# Patient Record
Sex: FEMALE | Race: WHITE | Employment: FULL TIME | URBAN - METROPOLITAN AREA
[De-identification: names, ages, dates, MRNs, and addresses within clinical notes are randomized per-mention and may not be internally consistent; named-entity substitution may affect disease eponyms.]

---

## 2017-05-12 ENCOUNTER — EMPLOYEE WELLNESS (OUTPATIENT)
Dept: OTHER | Age: 62
End: 2017-05-12

## 2017-05-12 LAB
CHOLESTEROL, TOTAL: 238 MG/DL (ref 0–199)
GLUCOSE BLD-MCNC: 92 MG/DL (ref 74–107)
HDLC SERPL-MCNC: 59 MG/DL
LDL CHOLESTEROL CALCULATED: 157 MG/DL (ref 0–99)
TRIGL SERPL-MCNC: 110 MG/DL (ref 0–149)

## 2018-02-04 ENCOUNTER — NURSE TRIAGE (OUTPATIENT)
Dept: OTHER | Facility: CLINIC | Age: 63
End: 2018-02-04

## 2018-03-20 VITALS — WEIGHT: 261 LBS | BODY MASS INDEX: 42.13 KG/M2

## 2018-06-15 ENCOUNTER — HOSPITAL ENCOUNTER (OUTPATIENT)
Dept: ULTRASOUND IMAGING | Age: 63
Discharge: HOME | End: 2018-06-15
Payer: COMMERCIAL

## 2018-06-15 ENCOUNTER — HOSPITAL ENCOUNTER (OUTPATIENT)
Dept: MAMMOGRAPHY | Age: 63
Discharge: HOME | End: 2018-06-17
Payer: COMMERCIAL

## 2018-06-15 DIAGNOSIS — N63.0 LUMP OR MASS IN BREAST: ICD-10-CM

## 2018-06-15 DIAGNOSIS — N63.0 BREAST MASS: ICD-10-CM

## 2018-06-15 PROCEDURE — 76642 ULTRASOUND BREAST LIMITED: CPT

## 2018-06-15 PROCEDURE — 77065 DX MAMMO INCL CAD UNI: CPT

## 2018-06-25 ENCOUNTER — HOSPITAL ENCOUNTER (OUTPATIENT)
Dept: ULTRASOUND IMAGING | Age: 63
Discharge: HOME | End: 2018-06-25
Payer: COMMERCIAL

## 2018-06-25 ENCOUNTER — HOSPITAL ENCOUNTER (OUTPATIENT)
Dept: MAMMOGRAPHY | Age: 63
End: 2018-06-25
Payer: COMMERCIAL

## 2018-06-25 DIAGNOSIS — N60.09 CYST OF BREAST, UNSPECIFIED LATERALITY: ICD-10-CM

## 2018-06-25 PROCEDURE — 88173 CYTOPATH EVAL FNA REPORT: CPT

## 2018-06-25 PROCEDURE — 88305 TISSUE EXAM BY PATHOLOGIST: CPT

## 2018-06-25 PROCEDURE — 76942 ECHO GUIDE FOR BIOPSY: CPT

## 2018-06-25 PROCEDURE — 19000 PUNCTURE ASPIR CYST BREAST: CPT

## 2018-07-12 ENCOUNTER — INITIAL CONSULT (OUTPATIENT)
Dept: SURGERY | Age: 63
End: 2018-07-12
Payer: COMMERCIAL

## 2018-07-12 VITALS
DIASTOLIC BLOOD PRESSURE: 84 MMHG | SYSTOLIC BLOOD PRESSURE: 128 MMHG | HEIGHT: 66 IN | HEART RATE: 84 BPM | BODY MASS INDEX: 45.8 KG/M2 | OXYGEN SATURATION: 96 % | WEIGHT: 285 LBS

## 2018-07-12 DIAGNOSIS — N63.0 BREAST MASS: Primary | ICD-10-CM

## 2018-07-12 PROCEDURE — 99204 OFFICE O/P NEW MOD 45 MIN: CPT | Performed by: SURGERY

## 2018-08-23 ENCOUNTER — HOSPITAL ENCOUNTER (OUTPATIENT)
Age: 63
Discharge: HOME | End: 2018-08-23
Payer: COMMERCIAL

## 2018-08-23 LAB
ALBUMIN SERPL-MCNC: 4.1 G/DL (ref 3.5–5.2)
ALP BLD-CCNC: 42 U/L (ref 35–104)
ALT SERPL-CCNC: 25 U/L (ref 0–32)
ANION GAP SERPL CALCULATED.3IONS-SCNC: 11 MMOL/L (ref 7–16)
AST SERPL-CCNC: 23 U/L (ref 0–31)
BILIRUB SERPL-MCNC: 0.6 MG/DL (ref 0–1.2)
BUN BLDV-MCNC: 19 MG/DL (ref 8–23)
CALCIUM SERPL-MCNC: 10.1 MG/DL (ref 8.6–10.2)
CHLORIDE BLD-SCNC: 102 MMOL/L (ref 98–107)
CHOLESTEROL, FASTING: 209 MG/DL (ref 0–199)
CO2: 28 MMOL/L (ref 22–29)
CREAT SERPL-MCNC: 1 MG/DL (ref 0.5–1)
GFR AFRICAN AMERICAN: >60
GFR NON-AFRICAN AMERICAN: 56 ML/MIN/1.73
GLUCOSE FASTING: 126 MG/DL (ref 74–109)
HCT VFR BLD CALC: 42.6 % (ref 34–48)
HDLC SERPL-MCNC: 45 MG/DL
HEMOGLOBIN: 14.5 G/DL (ref 11.5–15.5)
LDL CHOLESTEROL CALCULATED: 134 MG/DL (ref 0–99)
MAGNESIUM: 1.9 MG/DL (ref 1.6–2.6)
MCH RBC QN AUTO: 28.7 PG (ref 26–35)
MCHC RBC AUTO-ENTMCNC: 34 % (ref 32–34.5)
MCV RBC AUTO: 84.4 FL (ref 80–99.9)
PDW BLD-RTO: 12 FL (ref 11.5–15)
PLATELET # BLD: 198 E9/L (ref 130–450)
PMV BLD AUTO: 9.4 FL (ref 7–12)
POTASSIUM SERPL-SCNC: 4.4 MMOL/L (ref 3.5–5)
RBC # BLD: 5.05 E12/L (ref 3.5–5.5)
RHEUMATOID FACTOR: <10 IU/ML (ref 0–13)
SEDIMENTATION RATE, ERYTHROCYTE: 5 MM/HR (ref 0–20)
SODIUM BLD-SCNC: 141 MMOL/L (ref 132–146)
TOTAL PROTEIN: 6.6 G/DL (ref 6.4–8.3)
TRIGLYCERIDE, FASTING: 151 MG/DL (ref 0–149)
TSH SERPL DL<=0.05 MIU/L-ACNC: 1.35 UIU/ML (ref 0.27–4.2)
URIC ACID, SERUM: 7.5 MG/DL (ref 2.4–5.7)
VLDLC SERPL CALC-MCNC: 30 MG/DL
WBC # BLD: 4.5 E9/L (ref 4.5–11.5)

## 2018-08-23 PROCEDURE — 80061 LIPID PANEL: CPT

## 2018-08-23 PROCEDURE — 84443 ASSAY THYROID STIM HORMONE: CPT

## 2018-08-23 PROCEDURE — 80053 COMPREHEN METABOLIC PANEL: CPT

## 2018-08-23 PROCEDURE — 86431 RHEUMATOID FACTOR QUANT: CPT

## 2018-08-23 PROCEDURE — 85651 RBC SED RATE NONAUTOMATED: CPT

## 2018-08-23 PROCEDURE — 36415 COLL VENOUS BLD VENIPUNCTURE: CPT

## 2018-08-23 PROCEDURE — 85027 COMPLETE CBC AUTOMATED: CPT

## 2018-08-23 PROCEDURE — 84550 ASSAY OF BLOOD/URIC ACID: CPT

## 2018-08-23 PROCEDURE — 83735 ASSAY OF MAGNESIUM: CPT

## 2018-11-06 ENCOUNTER — TELEPHONE (OUTPATIENT)
Dept: SURGERY | Age: 63
End: 2018-11-06

## 2018-11-06 DIAGNOSIS — N63.0 BREAST MASS: Primary | ICD-10-CM

## 2018-11-06 NOTE — TELEPHONE ENCOUNTER
----- Message from Aydin Christianson sent at 2018  1:01 PM EDT -----  Regardin.[email or web address] US breast  Pt needs scheduled for repeat US left breast

## 2018-11-14 ENCOUNTER — HOSPITAL ENCOUNTER (OUTPATIENT)
Dept: ULTRASOUND IMAGING | Age: 63
Discharge: HOME | End: 2018-11-14
Payer: COMMERCIAL

## 2018-11-14 DIAGNOSIS — N63.0 BREAST MASS: ICD-10-CM

## 2018-11-14 PROCEDURE — 76642 ULTRASOUND BREAST LIMITED: CPT

## 2019-01-11 ENCOUNTER — HOSPITAL ENCOUNTER (OUTPATIENT)
Dept: MAMMOGRAPHY | Age: 64
Discharge: HOME | End: 2019-01-13
Payer: COMMERCIAL

## 2019-01-11 ENCOUNTER — HOSPITAL ENCOUNTER (OUTPATIENT)
Age: 64
Discharge: HOME | End: 2019-01-11
Payer: COMMERCIAL

## 2019-01-11 DIAGNOSIS — Z12.39 BREAST CANCER SCREENING: ICD-10-CM

## 2019-01-11 LAB
ALBUMIN SERPL-MCNC: 4.1 G/DL (ref 3.5–5.2)
ALP BLD-CCNC: 63 U/L (ref 35–104)
ALT SERPL-CCNC: 25 U/L (ref 0–32)
ANION GAP SERPL CALCULATED.3IONS-SCNC: 10 MMOL/L (ref 7–16)
AST SERPL-CCNC: 21 U/L (ref 0–31)
BILIRUB SERPL-MCNC: 0.3 MG/DL (ref 0–1.2)
BUN BLDV-MCNC: 18 MG/DL (ref 8–23)
CALCIUM SERPL-MCNC: 9.6 MG/DL (ref 8.6–10.2)
CHLORIDE BLD-SCNC: 101 MMOL/L (ref 98–107)
CHOLESTEROL, FASTING: 142 MG/DL (ref 0–199)
CO2: 28 MMOL/L (ref 22–29)
CREAT SERPL-MCNC: 0.8 MG/DL (ref 0.5–1)
GFR AFRICAN AMERICAN: >60
GFR NON-AFRICAN AMERICAN: >60 ML/MIN/1.73
GLUCOSE FASTING: 124 MG/DL (ref 74–99)
HDLC SERPL-MCNC: 53 MG/DL
LDL CHOLESTEROL CALCULATED: 72 MG/DL (ref 0–99)
POTASSIUM SERPL-SCNC: 4.6 MMOL/L (ref 3.5–5)
SODIUM BLD-SCNC: 139 MMOL/L (ref 132–146)
TOTAL PROTEIN: 6.9 G/DL (ref 6.4–8.3)
TRIGLYCERIDE, FASTING: 83 MG/DL (ref 0–149)
URIC ACID, SERUM: 2.8 MG/DL (ref 2.4–5.7)
VLDLC SERPL CALC-MCNC: 17 MG/DL

## 2019-01-11 PROCEDURE — 36415 COLL VENOUS BLD VENIPUNCTURE: CPT

## 2019-01-11 PROCEDURE — 77063 BREAST TOMOSYNTHESIS BI: CPT

## 2019-01-11 PROCEDURE — 80053 COMPREHEN METABOLIC PANEL: CPT

## 2019-01-11 PROCEDURE — 80061 LIPID PANEL: CPT

## 2019-01-11 PROCEDURE — 84550 ASSAY OF BLOOD/URIC ACID: CPT

## 2019-03-12 ENCOUNTER — HOSPITAL ENCOUNTER (OUTPATIENT)
Dept: MAMMOGRAPHY | Age: 64
Discharge: HOME | End: 2019-03-14
Payer: COMMERCIAL

## 2019-03-12 DIAGNOSIS — Z13.820 SCREENING FOR OSTEOPOROSIS: ICD-10-CM

## 2019-03-12 PROCEDURE — 77080 DXA BONE DENSITY AXIAL: CPT

## 2019-06-28 ENCOUNTER — HOSPITAL ENCOUNTER (OUTPATIENT)
Age: 64
Discharge: HOME | End: 2019-06-28
Payer: COMMERCIAL

## 2019-06-28 LAB
ALBUMIN SERPL-MCNC: 4.1 G/DL (ref 3.5–5.2)
ALP BLD-CCNC: 64 U/L (ref 35–104)
ALT SERPL-CCNC: 22 U/L (ref 0–32)
ANION GAP SERPL CALCULATED.3IONS-SCNC: 11 MMOL/L (ref 7–16)
AST SERPL-CCNC: 19 U/L (ref 0–31)
BASOPHILS ABSOLUTE: 0.05 E9/L (ref 0–0.2)
BASOPHILS RELATIVE PERCENT: 0.9 % (ref 0–2)
BILIRUB SERPL-MCNC: 0.6 MG/DL (ref 0–1.2)
BUN BLDV-MCNC: 21 MG/DL (ref 8–23)
CALCIUM SERPL-MCNC: 10 MG/DL (ref 8.6–10.2)
CHLORIDE BLD-SCNC: 101 MMOL/L (ref 98–107)
CO2: 28 MMOL/L (ref 22–29)
CREAT SERPL-MCNC: 0.9 MG/DL (ref 0.5–1)
EOSINOPHILS ABSOLUTE: 0.17 E9/L (ref 0.05–0.5)
EOSINOPHILS RELATIVE PERCENT: 3.2 % (ref 0–6)
GFR AFRICAN AMERICAN: >60
GFR NON-AFRICAN AMERICAN: >60 ML/MIN/1.73
GLUCOSE FASTING: 135 MG/DL (ref 74–99)
HCT VFR BLD CALC: 42 % (ref 34–48)
HEMOGLOBIN: 13.8 G/DL (ref 11.5–15.5)
IMMATURE GRANULOCYTES #: 0.02 E9/L
IMMATURE GRANULOCYTES %: 0.4 % (ref 0–5)
LYMPHOCYTES ABSOLUTE: 1.14 E9/L (ref 1.5–4)
LYMPHOCYTES RELATIVE PERCENT: 21.5 % (ref 20–42)
MCH RBC QN AUTO: 28.2 PG (ref 26–35)
MCHC RBC AUTO-ENTMCNC: 32.9 % (ref 32–34.5)
MCV RBC AUTO: 85.9 FL (ref 80–99.9)
MONOCYTES ABSOLUTE: 0.51 E9/L (ref 0.1–0.95)
MONOCYTES RELATIVE PERCENT: 9.6 % (ref 2–12)
NEUTROPHILS ABSOLUTE: 3.42 E9/L (ref 1.8–7.3)
NEUTROPHILS RELATIVE PERCENT: 64.4 % (ref 43–80)
PDW BLD-RTO: 12.1 FL (ref 11.5–15)
PLATELET # BLD: 206 E9/L (ref 130–450)
PMV BLD AUTO: 9.5 FL (ref 7–12)
POTASSIUM SERPL-SCNC: 4.4 MMOL/L (ref 3.5–5)
RBC # BLD: 4.89 E12/L (ref 3.5–5.5)
SODIUM BLD-SCNC: 140 MMOL/L (ref 132–146)
TOTAL PROTEIN: 6.8 G/DL (ref 6.4–8.3)
TSH SERPL DL<=0.05 MIU/L-ACNC: 0.91 UIU/ML (ref 0.27–4.2)
URIC ACID, SERUM: 3.1 MG/DL (ref 2.4–5.7)
WBC # BLD: 5.3 E9/L (ref 4.5–11.5)

## 2019-06-28 PROCEDURE — 85025 COMPLETE CBC W/AUTO DIFF WBC: CPT

## 2019-06-28 PROCEDURE — 80053 COMPREHEN METABOLIC PANEL: CPT

## 2019-06-28 PROCEDURE — 84550 ASSAY OF BLOOD/URIC ACID: CPT

## 2019-06-28 PROCEDURE — 36415 COLL VENOUS BLD VENIPUNCTURE: CPT

## 2019-06-28 PROCEDURE — 84443 ASSAY THYROID STIM HORMONE: CPT

## 2019-11-18 ENCOUNTER — HOSPITAL ENCOUNTER (OUTPATIENT)
Age: 64
Discharge: HOME | End: 2019-11-18
Payer: COMMERCIAL

## 2019-11-18 LAB
CHOLESTEROL, FASTING: 151 MG/DL (ref 0–199)
HDLC SERPL-MCNC: 51 MG/DL
LDL CHOLESTEROL CALCULATED: 70 MG/DL (ref 0–99)
TRIGLYCERIDE, FASTING: 149 MG/DL (ref 0–149)
VLDLC SERPL CALC-MCNC: 30 MG/DL

## 2019-11-18 PROCEDURE — 83721 ASSAY OF BLOOD LIPOPROTEIN: CPT

## 2019-11-18 PROCEDURE — 36415 COLL VENOUS BLD VENIPUNCTURE: CPT

## 2019-11-18 PROCEDURE — 80061 LIPID PANEL: CPT

## 2019-11-20 LAB — MISCELLANEOUS LAB TEST RESULT: NORMAL

## 2020-03-03 ENCOUNTER — HOSPITAL ENCOUNTER (INPATIENT)
Age: 65
LOS: 2 days | Discharge: HOME | DRG: 313 | End: 2020-03-05
Attending: INTERNAL MEDICINE | Admitting: INTERNAL MEDICINE
Payer: COMMERCIAL

## 2020-03-03 ENCOUNTER — APPOINTMENT (OUTPATIENT)
Dept: GENERAL RADIOLOGY | Age: 65
DRG: 313 | End: 2020-03-03
Attending: INTERNAL MEDICINE
Payer: COMMERCIAL

## 2020-03-03 PROBLEM — R06.09 DOE (DYSPNEA ON EXERTION): Status: ACTIVE | Noted: 2020-03-03

## 2020-03-03 LAB
ANION GAP SERPL CALCULATED.3IONS-SCNC: 14 MMOL/L (ref 7–16)
BUN BLDV-MCNC: 18 MG/DL (ref 8–23)
CALCIUM SERPL-MCNC: 9.8 MG/DL (ref 8.6–10.2)
CHLORIDE BLD-SCNC: 101 MMOL/L (ref 98–107)
CO2: 24 MMOL/L (ref 22–29)
CREAT SERPL-MCNC: 0.9 MG/DL (ref 0.5–1)
D DIMER: 538 NG/ML DDU
GFR AFRICAN AMERICAN: >60
GFR NON-AFRICAN AMERICAN: >60 ML/MIN/1.73
GLUCOSE BLD-MCNC: 141 MG/DL (ref 74–99)
HCT VFR BLD CALC: 43 % (ref 34–48)
HEMOGLOBIN: 14.4 G/DL (ref 11.5–15.5)
MCH RBC QN AUTO: 28.7 PG (ref 26–35)
MCHC RBC AUTO-ENTMCNC: 33.5 % (ref 32–34.5)
MCV RBC AUTO: 85.7 FL (ref 80–99.9)
PDW BLD-RTO: 12.2 FL (ref 11.5–15)
PLATELET # BLD: 210 E9/L (ref 130–450)
PMV BLD AUTO: 9.7 FL (ref 7–12)
POTASSIUM SERPL-SCNC: 3.8 MMOL/L (ref 3.5–5)
RBC # BLD: 5.02 E12/L (ref 3.5–5.5)
SODIUM BLD-SCNC: 139 MMOL/L (ref 132–146)
TROPONIN: <0.01 NG/ML (ref 0–0.03)
WBC # BLD: 6.1 E9/L (ref 4.5–11.5)

## 2020-03-03 PROCEDURE — 84484 ASSAY OF TROPONIN QUANT: CPT

## 2020-03-03 PROCEDURE — 1200000000 HC SEMI PRIVATE

## 2020-03-03 PROCEDURE — 80048 BASIC METABOLIC PNL TOTAL CA: CPT

## 2020-03-03 PROCEDURE — 36415 COLL VENOUS BLD VENIPUNCTURE: CPT

## 2020-03-03 PROCEDURE — G0379 DIRECT REFER HOSPITAL OBSERV: HCPCS

## 2020-03-03 PROCEDURE — 6370000000 HC RX 637 (ALT 250 FOR IP): Performed by: INTERNAL MEDICINE

## 2020-03-03 PROCEDURE — 6360000002 HC RX W HCPCS: Performed by: INTERNAL MEDICINE

## 2020-03-03 PROCEDURE — 96372 THER/PROPH/DIAG INJ SC/IM: CPT

## 2020-03-03 PROCEDURE — G0378 HOSPITAL OBSERVATION PER HR: HCPCS

## 2020-03-03 PROCEDURE — 71046 X-RAY EXAM CHEST 2 VIEWS: CPT

## 2020-03-03 PROCEDURE — 85378 FIBRIN DEGRADE SEMIQUANT: CPT

## 2020-03-03 PROCEDURE — 85027 COMPLETE CBC AUTOMATED: CPT

## 2020-03-03 RX ORDER — ONDANSETRON 2 MG/ML
4 INJECTION INTRAMUSCULAR; INTRAVENOUS EVERY 6 HOURS PRN
Status: DISCONTINUED | OUTPATIENT
Start: 2020-03-03 | End: 2020-03-05 | Stop reason: HOSPADM

## 2020-03-03 RX ORDER — M-VIT,TX,IRON,MINS/CALC/FOLIC 27MG-0.4MG
1 TABLET ORAL DAILY
Status: DISCONTINUED | OUTPATIENT
Start: 2020-03-03 | End: 2020-03-05 | Stop reason: HOSPADM

## 2020-03-03 RX ORDER — NAPROXEN 250 MG/1
250 TABLET ORAL 2 TIMES DAILY PRN
Status: DISCONTINUED | OUTPATIENT
Start: 2020-03-03 | End: 2020-03-05 | Stop reason: HOSPADM

## 2020-03-03 RX ORDER — LISINOPRIL 20 MG/1
20 TABLET ORAL DAILY
Status: ON HOLD | COMMUNITY
End: 2020-03-05 | Stop reason: SDUPTHER

## 2020-03-03 RX ORDER — NAPROXEN SODIUM 220 MG
220 TABLET ORAL 2 TIMES DAILY PRN
COMMUNITY

## 2020-03-03 RX ORDER — OMEPRAZOLE 20 MG/1
20 CAPSULE, DELAYED RELEASE ORAL DAILY
COMMUNITY
End: 2022-04-08

## 2020-03-03 RX ORDER — PANTOPRAZOLE SODIUM 40 MG/1
40 TABLET, DELAYED RELEASE ORAL
Status: DISCONTINUED | OUTPATIENT
Start: 2020-03-04 | End: 2020-03-05 | Stop reason: HOSPADM

## 2020-03-03 RX ORDER — IPRATROPIUM BROMIDE AND ALBUTEROL SULFATE 2.5; .5 MG/3ML; MG/3ML
1 SOLUTION RESPIRATORY (INHALATION) EVERY 4 HOURS PRN
Status: DISCONTINUED | OUTPATIENT
Start: 2020-03-03 | End: 2020-03-05 | Stop reason: HOSPADM

## 2020-03-03 RX ORDER — ATORVASTATIN CALCIUM 20 MG/1
20 TABLET, FILM COATED ORAL NIGHTLY
Status: DISCONTINUED | OUTPATIENT
Start: 2020-03-03 | End: 2020-03-05 | Stop reason: HOSPADM

## 2020-03-03 RX ORDER — VITAMIN B COMPLEX
4000 TABLET ORAL DAILY
Status: DISCONTINUED | OUTPATIENT
Start: 2020-03-03 | End: 2020-03-05 | Stop reason: HOSPADM

## 2020-03-03 RX ORDER — ACETAMINOPHEN 500 MG
500 TABLET ORAL EVERY 6 HOURS PRN
Status: DISCONTINUED | OUTPATIENT
Start: 2020-03-03 | End: 2020-03-05 | Stop reason: HOSPADM

## 2020-03-03 RX ORDER — LISINOPRIL 20 MG/1
20 TABLET ORAL DAILY
Status: DISCONTINUED | OUTPATIENT
Start: 2020-03-03 | End: 2020-03-04

## 2020-03-03 RX ORDER — NITROGLYCERIN 0.4 MG/1
0.4 TABLET SUBLINGUAL EVERY 5 MIN PRN
Status: DISCONTINUED | OUTPATIENT
Start: 2020-03-03 | End: 2020-03-05 | Stop reason: HOSPADM

## 2020-03-03 RX ORDER — ATORVASTATIN CALCIUM 20 MG/1
20 TABLET, FILM COATED ORAL NIGHTLY
COMMUNITY

## 2020-03-03 RX ORDER — FEBUXOSTAT 40 MG/1
40 TABLET, FILM COATED ORAL DAILY
Status: ON HOLD | COMMUNITY
End: 2020-03-05 | Stop reason: HOSPADM

## 2020-03-03 RX ORDER — NAPROXEN SODIUM 220 MG
220 TABLET ORAL 2 TIMES DAILY PRN
Status: DISCONTINUED | OUTPATIENT
Start: 2020-03-03 | End: 2020-03-03 | Stop reason: CLARIF

## 2020-03-03 RX ORDER — FEBUXOSTAT 40 MG/1
40 TABLET, FILM COATED ORAL DAILY
Status: DISCONTINUED | OUTPATIENT
Start: 2020-03-03 | End: 2020-03-03

## 2020-03-03 RX ADMIN — ENOXAPARIN SODIUM 40 MG: 40 INJECTION SUBCUTANEOUS at 17:51

## 2020-03-03 RX ADMIN — VITAMIN D, TAB 1000IU (100/BT) 4000 UNITS: 25 TAB at 17:50

## 2020-03-03 RX ADMIN — ATORVASTATIN CALCIUM 20 MG: 20 TABLET, FILM COATED ORAL at 20:31

## 2020-03-03 RX ADMIN — LISINOPRIL 20 MG: 20 TABLET ORAL at 17:50

## 2020-03-03 RX ADMIN — MULTIPLE VITAMINS W/ MINERALS TAB 1 TABLET: TAB at 17:50

## 2020-03-03 RX ADMIN — ASPIRIN 325 MG: 325 TABLET, DELAYED RELEASE ORAL at 20:31

## 2020-03-03 ASSESSMENT — PAIN SCALES - GENERAL
PAINLEVEL_OUTOF10: 0
PAINLEVEL_OUTOF10: 0

## 2020-03-03 NOTE — H&P
Department of Internal Medicine        CHIEF COMPLAINT: Shortness of breath, chest heaviness    Reason for Admission: Same    HISTORY OF PRESENT ILLNESS:      The patient is a 60 y.o. female who presents with shortness of breath with activity and chest heaviness with activity over the last 2 weeks. This was a continuous problem for the past 2 weeks with the patient stating that is similar to when she had a PE years ago. The PE is associated with surgery. Patient also seems to think it may be from her medication Uloric. Patient did end up stopping about a week ago with her symptoms mildly improved but they are still present. She denies any fever and chills, palpitations, dizziness or syncope. Patient has not had a stress test for many years. She denies any head congestion or cough. There is no significant family history of coronary disease or MI.     Past Medical History:    Past Medical History:   Diagnosis Date    Anticoagulant long-term use 03/2016    Xarelto    Hemorrhoids     Hiatal hernia     Hx of blood clots 03/2016    pulmonary embolism    Hyperlipidemia     Hypertension     Obesity     morbid    Osteoarthritis     Postoperative surgical complication involving digestive system     Wears dentures     permanent upper bridge front right tooth, next 2 on right     Past Surgical History:    Past Surgical History:   Procedure Laterality Date    APPENDECTOMY      CARPAL TUNNEL RELEASE      left    COLONOSCOPY  10/26/2016    Dr. Faiza Aranda, Findings: Normal Colon to the cecum - repeat in 10 years    CYST REMOVAL      EYE SURGERY Left 2016    retina tear    GASTRIC BAND  2/27/2007    Lap; 10 cm band; with hiatal hernia repair    JOINT REPLACEMENT Left 1/29/2016    total hip    JOINT REPLACEMENT Bilateral     knees    OTHER SURGICAL HISTORY  12/05/2016    laparscopic gastric band removal and hiatal hernia repair    OVARY REMOVAL Right     right due to cyst    TOTAL KNEE ARTHROPLASTY Bilateral General:  This is a 60 y.o. yo female who is alert and oriented in NAD  HEENT:  Head is normocephalic and atraumatic, PERRLA, EOMI, mucus membranes moist with no pharyngeal erythema or exudate. Neck:  Supple with no carotid bruits, JVD or thyromegaly. No cervical adenopathy  CV:  Regular rate and rhythm, no murmurs  Lungs:  Clear to auscultation bilaterally with no wheezes, rales or rhonchi  Abdomen:  + Morbidly obese.   Soft, nontender, nondistended, bowel sounds present  Extremities:  No edema, peripheral pulses intact bilaterally  Neuro:  Cranial nerves II-XII grossly intact; motor and sensory function intact with no focal deficits  Skin:  No rashes, lesions or wounds    DATA:  CBC with Differential:    Lab Results   Component Value Date    WBC 5.3 06/28/2019    RBC 4.89 06/28/2019    HGB 13.8 06/28/2019    HCT 42.0 06/28/2019     06/28/2019    MCV 85.9 06/28/2019    MCH 28.2 06/28/2019    MCHC 32.9 06/28/2019    RDW 12.1 06/28/2019    LYMPHOPCT 21.5 06/28/2019    MONOPCT 9.6 06/28/2019    BASOPCT 0.9 06/28/2019    MONOSABS 0.51 06/28/2019    LYMPHSABS 1.14 06/28/2019    EOSABS 0.17 06/28/2019    BASOSABS 0.05 06/28/2019     CMP:    Lab Results   Component Value Date     06/28/2019    K 4.4 06/28/2019     06/28/2019    CO2 28 06/28/2019    BUN 21 06/28/2019    CREATININE 0.9 06/28/2019    GFRAA >60 06/28/2019    LABGLOM >60 06/28/2019    GLUCOSE 92 05/12/2017    PROT 6.8 06/28/2019    LABALBU 4.1 06/28/2019    CALCIUM 10.0 06/28/2019    BILITOT 0.6 06/28/2019    ALKPHOS 64 06/28/2019    AST 19 06/28/2019    ALT 22 06/28/2019     Magnesium:    Lab Results   Component Value Date    MG 1.9 08/23/2018     Phosphorus:  No results found for: PHOS  PT/INR:    Lab Results   Component Value Date    PROTIME 12.7 03/09/2016    INR 1.2 03/09/2016     Troponin:    Lab Results   Component Value Date    TROPONINI <0.01 03/09/2016     U/A:  No results found for: NITRITE, COLORU, PROTEINU, PHUR, LABCAST,

## 2020-03-03 NOTE — LETTER
[facility] MED SURG TELE  [address]   Phone: [phone]    No name on file. March 5, 2020     Patient: Peyton Grier   YOB: 1955   Date of Visit: 3/3/2020       To Whom It May Concern:    [facility] was under our care from 3/3/2020 to 3/5/2020    If you have any questions or concerns, please don't hesitate to call.     Sincerely,    KIRTI BrittN,RN-BC

## 2020-03-04 ENCOUNTER — APPOINTMENT (OUTPATIENT)
Dept: CT IMAGING | Age: 65
DRG: 313 | End: 2020-03-04
Attending: INTERNAL MEDICINE
Payer: COMMERCIAL

## 2020-03-04 ENCOUNTER — APPOINTMENT (OUTPATIENT)
Dept: NUCLEAR MEDICINE | Age: 65
DRG: 313 | End: 2020-03-04
Attending: INTERNAL MEDICINE
Payer: COMMERCIAL

## 2020-03-04 LAB
ADENOVIRUS BY PCR: NOT DETECTED
ALBUMIN SERPL-MCNC: 3.7 G/DL (ref 3.5–5.2)
ALP BLD-CCNC: 53 U/L (ref 35–104)
ALT SERPL-CCNC: 23 U/L (ref 0–32)
ANION GAP SERPL CALCULATED.3IONS-SCNC: 13 MMOL/L (ref 7–16)
AST SERPL-CCNC: 22 U/L (ref 0–31)
BILIRUB SERPL-MCNC: 0.7 MG/DL (ref 0–1.2)
BORDETELLA PARAPERTUSSIS BY PCR: NOT DETECTED
BORDETELLA PERTUSSIS BY PCR: NOT DETECTED
BUN BLDV-MCNC: 20 MG/DL (ref 8–23)
CALCIUM SERPL-MCNC: 9.2 MG/DL (ref 8.6–10.2)
CHLAMYDOPHILIA PNEUMONIAE BY PCR: NOT DETECTED
CHLORIDE BLD-SCNC: 104 MMOL/L (ref 98–107)
CHOLESTEROL, TOTAL: 139 MG/DL (ref 0–199)
CO2: 23 MMOL/L (ref 22–29)
CORONAVIRUS 229E BY PCR: NOT DETECTED
CORONAVIRUS HKU1 BY PCR: NOT DETECTED
CORONAVIRUS NL63 BY PCR: NOT DETECTED
CORONAVIRUS OC43 BY PCR: NOT DETECTED
CREAT SERPL-MCNC: 0.8 MG/DL (ref 0.5–1)
GFR AFRICAN AMERICAN: >60
GFR NON-AFRICAN AMERICAN: >60 ML/MIN/1.73
GLUCOSE BLD-MCNC: 125 MG/DL (ref 74–99)
HDLC SERPL-MCNC: 49 MG/DL
HUMAN METAPNEUMOVIRUS BY PCR: NOT DETECTED
HUMAN RHINOVIRUS/ENTEROVIRUS BY PCR: NOT DETECTED
INFLUENZA A BY PCR: NOT DETECTED
INFLUENZA B BY PCR: NOT DETECTED
LDL CHOLESTEROL CALCULATED: 72 MG/DL (ref 0–99)
LV EF: 68 %
LV EF: 75 %
LVEF MODALITY: NORMAL
LVEF MODALITY: NORMAL
MYCOPLASMA PNEUMONIAE BY PCR: NOT DETECTED
PARAINFLUENZA VIRUS 1 BY PCR: NOT DETECTED
PARAINFLUENZA VIRUS 2 BY PCR: NOT DETECTED
PARAINFLUENZA VIRUS 3 BY PCR: NOT DETECTED
PARAINFLUENZA VIRUS 4 BY PCR: NOT DETECTED
POTASSIUM SERPL-SCNC: 4.1 MMOL/L (ref 3.5–5)
RESPIRATORY SYNCYTIAL VIRUS BY PCR: NOT DETECTED
SODIUM BLD-SCNC: 140 MMOL/L (ref 132–146)
TOTAL PROTEIN: 6.2 G/DL (ref 6.4–8.3)
TRIGL SERPL-MCNC: 92 MG/DL (ref 0–149)
TROPONIN: <0.01 NG/ML (ref 0–0.03)
TROPONIN: <0.01 NG/ML (ref 0–0.03)
TSH SERPL DL<=0.05 MIU/L-ACNC: 1.39 UIU/ML (ref 0.27–4.2)
VLDLC SERPL CALC-MCNC: 18 MG/DL

## 2020-03-04 PROCEDURE — 71275 CT ANGIOGRAPHY CHEST: CPT

## 2020-03-04 PROCEDURE — 80053 COMPREHEN METABOLIC PANEL: CPT

## 2020-03-04 PROCEDURE — 0100U HC RESPIRPTHGN MULT REV TRANS & AMP PRB TECH 21 TRGT: CPT

## 2020-03-04 PROCEDURE — 96372 THER/PROPH/DIAG INJ SC/IM: CPT

## 2020-03-04 PROCEDURE — A9500 TC99M SESTAMIBI: HCPCS | Performed by: RADIOLOGY

## 2020-03-04 PROCEDURE — 36415 COLL VENOUS BLD VENIPUNCTURE: CPT

## 2020-03-04 PROCEDURE — 6370000000 HC RX 637 (ALT 250 FOR IP): Performed by: INTERNAL MEDICINE

## 2020-03-04 PROCEDURE — 84484 ASSAY OF TROPONIN QUANT: CPT

## 2020-03-04 PROCEDURE — 3430000000 HC RX DIAGNOSTIC RADIOPHARMACEUTICAL: Performed by: RADIOLOGY

## 2020-03-04 PROCEDURE — 99254 IP/OBS CNSLTJ NEW/EST MOD 60: CPT | Performed by: INTERNAL MEDICINE

## 2020-03-04 PROCEDURE — 93017 CV STRESS TEST TRACING ONLY: CPT

## 2020-03-04 PROCEDURE — 6360000002 HC RX W HCPCS: Performed by: INTERNAL MEDICINE

## 2020-03-04 PROCEDURE — 93306 TTE W/DOPPLER COMPLETE: CPT

## 2020-03-04 PROCEDURE — APPSS60 APP SPLIT SHARED TIME 46-60 MINUTES: Performed by: PHYSICIAN ASSISTANT

## 2020-03-04 PROCEDURE — 84443 ASSAY THYROID STIM HORMONE: CPT

## 2020-03-04 PROCEDURE — 1200000000 HC SEMI PRIVATE

## 2020-03-04 PROCEDURE — 93005 ELECTROCARDIOGRAM TRACING: CPT | Performed by: PHYSICIAN ASSISTANT

## 2020-03-04 PROCEDURE — G0378 HOSPITAL OBSERVATION PER HR: HCPCS

## 2020-03-04 PROCEDURE — 78452 HT MUSCLE IMAGE SPECT MULT: CPT

## 2020-03-04 PROCEDURE — 6360000004 HC RX CONTRAST MEDICATION: Performed by: RADIOLOGY

## 2020-03-04 PROCEDURE — 80061 LIPID PANEL: CPT

## 2020-03-04 RX ORDER — LISINOPRIL 10 MG/1
10 TABLET ORAL DAILY
Status: DISCONTINUED | OUTPATIENT
Start: 2020-03-05 | End: 2020-03-05 | Stop reason: HOSPADM

## 2020-03-04 RX ADMIN — Medication 10 MILLICURIE: at 07:39

## 2020-03-04 RX ADMIN — VITAMIN D, TAB 1000IU (100/BT) 4000 UNITS: 25 TAB at 12:34

## 2020-03-04 RX ADMIN — REGADENOSON 0.4 MG: 0.08 INJECTION, SOLUTION INTRAVENOUS at 10:10

## 2020-03-04 RX ADMIN — ASPIRIN 325 MG: 325 TABLET, DELAYED RELEASE ORAL at 12:35

## 2020-03-04 RX ADMIN — METOPROLOL TARTRATE 25 MG: 25 TABLET, FILM COATED ORAL at 21:13

## 2020-03-04 RX ADMIN — Medication 30 MILLICURIE: at 10:13

## 2020-03-04 RX ADMIN — MULTIPLE VITAMINS W/ MINERALS TAB 1 TABLET: TAB at 12:35

## 2020-03-04 RX ADMIN — ATORVASTATIN CALCIUM 20 MG: 20 TABLET, FILM COATED ORAL at 21:13

## 2020-03-04 RX ADMIN — METOPROLOL TARTRATE 25 MG: 25 TABLET, FILM COATED ORAL at 12:35

## 2020-03-04 RX ADMIN — ENOXAPARIN SODIUM 40 MG: 40 INJECTION SUBCUTANEOUS at 18:00

## 2020-03-04 RX ADMIN — IOPAMIDOL 80 ML: 755 INJECTION, SOLUTION INTRAVENOUS at 20:53

## 2020-03-04 ASSESSMENT — PAIN SCALES - GENERAL
PAINLEVEL_OUTOF10: 0

## 2020-03-04 NOTE — PROGRESS NOTES
Dr. Kadi Tovar, Findings: Normal Colon to the cecum - repeat in 10 years    CYST REMOVAL      EYE SURGERY Left 2016    retina tear    GASTRIC BAND  2/27/2007    Lap; 10 cm band; with hiatal hernia repair    JOINT REPLACEMENT Left 1/29/2016    total hip    JOINT REPLACEMENT Bilateral     knees    OTHER SURGICAL HISTORY  12/05/2016    laparscopic gastric band removal and hiatal hernia repair    OVARY REMOVAL Right     right due to cyst    TOTAL KNEE ARTHROPLASTY Bilateral 2012    UPPER GASTROINTESTINAL ENDOSCOPY  2007    UPPER GASTROINTESTINAL ENDOSCOPY  10/26/2016    Dr. Kadi Tovar, Findings: GE Reflux, 2.5cm Hiatal Hernia with the slipped stomach up in the chest       Medications Prior to Admission:    @  Prior to Admission medications    Medication Sig Start Date End Date Taking? Authorizing Provider   naproxen sodium (ALEVE) 220 MG tablet Take 220 mg by mouth 2 times daily as needed for Pain   Yes Historical Provider, MD   lisinopril (PRINIVIL;ZESTRIL) 20 MG tablet Take 20 mg by mouth daily   Yes Historical Provider, MD   atorvastatin (LIPITOR) 20 MG tablet Take 20 mg by mouth nightly   Yes Historical Provider, MD   omeprazole (PRILOSEC) 20 MG delayed release capsule Take 20 mg by mouth Daily   Yes Historical Provider, MD   Multiple Vitamins-Calcium (ONE-A-DAY WOMENS PO) Take 1 tablet by mouth daily   Yes Historical Provider, MD   Cholecalciferol 4000 UNITS CAPS Take 1 capsule by mouth daily   Yes Historical Provider, MD   febuxostat (ULORIC) 40 MG TABS tablet Take 40 mg by mouth daily    Historical Provider, MD   acetaminophen (TYLENOL) 500 MG tablet Take 500 mg by mouth every 6 hours as needed for Pain    Historical Provider, MD       Allergies:  Patient has no known allergies.     Social History:   Social History     Socioeconomic History    Marital status: Single     Spouse name: Not on file    Number of children: Not on file    Years of education: Not on file    Highest education level: Not on file   Occupational History    Not on file   Social Needs    Financial resource strain: Not on file    Food insecurity:     Worry: Not on file     Inability: Not on file    Transportation needs:     Medical: Not on file     Non-medical: Not on file   Tobacco Use    Smoking status: Never Smoker    Smokeless tobacco: Never Used   Substance and Sexual Activity    Alcohol use: No     Comment: 1 to 2 times per year    Drug use: No    Sexual activity: Never   Lifestyle    Physical activity:     Days per week: Not on file     Minutes per session: Not on file    Stress: Not on file   Relationships    Social connections:     Talks on phone: Not on file     Gets together: Not on file     Attends Confucianism service: Not on file     Active member of club or organization: Not on file     Attends meetings of clubs or organizations: Not on file     Relationship status: Not on file    Intimate partner violence:     Fear of current or ex partner: Not on file     Emotionally abused: Not on file     Physically abused: Not on file     Forced sexual activity: Not on file   Other Topics Concern    Not on file   Social History Narrative    Not on file       Family History:   Family History   Problem Relation Age of Onset    Cancer Father 39        bladder    No Known Problems Mother     No Known Problems Sister     No Known Problems Brother     No Known Problems Maternal Aunt     No Known Problems Maternal Uncle     Cancer Paternal Aunt         lung    No Known Problems Paternal Uncle     No Known Problems Maternal Grandmother     No Known Problems Maternal Grandfather     No Known Problems Paternal Grandmother     No Known Problems Paternal Grandfather     Mult Sclerosis Paternal Cousin        REVIEW OF SYSTEMS:    Gen: Patient denies any lightheadedness or dizziness. No LOC or syncope. No fevers or chills. HEENT: No earache, sore throat or nasal congestion.     Resp: Denies cough, hemoptysis or sputum production. Cardiac: + chest pain/heaviness, SOB,no diaphoresis or palpitations. GI: No nausea, vomiting, diarrhea or constipation. No melena or hematochezia. : No urinary complaints, dysuria, hematuria or frequency. MSK: No extremity weakness, paralysis or paresthesias. PHYSICAL EXAM:    Vitals:  /73   Pulse 69   Temp 98 °F (36.7 °C) (Oral)   Resp 16   Ht 5' 6\" (1.676 m)   Wt (!) 305 lb 9.6 oz (138.6 kg)   SpO2 96%   BMI 49.33 kg/m²     General:  This is a 60 y.o. yo female who is alert and oriented in NAD  HEENT:  Head is normocephalic and atraumatic, PERRLA, EOMI, mucus membranes moist with no pharyngeal erythema or exudate. Neck:  Supple with no carotid bruits, JVD or thyromegaly. No cervical adenopathy  CV:  Regular rate and rhythm, no murmurs  Lungs:  Clear to auscultation bilaterally with no wheezes, rales or rhonchi  Abdomen:  + Morbidly obese.   Soft, nontender, nondistended, bowel sounds present  Extremities:  No edema, peripheral pulses intact bilaterally  Neuro:  Cranial nerves II-XII grossly intact; motor and sensory function intact with no focal deficits  Skin:  No rashes, lesions or wounds    DATA:  CBC with Differential:    Lab Results   Component Value Date    WBC 6.1 03/03/2020    RBC 5.02 03/03/2020    HGB 14.4 03/03/2020    HCT 43.0 03/03/2020     03/03/2020    MCV 85.7 03/03/2020    MCH 28.7 03/03/2020    MCHC 33.5 03/03/2020    RDW 12.2 03/03/2020    LYMPHOPCT 21.5 06/28/2019    MONOPCT 9.6 06/28/2019    BASOPCT 0.9 06/28/2019    MONOSABS 0.51 06/28/2019    LYMPHSABS 1.14 06/28/2019    EOSABS 0.17 06/28/2019    BASOSABS 0.05 06/28/2019     CMP:    Lab Results   Component Value Date     03/04/2020    K 4.1 03/04/2020     03/04/2020    CO2 23 03/04/2020    BUN 20 03/04/2020    CREATININE 0.8 03/04/2020    GFRAA >60 03/04/2020    LABGLOM >60 03/04/2020    GLUCOSE 125 03/04/2020    PROT 6.2 03/04/2020    LABALBU 3.7 03/04/2020    CALCIUM 9.2 Zeus Russell D.O.  3/4/2020  10:09 AM

## 2020-03-04 NOTE — PROGRESS NOTES
Pt walked to the nurses station and back spo2 remained 93% on room air and . At rest spo2 was 93% on room air and HR was 88.

## 2020-03-04 NOTE — CONSULTS
multivitamin-minerals 1 tablet, 1 tablet, Oral, Daily, Kadie Fake, DO, 1 tablet at 03/03/20 1750    ipratropium-albuterol (DUONEB) nebulizer solution 1 ampule, 1 ampule, Inhalation, Q4H PRN, Kadie Fake, DO    naproxen (NAPROSYN) tablet 250 mg, 250 mg, Oral, BID PRN, Kadie Fake, DO    enoxaparin (LOVENOX) injection 40 mg, 40 mg, Subcutaneous, Daily, Kadie Fake, DO, 40 mg at 03/03/20 1751    nitroGLYCERIN (NITROSTAT) SL tablet 0.4 mg, 0.4 mg, Sublingual, Q5 Min PRN, Kadie Fake, DO    aspirin EC tablet 325 mg, 325 mg, Oral, Daily, Kadie Fake, DO, 325 mg at 03/03/20 2031    ondansetron (ZOFRAN) injection 4 mg, 4 mg, Intravenous, Q6H PRN, Kadie Fake, DO      ALLERGIES:  Patient has no known allergies. SOCIAL HISTORY:    Denies former or current alcohol, tobacco or illicit drug use. FAMILY HISTORY:   Denies pertinent cardiac family medical history at this time. REVIEW OF SYSTEMS:     · Constitutional: Denies fevers, chills, night sweats, and generalized fatigue. Denies significant weight loss or weight gain. · HEENT: Denies headaches, nose bleeds, rhinorrhea, sore throat. Denies blurred vision. Denies dysphagia, odynophagia. · Musculoskeletal: Denies falls, pain to BLE with ambulation. Denies muscle weakness. · Neurological: Denies dizziness and lightheadedness, numbness and tingling. Denies focal neurological deficits. · Cardiovascular: +chest discomfort. Denies palpitations, diaphoresis. Denies syncope. Denies PND, orthopnea, peripheral edema. · Respiratory: +dyspnea on exertion. Denies shortness of breath at rest. Denies cough, hemoptysis. · Gastrointestinal: Denies abdominal pain, nausea/vomiting, diarrhea and constipation, black/bloody, and tarry stools. · Genitourinary: Denies dysuria and hematuria. · Hematologic: Denies excessive bruising or bleeding. · Endocrine: Denies excessive thirst. Denies intolerance to hot and cold.       PHYSICAL EXAM: LULU PE in 2016 came for recurrent IZQUIERDO and CP for few weeks, No PND or orthopnea; Patient states that over the past two to three week, she has been experiencing new-onset chest discomfort and shortness of breath. She states she only notices both the chest discomfort and shortness of breath with exertion. The chest discomfort is located mid-sternally with no radiation. She describes it as an alternating heaviness, tightness sensation. She states typically the chest discomfort only lasts minutes due to the patient sitting down to rest. The chest discomfort resolves completely and quickly with rest. Currently CP free, No Orthopnea; No palpitations or dizzzy     Review of systems:  Review of 10 systems negative except as mentioned in the HPI     Medical History: Reviewed     Surgical history: Reviewed     FamilyHistory: Reviewed     Allergies:  Reviewed     Social Hx: Reviewed.     Medications: reviewed.     Labs/imaging studies: Reviewed.     Above ATIF exam, assessment reviewed and reflect my work. I personally saw, examined, and evaluated the patient today.     I personally reviewed the medications, rhythm strips, pertinent labs and test reports. I directly participated in the medical-decision making, ordering pertinent tests and medication adjustment.     Physical exam:          Vitals:     03/04/20 1235   BP: 117/72   Pulse: 84   Resp:     Temp:     SpO2:           In general, this is a well developed, well nourished who appears stated age. awake, alert, cooperative, no apparent distress     HEENT: eyes -conjunctivae pink,   Neck-  no stridor, no carotid bruit. no jugular venous distention   RESPIRATORY: Chest symmetrical and non-tender to palpation. No accessory muscles use.    Lung auscultation - clear to auscultation except few rhonchi  CARDIOVASCULAR:     Heart Inspection shows no noted pulsations  Heart Palpation - no palpable thrills  Heart Ausculation - Regular rate and rhythm, 1/6 systolic murmur, No s3 or rub. No lower extremity edema, no varicosities. Distal pulses palpable, no clubbing or cyanosis   ABDOMEN: Soft, nontender,  Bowel sounds present. MS: n/a. : Deferred  Rectal Exam: Deferred  SKIN: warm and dry  NEURO / PSYCH: oriented to person, place           Impression/Recommendations:     Chest pain - MI ruled out - Had Stress test today.  If negative need to r/o PE due to high d-dimer.     IZQUIERDO  - No acute CHF - Echo pending      Ventricular ectopy -  Stress test report pending - Continue Metoprolol    Essential HTN - Controlled     Hx of Extensive bilateral pulmonary emboli in  2016 - On [address] [place] [address] Road for about 1 year  - Seen by Hem/Onc      Morbid obesity     Hiatal hernia     LULU - Compliant with her CPAP                     No family at bed side     Thank you for the consult.           Jose Manuel Medrano MD  3/4/2020  [facility]) Cardiology

## 2020-03-04 NOTE — CARE COORDINATION
Ss note:3/4/2020.11:37 AM Per interdisciplinary rounds pt is independent, works, is on room air. Pt for Stress test today. No transition of care needs anticipated.  SALLY Jimenez

## 2020-03-05 VITALS
WEIGHT: 293 LBS | HEIGHT: 66 IN | TEMPERATURE: 98.9 F | OXYGEN SATURATION: 99 % | RESPIRATION RATE: 18 BRPM | BODY MASS INDEX: 47.09 KG/M2 | SYSTOLIC BLOOD PRESSURE: 121 MMHG | HEART RATE: 72 BPM | DIASTOLIC BLOOD PRESSURE: 72 MMHG

## 2020-03-05 PROCEDURE — APPSS45 APP SPLIT SHARED TIME 31-45 MINUTES: Performed by: PHYSICIAN ASSISTANT

## 2020-03-05 PROCEDURE — 6370000000 HC RX 637 (ALT 250 FOR IP): Performed by: INTERNAL MEDICINE

## 2020-03-05 RX ORDER — LISINOPRIL 20 MG/1
10 TABLET ORAL DAILY
Qty: 30 TABLET | Refills: 3 | COMMUNITY
Start: 2020-03-05 | End: 2021-08-19 | Stop reason: ALTCHOICE

## 2020-03-05 RX ORDER — ASPIRIN 81 MG/1
81 TABLET ORAL DAILY
COMMUNITY
Start: 2020-03-06

## 2020-03-05 RX ORDER — NITROGLYCERIN 0.4 MG/1
TABLET SUBLINGUAL
Qty: 25 TABLET | Refills: 1 | Status: SHIPPED | OUTPATIENT
Start: 2020-03-05

## 2020-03-05 RX ADMIN — MULTIPLE VITAMINS W/ MINERALS TAB 1 TABLET: TAB at 08:30

## 2020-03-05 RX ADMIN — VITAMIN D, TAB 1000IU (100/BT) 4000 UNITS: 25 TAB at 08:29

## 2020-03-05 RX ADMIN — PANTOPRAZOLE SODIUM 40 MG: 40 TABLET, DELAYED RELEASE ORAL at 05:40

## 2020-03-05 RX ADMIN — LISINOPRIL 10 MG: 10 TABLET ORAL at 08:29

## 2020-03-05 RX ADMIN — ASPIRIN 325 MG: 325 TABLET, DELAYED RELEASE ORAL at 08:29

## 2020-03-05 RX ADMIN — METOPROLOL TARTRATE 25 MG: 25 TABLET, FILM COATED ORAL at 08:30

## 2020-03-05 ASSESSMENT — PAIN SCALES - GENERAL: PAINLEVEL_OUTOF10: 0

## 2020-03-05 NOTE — PROGRESS NOTES
Inpatient Cardiology Progress Note    Date of Service: 3/5/2020    Reason for Follow-up: Chest pain    HISTORY OF PRESENT ILLNESS:     Patient seen and examined at bedside this afternoon. Denies having any chest pain or shortness of breath since hospital admission, even with exertion. Denies any N/V, palpitations, dizziness, near syncope or syncope. No new complaints. All diagnostic testing and results thus far this admission reviewed in detail with the patient and all questions were answered. HOME MEDICATIONS:  Prior to Admission medications    Medication Sig Start Date End Date Taking?  Authorizing Provider   naproxen sodium (ALEVE) 220 MG tablet Take 220 mg by mouth 2 times daily as needed for Pain   Yes Historical Provider, MD   lisinopril (PRINIVIL;ZESTRIL) 20 MG tablet Take 20 mg by mouth daily   Yes Historical Provider, MD   atorvastatin (LIPITOR) 20 MG tablet Take 20 mg by mouth nightly   Yes Historical Provider, MD   omeprazole (PRILOSEC) 20 MG delayed release capsule Take 20 mg by mouth Daily   Yes Historical Provider, MD   Multiple Vitamins-Calcium (ONE-A-DAY WOMENS PO) Take 1 tablet by mouth daily   Yes Historical Provider, MD   Cholecalciferol 4000 UNITS CAPS Take 1 capsule by mouth daily   Yes Historical Provider, MD   febuxostat (ULORIC) 40 MG TABS tablet Take 40 mg by mouth daily    Historical Provider, MD   acetaminophen (TYLENOL) 500 MG tablet Take 500 mg by mouth every 6 hours as needed for Pain    Historical Provider, MD       CURRENT MEDICATIONS:      Current Facility-Administered Medications:     metoprolol tartrate (LOPRESSOR) tablet 25 mg, 25 mg, Oral, BID, Francisco A Lopez, DO, 25 mg at 03/05/20 0830    lisinopril (PRINIVIL;ZESTRIL) tablet 10 mg, 10 mg, Oral, Daily, Michael Davis, DO, 10 mg at 03/05/20 0397    acetaminophen (TYLENOL) tablet 500 mg, 500 mg, Oral, Q6H PRN, Michael Davis, DO    atorvastatin (LIPITOR) tablet 20 mg, 20 mg, Oral, Nightly, Francisco A Lopez, DO, 20 mg at 03/04/20 2113    Vitamin D (CHOLECALCIFEROL) tablet 4,000 Units, 4,000 Units, Oral, Daily, Dalton Nap, DO, 4,000 Units at 03/05/20 4958    pantoprazole (PROTONIX) tablet 40 mg, 40 mg, Oral, QAM AC, Dalton Nap, DO, 40 mg at 03/05/20 0540    therapeutic multivitamin-minerals 1 tablet, 1 tablet, Oral, Daily, Dalton Nap, DO, 1 tablet at 03/05/20 0830    ipratropium-albuterol (DUONEB) nebulizer solution 1 ampule, 1 ampule, Inhalation, Q4H PRN, Dalton Nap, DO    naproxen (NAPROSYN) tablet 250 mg, 250 mg, Oral, BID PRN, Dalton Nap, DO    enoxaparin (LOVENOX) injection 40 mg, 40 mg, Subcutaneous, Daily, Dalton Nap, DO, 40 mg at 03/04/20 1800    nitroGLYCERIN (NITROSTAT) SL tablet 0.4 mg, 0.4 mg, Sublingual, Q5 Min PRN, Dalton Nap, DO    aspirin EC tablet 325 mg, 325 mg, Oral, Daily, Dalton Nap, DO, 325 mg at 03/05/20 7140    ondansetron (ZOFRAN) injection 4 mg, 4 mg, Intravenous, Q6H PRN, Dalton Nap, DO      ALLERGIES:  Patient has no known allergies. REVIEW OF SYSTEMS:     · Constitutional: Denies fevers, chills, night sweats, and generalized fatigue. · HEENT: Denies headaches, nose bleeds, rhinorrhea, sore throat. Denies blurred vision. Denies dysphagia, odynophagia. · Musculoskeletal: Denies falls, pain to BLE with ambulation. Denies muscle weakness. · Neurological: Denies dizziness and lightheadedness, numbness and tingling. Denies focal neurological deficits. · Cardiovascular: Denies chest pain, palpitations, diaphoresis. Denies syncope. Denies PND, orthopnea, peripheral edema. · Respiratory: Denies shortness of breath at rest or with exertion. Denies cough, hemoptysis. · Gastrointestinal: Denies heartburn, nausea/vomiting, diarrhea and constipation, black/bloody, and tarry stools. · Genitourinary: Denies dysuria and hematuria. · Hematologic: Denies excessive bruising or bleeding.   · Endocrine: Denies excessive thirst. Denies intolerance to hot and cold. PHYSICAL EXAM:   /72   Pulse 72   Temp 98.9 °F (37.2 °C) (Oral)   Resp 18   Ht 5' 6\" (1.676 m)   Wt (!) 305 lb 9.6 oz (138.6 kg)   SpO2 99%   BMI 49.33 kg/m²   CONST:  Well developed, morbidly obese WF who appears stated age. Awake, alert, cooperative, no apparent distress. HEENT:   Head- Normocephalic, atraumatic. Eyes- Conjunctivae pink, anicteric. Throat- Oral mucosa pink and moist.  Neck-  No stridor, trachea midline, no jugular venous distention. CHEST: Chest symmetrical and non-tender to palpation. No accessory muscle use or intercostal retractions. RESPIRATORY: Lung sounds - clear throughout fields. No wheezing, rales or rhonchi. Diminished. CARDIOVASCULAR:     No carotid bruit. Heart Inspection- shows no noted pulsations. Heart Palpation- no heaves or thrills; PMI is non-displaced. Heart Ausculation- Regular rate and rhythm, no murmur. No s3, s4 or rub. PV: Trace lower extremity edema. No varicosities. Pedal pulses palpable, no clubbing or cyanosis. ABDOMEN: Soft, non-tender to light palpation. Bowel sounds present. No palpable masses no organomegaly; no abdominal bruit. MS: Good muscle strength and tone. No atrophy or abnormal movements. : Deferred  SKIN: Warm and dry. No statis dermatitis or ulcers. NEURO / PSYCH: Oriented to person, place and time. Speech clear and appropriate. Follows all commands. Pleasant affect. DATA:    Diagnostic:  All diagnostic testing and lab work thus far this admission reviewed in detail. Pharmacologic Stress Test this admission:  No significant wall motion abnormality of the LV. LVEF 75%. There is subdiaphragmatic uptake of radiotracer on the resting and post stress images. No reversible defects are seen on the post stress images to suggest ischemia. 2D TTE this admission:    Summary   Technically sub-optimal images. Normal left ventricular chamber size.    Normal left ventricular systolic function, LVEF is 65-70%. Moderate left ventricular concentric hypertrophy noted. Stage I diastolic dysfunction. Left atrium is mildly enlarged. Interatrial septum not well visualized but appears intact. Borderline right ventricular enlargement with normal function. Right atrium is mildly enlarged. No mitral valve prolapse. There is trace tricuspid regurgitation, 20mmHg. Normal aortic root size. No evidence of pericardial effusion. Pericardium appears normal.   No intra cardiac mass or thrombus. No previous echo for comparison. Chest CTA this admission:  Small hiatal hernia. Mild cardiac enlargement. No evidence of pulmonary embolism. Intake/Output Summary (Last 24 hours) at 3/5/2020 1304  Last data filed at 3/5/2020 0914  Gross per 24 hour   Intake 2100 ml   Output 0 ml   Net 2100 ml       Labs:   CBC:   Recent Labs     03/03/20 1936   WBC 6.1   HGB 14.4   HCT 43.0        BMP:   Recent Labs     03/03/20  1936 03/04/20  0504    140   K 3.8 4.1   CO2 24 23   BUN 18 20   CREATININE 0.9 0.8   LABGLOM >60 >60   CALCIUM 9.8 9.2     TSH:   Recent Labs     03/04/20  0504   TSH 1.390     CARDIAC ENZYMES:  Recent Labs     03/03/20  1936 03/04/20  0504 03/04/20  1230   TROPONINI <0.01 <0.01 <0.01     FASTING LIPID PANEL:  Lab Results   Component Value Date    CHOL 139 03/04/2020    HDL 49 03/04/2020    LDLCALC 72 03/04/2020    TRIG 92 03/04/2020     LIVER PROFILE:  Recent Labs     03/04/20  0504   AST 22   ALT 23   LABALBU 3.7         ASSESSMENT/PLAN:  Chest pain - MI ruled out with troponin negative x 3. No acute ischemic changes on EKG. No chest pain or anginal equivalent symptoms since admission. Negative pharmacologic stress testing this admission for stress induced ischemia. 2D TTE revealed normal EF with no hemodynamically significant valvular heart disease. Chest CTA negative for pulmonary embolism.      IZQUIERDO  - No acute/clinical signs of diastolic CHF.  2D TTE this admission

## 2020-03-05 NOTE — PROGRESS NOTES
Department of Internal Medicine        CHIEF COMPLAINT: Shortness of breath, chest heaviness    Reason for Admission: Same    HISTORY OF PRESENT ILLNESS:      The patient is a 60 y.o. female who presents with shortness of breath with activity and chest heaviness with activity over the last 2 weeks. This was a continuous problem for the past 2 weeks with the patient stating that is similar to when she had a PE years ago. The PE is associated with surgery. Patient also seems to think it may be from her medication Uloric. Patient did end up stopping about a week ago with her symptoms mildly improved but they are still present. She denies any fever and chills, palpitations, dizziness or syncope. Patient has not had a stress test for many years. She denies any head congestion or cough. There is no significant family history of coronary disease or MI.    3/4/2020  Patient was seen on telemetry floor. Patient denies any chest disc comfort or unusual shortness of breath with activity since admission. Routine labs reviewed with patient with a lipid panel being essentially unremarkable. Vital signs are stable. Rhythm strips show occasional unifocal PVCs with occasional ventricular quadrigeminy. Patient is set up for IV Lexiscan stress test today with the patient being a poor candidate for treadmill with her marked dyspnea. 3/5/2020  Patient seen and examined on telemetry floor. Patient denies any chest pain, or unusual shortness of breath but patient has not been out of bed since last night. Barely patient was admitted to general medical floor from primary care physician office instead of telemetry after further information was obtained. CTA of the lungs showed no evidence of PE or abnormality. Blood pressure 121/72 with a heart rate of 72.     Past Medical History:    Past Medical History:   Diagnosis Date    Anticoagulant long-term use 03/2016    Xarelto    H/O cardiovascular stress test 03/04/2020    Hemorrhoids     Hiatal hernia     Hx of blood clots 03/2016    pulmonary embolism    Hyperlipidemia     Hypertension     Obesity     morbid    Osteoarthritis     Postoperative surgical complication involving digestive system     Wears dentures     permanent upper bridge front right tooth, next 2 on right     Past Surgical History:    Past Surgical History:   Procedure Laterality Date    APPENDECTOMY      CARPAL TUNNEL RELEASE      left    COLONOSCOPY  10/26/2016    Dr. Kadi Tovar, Findings: Normal Colon to the cecum - repeat in 10 years    CYST REMOVAL      EYE SURGERY Left 2016    retina tear    GASTRIC BAND  2/27/2007    Lap; 10 cm band; with hiatal hernia repair    JOINT REPLACEMENT Left 1/29/2016    total hip    JOINT REPLACEMENT Bilateral     knees    OTHER SURGICAL HISTORY  12/05/2016    laparscopic gastric band removal and hiatal hernia repair    OVARY REMOVAL Right     right due to cyst    TOTAL KNEE ARTHROPLASTY Bilateral 2012    UPPER GASTROINTESTINAL ENDOSCOPY  2007    UPPER GASTROINTESTINAL ENDOSCOPY  10/26/2016    Dr. Kadi Tovar, Findings: GE Reflux, 2.5cm Hiatal Hernia with the slipped stomach up in the chest       Medications Prior to Admission:    @  Prior to Admission medications    Medication Sig Start Date End Date Taking?  Authorizing Provider   naproxen sodium (ALEVE) 220 MG tablet Take 220 mg by mouth 2 times daily as needed for Pain   Yes Historical Provider, MD   lisinopril (PRINIVIL;ZESTRIL) 20 MG tablet Take 20 mg by mouth daily   Yes Historical Provider, MD   atorvastatin (LIPITOR) 20 MG tablet Take 20 mg by mouth nightly   Yes Historical Provider, MD   omeprazole (PRILOSEC) 20 MG delayed release capsule Take 20 mg by mouth Daily   Yes Historical Provider, MD   Multiple Vitamins-Calcium (ONE-A-DAY WOMENS PO) Take 1 tablet by mouth daily   Yes Historical Provider, MD   Cholecalciferol 4000 UNITS CAPS Take 1 capsule by mouth daily   Yes Historical Provider, MD   febuxostat (ULORIC) 40 MG TABS tablet Take 40 mg by mouth daily    Historical Provider, MD   acetaminophen (TYLENOL) 500 MG tablet Take 500 mg by mouth every 6 hours as needed for Pain    Historical Provider, MD       Allergies:  Patient has no known allergies.     Social History:   Social History     Socioeconomic History    Marital status: Single     Spouse name: Not on file    Number of children: Not on file    Years of education: Not on file    Highest education level: Not on file   Occupational History    Not on file   Social Needs    Financial resource strain: Not on file    Food insecurity:     Worry: Not on file     Inability: Not on file    Transportation needs:     Medical: Not on file     Non-medical: Not on file   Tobacco Use    Smoking status: Never Smoker    Smokeless tobacco: Never Used   Substance and Sexual Activity    Alcohol use: No     Comment: 1 to 2 times per year    Drug use: No    Sexual activity: Never   Lifestyle    Physical activity:     Days per week: Not on file     Minutes per session: Not on file    Stress: Not on file   Relationships    Social connections:     Talks on phone: Not on file     Gets together: Not on file     Attends Jew service: Not on file     Active member of club or organization: Not on file     Attends meetings of clubs or organizations: Not on file     Relationship status: Not on file    Intimate partner violence:     Fear of current or ex partner: Not on file     Emotionally abused: Not on file     Physically abused: Not on file     Forced sexual activity: Not on file   Other Topics Concern    Not on file   Social History Narrative    Not on file       Family History:   Family History   Problem Relation Age of Onset    Cancer Father 39        bladder    No Known Problems Mother     No Known Problems Sister     No Known Problems Brother     No Known Problems Maternal Aunt     No Known Problems Maternal Uncle     Cancer Paternal Aunt         lung 9.6 06/28/2019    BASOPCT 0.9 06/28/2019    MONOSABS 0.51 06/28/2019    LYMPHSABS 1.14 06/28/2019    EOSABS 0.17 06/28/2019    BASOSABS 0.05 06/28/2019     CMP:    Lab Results   Component Value Date     03/04/2020    K 4.1 03/04/2020     03/04/2020    CO2 23 03/04/2020    BUN 20 03/04/2020    CREATININE 0.8 03/04/2020    GFRAA >60 03/04/2020    LABGLOM >60 03/04/2020    GLUCOSE 125 03/04/2020    PROT 6.2 03/04/2020    LABALBU 3.7 03/04/2020    CALCIUM 9.2 03/04/2020    BILITOT 0.7 03/04/2020    ALKPHOS 53 03/04/2020    AST 22 03/04/2020    ALT 23 03/04/2020     Magnesium:    Lab Results   Component Value Date    MG 1.9 08/23/2018     Phosphorus:  No results found for: PHOS  PT/INR:    Lab Results   Component Value Date    PROTIME 12.7 03/09/2016    INR 1.2 03/09/2016     Troponin:    Lab Results   Component Value Date    TROPONINI <0.01 03/04/2020     U/A:  No results found for: NITRITE, COLORU, PROTEINU, PHUR, LABCAST, WBCUA, RBCUA, MUCUS, TRICHOMONAS, YEAST, BACTERIA, CLARITYU, SPECGRAV, LEUKOCYTESUR, UROBILINOGEN, BILIRUBINUR, BLOODU, GLUCOSEU, AMORPHOUS  ABG:  No results found for: PH, PCO2, PO2, HCO3, BE, THGB, TCO2, O2SAT  HgBA1c:  No results found for: LABA1C  FLP:    Lab Results   Component Value Date    TRIG 92 03/04/2020    HDL 49 03/04/2020    LDLCALC 72 03/04/2020    LABVLDL 18 03/04/2020     TSH:    Lab Results   Component Value Date    TSH 1.390 03/04/2020     IRON:  No results found for: IRON  LIPASE:  No results found for: LIPASE    ASSESSMENT AND PLAN:      Patient Active Problem List    Diagnosis Date Noted    Acute pulmonary embolism ([facility] [address] 03/10/2016     Priority: High    Hyperlipidemia      Priority: Low    Obesity      Priority: Low    Primary osteoarthritis of left hip 01/14/2016     Priority: Low    IZQUIERDO (dyspnea on exertion) 03/03/2020    Family hx of colon cancer requiring screening colonoscopy 10/26/2016     1.   Midsternal chest heaviness associated with activity and shortness of breath  2. Increased shortness of breath with any activity-etiology unknown  3. Exogenous obesity  4. Hypertension  5. Hyperlipidemia  6. History of pulmonary embolism in March 2016 associated with surgery  7. Ventricular ectopy    Plan:  Meds reviewed  Telemetry in place  Pending cardiology reevaluation  Echo essentially normal  CTA of the lungs- no PE  Stop Uloric  CXR- wnl  Routine labs in a.m.   O2 saturations on room air at rest and with activity  D-dimer - 538   Lexiscan/treadmill stress test today  Lovenox 40 mg subcu daily      Lennox Eagle, D.ALYSE  3/5/2020  12:09 PM

## 2020-03-05 NOTE — DISCHARGE SUMMARY
Department of Internal Medicine        CHIEF COMPLAINT: Shortness of breath, chest heaviness    Reason for Admission: Same    HISTORY OF PRESENT ILLNESS:      The patient is a 60 y.o. female who presents with shortness of breath with activity and chest heaviness with activity over the last 2 weeks. This was a continuous problem for the past 2 weeks with the patient stating that is similar to when she had a PE years ago. The PE is associated with surgery. Patient also seems to think it may be from her medication Uloric. Patient did end up stopping about a week ago with her symptoms mildly improved but they are still present. She denies any fever and chills, palpitations, dizziness or syncope. Patient has not had a stress test for many years. She denies any head congestion or cough. There is no significant family history of coronary disease or MI.    3/4/2020  Patient was seen on telemetry floor. Patient denies any chest disc comfort or unusual shortness of breath with activity since admission. Routine labs reviewed with patient with a lipid panel being essentially unremarkable. Vital signs are stable. Rhythm strips show occasional unifocal PVCs with occasional ventricular quadrigeminy. Patient is set up for IV Lexiscan stress test today with the patient being a poor candidate for treadmill with her marked dyspnea. 3/5/2020  Patient seen and examined on telemetry floor. Patient denies any chest pain, or unusual shortness of breath but patient has not been out of bed since last night. Barely patient was admitted to general medical floor from primary care physician office instead of telemetry after further information was obtained. CTA of the lungs showed no evidence of PE or abnormality. Blood pressure 121/72 with a heart rate of 72. Case discussed with Dr Maco Johansen today and he realizes pt has ventricular ectopy and says she can go home today.   Pt is stable for discharge per Cardiology    Past Medical History:    Past Medical History:   Diagnosis Date    Anticoagulant long-term use 03/2016    Xarelto    H/O cardiovascular stress test 03/04/2020    Hemorrhoids     Hiatal hernia     Hx of blood clots 03/2016    pulmonary embolism    Hyperlipidemia     Hypertension     Obesity     morbid    Osteoarthritis     Postoperative surgical complication involving digestive system     Wears dentures     permanent upper bridge front right tooth, next 2 on right     Past Surgical History:    Past Surgical History:   Procedure Laterality Date    APPENDECTOMY      CARPAL TUNNEL RELEASE      left    COLONOSCOPY  10/26/2016    Dr. Bharathi Lin, Findings: Normal Colon to the cecum - repeat in 10 years    CYST REMOVAL      EYE SURGERY Left 2016    retina tear    GASTRIC BAND  2/27/2007    Lap; 10 cm band; with hiatal hernia repair    JOINT REPLACEMENT Left 1/29/2016    total hip    JOINT REPLACEMENT Bilateral     knees    OTHER SURGICAL HISTORY  12/05/2016    laparscopic gastric band removal and hiatal hernia repair    OVARY REMOVAL Right     right due to cyst    TOTAL KNEE ARTHROPLASTY Bilateral 2012    UPPER GASTROINTESTINAL ENDOSCOPY  2007    UPPER GASTROINTESTINAL ENDOSCOPY  10/26/2016    Dr. Bharathi Lin, Findings: GE Reflux, 2.5cm Hiatal Hernia with the slipped stomach up in the chest       Medications Prior to Admission:    @  Prior to Admission medications    Medication Sig Start Date End Date Taking?  Authorizing Provider   naproxen sodium (ALEVE) 220 MG tablet Take 220 mg by mouth 2 times daily as needed for Pain   Yes Historical Provider, MD   atorvastatin (LIPITOR) 20 MG tablet Take 20 mg by mouth nightly   Yes Historical Provider, MD   omeprazole (PRILOSEC) 20 MG delayed release capsule Take 20 mg by mouth Daily   Yes Historical Provider, MD   Multiple Vitamins-Calcium (ONE-A-DAY WOMENS PO) Take 1 tablet by mouth daily   Yes Historical Provider, MD   Cholecalciferol 4000 UNITS CAPS Take 1 capsule by mouth daily   Yes Historical Provider, MD   aspirin 81 MG EC tablet Take 1 tablet by mouth daily 3/6/20  Yes René Mata, DO   nitroGLYCERIN (NITROSTAT) 0.4 MG SL tablet up to max of 3 total doses. If no relief after 1 dose, call 911. 3/5/20  Yes René Mata, DO   lisinopril (PRINIVIL;ZESTRIL) 20 MG tablet Take 0.5 tablets by mouth daily 3/5/20  Yes Rneé Mata DO   metoprolol tartrate (LOPRESSOR) 25 MG tablet Take 1 tablet by mouth 2 times daily 3/5/20  Yes René Mata DO   acetaminophen (TYLENOL) 500 MG tablet Take 500 mg by mouth every 6 hours as needed for Pain    Historical Provider, MD       Allergies:  Patient has no known allergies.     Social History:   Social History     Socioeconomic History    Marital status: Single     Spouse name: Not on file    Number of children: Not on file    Years of education: Not on file    Highest education level: Not on file   Occupational History    Not on file   Social Needs    Financial resource strain: Not on file    Food insecurity:     Worry: Not on file     Inability: Not on file    Transportation needs:     Medical: Not on file     Non-medical: Not on file   Tobacco Use    Smoking status: Never Smoker    Smokeless tobacco: Never Used   Substance and Sexual Activity    Alcohol use: No     Comment: 1 to 2 times per year    Drug use: No    Sexual activity: Never   Lifestyle    Physical activity:     Days per week: Not on file     Minutes per session: Not on file    Stress: Not on file   Relationships    Social connections:     Talks on phone: Not on file     Gets together: Not on file     Attends Muslim service: Not on file     Active member of club or organization: Not on file     Attends meetings of clubs or organizations: Not on file     Relationship status: Not on file    Intimate partner violence:     Fear of current or ex partner: Not on file     Emotionally abused: Not on file     Physically abused: Not on file     Forced intact; motor and sensory function intact with no focal deficits  Skin:  No rashes, lesions or wounds    DATA:  CBC with Differential:    Lab Results   Component Value Date    WBC 6.1 03/03/2020    RBC 5.02 03/03/2020    HGB 14.4 03/03/2020    HCT 43.0 03/03/2020     03/03/2020    MCV 85.7 03/03/2020    MCH 28.7 03/03/2020    MCHC 33.5 03/03/2020    RDW 12.2 03/03/2020    LYMPHOPCT 21.5 06/28/2019    MONOPCT 9.6 06/28/2019    BASOPCT 0.9 06/28/2019    MONOSABS 0.51 06/28/2019    LYMPHSABS 1.14 06/28/2019    EOSABS 0.17 06/28/2019    BASOSABS 0.05 06/28/2019     CMP:    Lab Results   Component Value Date     03/04/2020    K 4.1 03/04/2020     03/04/2020    CO2 23 03/04/2020    BUN 20 03/04/2020    CREATININE 0.8 03/04/2020    GFRAA >60 03/04/2020    LABGLOM >60 03/04/2020    GLUCOSE 125 03/04/2020    PROT 6.2 03/04/2020    LABALBU 3.7 03/04/2020    CALCIUM 9.2 03/04/2020    BILITOT 0.7 03/04/2020    ALKPHOS 53 03/04/2020    AST 22 03/04/2020    ALT 23 03/04/2020     Magnesium:    Lab Results   Component Value Date    MG 1.9 08/23/2018     Phosphorus:  No results found for: PHOS  PT/INR:    Lab Results   Component Value Date    PROTIME 12.7 03/09/2016    INR 1.2 03/09/2016     Troponin:    Lab Results   Component Value Date    TROPONINI <0.01 03/04/2020     U/A:  No results found for: NITRITE, COLORU, PROTEINU, PHUR, LABCAST, WBCUA, RBCUA, MUCUS, TRICHOMONAS, YEAST, BACTERIA, CLARITYU, SPECGRAV, LEUKOCYTESUR, UROBILINOGEN, BILIRUBINUR, BLOODU, GLUCOSEU, AMORPHOUS  ABG:  No results found for: PH, PCO2, PO2, HCO3, BE, THGB, TCO2, O2SAT  HgBA1c:  No results found for: LABA1C  FLP:    Lab Results   Component Value Date    TRIG 92 03/04/2020    HDL 49 03/04/2020    LDLCALC 72 03/04/2020    LABVLDL 18 03/04/2020     TSH:    Lab Results   Component Value Date    TSH 1.390 03/04/2020     IRON:  No results found for: IRON  LIPASE:  No results found for: LIPASE    ASSESSMENT AND PLAN:      Patient Active Problem List    Diagnosis Date Noted    Acute pulmonary embolism ([facility] [address] 03/10/2016     Priority: High    Hyperlipidemia      Priority: Low    Obesity      Priority: Low    Primary osteoarthritis of left hip 01/14/2016     Priority: Low    IZQUIERDO (dyspnea on exertion) 03/03/2020    Family hx of colon cancer requiring screening colonoscopy 10/26/2016     1. Midsternal chest heaviness associated with activity and shortness of breath  2. Increased shortness of breath with any activity-etiology unknown  3. Exogenous obesity  4. Hypertension  5. Hyperlipidemia  6. History of pulmonary embolism in March 2016 associated with surgery  7. Ventricular ectopy-unifocal PVC's- occ bigemeny,quadrigemeny  8. LULU on CPAP    Plan:  Discharge home today   Start [facility] ASA 81 mg qd  Start Metoprolol 25 mg BID  Decrease Lisinopril 10 QD  Nitro 1/150 sl prn  Resume home meds    F/U with PCP 1 week        Yue Armenta D.O.  3/5/2020  3:37 PM

## 2020-03-06 LAB
EKG ATRIAL RATE: 74 BPM
EKG P AXIS: 16 DEGREES
EKG P-R INTERVAL: 148 MS
EKG Q-T INTERVAL: 400 MS
EKG QRS DURATION: 78 MS
EKG QTC CALCULATION (BAZETT): 444 MS
EKG R AXIS: 2 DEGREES
EKG T AXIS: 12 DEGREES
EKG VENTRICULAR RATE: 74 BPM

## 2020-03-06 PROCEDURE — 93010 ELECTROCARDIOGRAM REPORT: CPT | Performed by: INTERNAL MEDICINE

## 2020-04-06 ENCOUNTER — TELEPHONE (OUTPATIENT)
Dept: CARDIOLOGY CLINIC | Age: 65
End: 2020-04-06

## 2020-04-24 ENCOUNTER — VIRTUAL VISIT (OUTPATIENT)
Dept: CARDIOLOGY CLINIC | Age: 65
End: 2020-04-24
Payer: COMMERCIAL

## 2020-04-24 VITALS
HEART RATE: 68 BPM | DIASTOLIC BLOOD PRESSURE: 91 MMHG | SYSTOLIC BLOOD PRESSURE: 128 MMHG | HEIGHT: 66 IN | BODY MASS INDEX: 47.09 KG/M2 | WEIGHT: 293 LBS

## 2020-04-24 PROCEDURE — 99213 OFFICE O/P EST LOW 20 MIN: CPT | Performed by: INTERNAL MEDICINE

## 2020-04-24 NOTE — PROGRESS NOTES
TELEHEALTH EVALUATION -- Audio/Visual (During QYFWX-56 public health emergency)    Reason for Virtual video visit:  PVCs, HTN, Post hospital visit      History of Present illness:   60 yr pt with HTN, dyslipidemia, PE had virtual vi ist today  Seen at [facility] in March, Had stress tet due to PVCs  C/o mild SOBOE, chronic  Denies any exertional chest pain  No palpitations, dizzy or syncope  No PND or orthopnea  Compliant with medications  No recent surgeries or hospitalization    Review of systems:  Review of 8 systems negative except as mentioned in the HPI    Medical/ Surgical history: Reviewed    Allergies:  Reviewed    Social Hx: Reviewed. Medications: Reviewed. Lexiscan stress 3/4/2002    Findings: Comparison: Previous study dated 1/26/2007 was reviewed.       The estimated left ventricular ejection fraction is 75% .  The left   ventricle is not dilated.       IMPRESSION: Left ventricular ejection fraction of  75         Physical exam:     Not examined since this is a Virtual visit due to COVID-19 pandemic    Vitals:    04/24/20 0845   BP: (!) 128/91   Pulse: 68           Impression/Recommendations:        SOBOE  - Multifactorial      Ventricular ectopy -   Continue Metoprolol, If symptomatic 24-48 hour Holter, avoid excessive cafeine     Essential HTN - Continue Lisinopril, Metoprolol, Monitor BP      Hx of Extensive bilateral pulmonary emboli in  2016 - On [address] [place] [address] Road for about 1 year  - Seen by Hem/Onc      Morbid obesity - Diet, exercise and weight loss discussed.     Dyslipidemia - On Statin    LULU - Compliant with her CPAP       Patient evaluated by a Virtual Visit (video visit) encounter to address concerns as mentioned above. A caregiver was present when appropriate. Due to this being a TeleHealth encounter (During SKXIM-74 public health emergency), evaluation of the following organ systems was limited: Vitals/Constitutional/EENT/Resp/CV/GI//MS/Neuro/Skin/Heme-Lymph-Imm.   Pursuant to the emergency

## 2020-06-28 ENCOUNTER — HOSPITAL ENCOUNTER (EMERGENCY)
Age: 65
Discharge: HOME | End: 2020-06-28
Payer: COMMERCIAL

## 2020-06-28 ENCOUNTER — APPOINTMENT (OUTPATIENT)
Dept: GENERAL RADIOLOGY | Age: 65
End: 2020-06-28
Payer: COMMERCIAL

## 2020-06-28 ENCOUNTER — NURSE TRIAGE (OUTPATIENT)
Dept: OTHER | Facility: CLINIC | Age: 65
End: 2020-06-28

## 2020-06-28 VITALS
WEIGHT: 293 LBS | HEART RATE: 85 BPM | DIASTOLIC BLOOD PRESSURE: 85 MMHG | RESPIRATION RATE: 18 BRPM | BODY MASS INDEX: 49.23 KG/M2 | OXYGEN SATURATION: 95 % | TEMPERATURE: 98 F | SYSTOLIC BLOOD PRESSURE: 132 MMHG

## 2020-06-28 PROCEDURE — 99212 OFFICE O/P EST SF 10 MIN: CPT

## 2020-06-28 PROCEDURE — 73630 X-RAY EXAM OF FOOT: CPT

## 2020-06-28 RX ORDER — METHYLPREDNISOLONE 4 MG/1
TABLET ORAL
Qty: 1 KIT | Refills: 0 | Status: SHIPPED | OUTPATIENT
Start: 2020-06-28 | End: 2021-03-03

## 2020-06-28 NOTE — ED PROVIDER NOTES
This is a 77-year-old female that presents to urgent care complaining of right heel pain for the past couple days. She does state a history of heel spurs in the past that was treated conservatively. She denies any injury. States pain is worse with movement and walking. No numbness or tingling. Denies injury. Review of Systems   Constitutional:        Pertinent positives and negatives are stated within HPI, all other systems reviewed and are negative. Physical Exam  Vitals signs and nursing note reviewed. Constitutional:       Appearance: She is well-developed. HENT:      Head: Normocephalic and atraumatic. Right Ear: Hearing and external ear normal.      Left Ear: Hearing and external ear normal.      Nose: Nose normal.      Mouth/Throat:      Pharynx: Uvula midline. Eyes:      General: Lids are normal.      Conjunctiva/sclera: Conjunctivae normal.      Pupils: Pupils are equal, round, and reactive to light. Neck:      Musculoskeletal: Normal range of motion and neck supple. Cardiovascular:      Rate and Rhythm: Normal rate and regular rhythm. Heart sounds: Normal heart sounds. No murmur. Pulmonary:      Effort: Pulmonary effort is normal.      Breath sounds: Normal breath sounds. Abdominal:      General: Bowel sounds are normal.      Palpations: Abdomen is soft. Abdomen is not rigid. Tenderness: There is no abdominal tenderness. There is no guarding or rebound. Musculoskeletal:      Right hip: Normal.      Right knee: Normal.      Right ankle: Normal. Achilles tendon normal.      Comments: Right foot heel area some mild tenderness at this time. Patient states more tenderness occurs when she tries to stand on the foot and walk. I do not appreciate any open area or redness no cyanosis has palpable pedal pulses. Slight swelling noted. No ankle pain. Skin:     General: Skin is warm and dry. Findings: No abrasion or rash.    Neurological:      Mental Status: reviewed. Allergies: Patient has no known allergies. -------------------------------------------------- RESULTS -------------------------------------------------  No results found for this visit on 06/28/20. XR FOOT RIGHT (MIN 3 VIEWS)   Final Result   No acute osseous abnormality of the right foot.             ------------------------- NURSING NOTES AND VITALS REVIEWED ---------------------------   The nursing notes within the ED encounter and vital signs as below have been reviewed. /85   Pulse 85   Temp 98 °F (36.7 °C) (Oral)   Resp 18   Wt (!) 305 lb (138.3 kg)   SpO2 95%   BMI 49.23 kg/m²   Oxygen Saturation Interpretation: Normal      ------------------------------------------ PROGRESS NOTES ------------------------------------------   I have spoken with the patient and discussed todays results, in addition to providing specific details for the plan of care and counseling regarding the diagnosis and prognosis. Their questions are answered at this time and they are agreeable with the plan.      --------------------------------- ADDITIONAL PROVIDER NOTES ---------------------------------     This patient is stable for discharge. I have shared the specific conditions for return, as well as the importance of follow-up. * NOTE: This report was transcribed using voice recognition software. Every effort was made to ensure accuracy; however, inadvertent computerized transcription errors may be present.    --------------------------------- IMPRESSION AND DISPOSITION ---------------------------------    IMPRESSION  1. Pain of right heel    2.  Calcaneal spur of right foot        DISPOSITION  Disposition: Discharge to home  Patient condition is good         Sarai Larsen PA-C  06/28/20 152

## 2020-06-28 NOTE — TELEPHONE ENCOUNTER
Reason for Disposition   [1] SEVERE pain (e.g., excruciating, unable to do any normal activities) AND [2] not improved after 2 hours of pain medicine    Answer Assessment - Initial Assessment Questions  1. ONSET: \"When did the pain start? \"       Yesterday. No injury    2. LOCATION: \"Where is the pain located? \"       Right heel    3. PAIN: \"How bad is the pain? \"    (Scale 1-10; or mild, moderate, severe)    -  MILD (1-3): doesn't interfere with normal activities     -  MODERATE (4-7): interferes with normal activities (e.g., work or school) or awakens from sleep, limping     -  SEVERE (8-10): excruciating pain, unable to do any normal activities, unable to walk      8/10 with weightbearing    4. WORK OR EXERCISE: \"Has there been any recent work or exercise that involved this part of the body? \"       Denies    5. CAUSE: \"What do you think is causing the foot pain? \"      Heels club    6. OTHER SYMPTOMS: \"Do you have any other symptoms? \" (e.g., leg pain, rash, fever, numbness)      Foot is swollen. No redness. 7. PREGNANCY: \"Is there any chance you are pregnant? \" \"When was your last menstrual period? \"      NA    Protocols used: FOOT PAIN-ADULT-    Calls via [employer] benefit line. See above questions and answers. Caller talking full sentences without any distress on phone. Discussed disposition and patient agreeable to go to [facility] or ED today. Discussed potential consequences for not following disposition recommendation. Aware to call back with with any concerns or persistent, worsening, or new symptoms develop. Please do not respond to the triage nurse through this encounter. Any subsequent communication should be directly with the patient.

## 2020-08-28 ENCOUNTER — HOSPITAL ENCOUNTER (OUTPATIENT)
Age: 65
Discharge: HOME | End: 2020-08-28
Payer: COMMERCIAL

## 2020-08-28 LAB
ALBUMIN SERPL-MCNC: 4 G/DL (ref 3.5–5.2)
ALP BLD-CCNC: 71 U/L (ref 35–104)
ALT SERPL-CCNC: 23 U/L (ref 0–32)
ANION GAP SERPL CALCULATED.3IONS-SCNC: 13 MMOL/L (ref 7–16)
AST SERPL-CCNC: 22 U/L (ref 0–31)
BASOPHILS ABSOLUTE: 0.03 E9/L (ref 0–0.2)
BASOPHILS RELATIVE PERCENT: 0.7 % (ref 0–2)
BILIRUB SERPL-MCNC: 0.4 MG/DL (ref 0–1.2)
BUN BLDV-MCNC: 21 MG/DL (ref 8–23)
CALCIUM SERPL-MCNC: 9.3 MG/DL (ref 8.6–10.2)
CHLORIDE BLD-SCNC: 103 MMOL/L (ref 98–107)
CHOLESTEROL, TOTAL: 136 MG/DL (ref 0–199)
CO2: 23 MMOL/L (ref 22–29)
CREAT SERPL-MCNC: 0.8 MG/DL (ref 0.5–1)
EOSINOPHILS ABSOLUTE: 0.14 E9/L (ref 0.05–0.5)
EOSINOPHILS RELATIVE PERCENT: 3.5 % (ref 0–6)
GFR AFRICAN AMERICAN: >60
GFR NON-AFRICAN AMERICAN: >60 ML/MIN/1.73
GLUCOSE BLD-MCNC: 128 MG/DL (ref 74–99)
HCT VFR BLD CALC: 41.5 % (ref 34–48)
HDLC SERPL-MCNC: 47 MG/DL
HEMOGLOBIN: 13.6 G/DL (ref 11.5–15.5)
IMMATURE GRANULOCYTES #: 0.01 E9/L
IMMATURE GRANULOCYTES %: 0.2 % (ref 0–5)
LDL CHOLESTEROL CALCULATED: 62 MG/DL (ref 0–99)
LYMPHOCYTES ABSOLUTE: 1.25 E9/L (ref 1.5–4)
LYMPHOCYTES RELATIVE PERCENT: 30.9 % (ref 20–42)
MCH RBC QN AUTO: 28 PG (ref 26–35)
MCHC RBC AUTO-ENTMCNC: 32.8 % (ref 32–34.5)
MCV RBC AUTO: 85.6 FL (ref 80–99.9)
MONOCYTES ABSOLUTE: 0.36 E9/L (ref 0.1–0.95)
MONOCYTES RELATIVE PERCENT: 8.9 % (ref 2–12)
NEUTROPHILS ABSOLUTE: 2.26 E9/L (ref 1.8–7.3)
NEUTROPHILS RELATIVE PERCENT: 55.8 % (ref 43–80)
PDW BLD-RTO: 11.9 FL (ref 11.5–15)
PLATELET # BLD: 21 E9/L (ref 130–450)
PLATELET CONFIRMATION: NORMAL
PMV BLD AUTO: 11.3 FL (ref 7–12)
POTASSIUM SERPL-SCNC: 4.7 MMOL/L (ref 3.5–5)
RBC # BLD: 4.85 E12/L (ref 3.5–5.5)
SODIUM BLD-SCNC: 139 MMOL/L (ref 132–146)
TOTAL PROTEIN: 6.8 G/DL (ref 6.4–8.3)
TRIGL SERPL-MCNC: 133 MG/DL (ref 0–149)
TSH SERPL DL<=0.05 MIU/L-ACNC: 1.13 UIU/ML (ref 0.27–4.2)
URIC ACID, SERUM: 5.4 MG/DL (ref 2.4–5.7)
VLDLC SERPL CALC-MCNC: 27 MG/DL
WBC # BLD: 4.1 E9/L (ref 4.5–11.5)

## 2020-08-28 PROCEDURE — 36415 COLL VENOUS BLD VENIPUNCTURE: CPT

## 2020-08-28 PROCEDURE — 80061 LIPID PANEL: CPT

## 2020-08-28 PROCEDURE — 84443 ASSAY THYROID STIM HORMONE: CPT

## 2020-08-28 PROCEDURE — 85025 COMPLETE CBC W/AUTO DIFF WBC: CPT

## 2020-08-28 PROCEDURE — 84550 ASSAY OF BLOOD/URIC ACID: CPT

## 2020-08-28 PROCEDURE — 80053 COMPREHEN METABOLIC PANEL: CPT

## 2020-08-31 ENCOUNTER — HOSPITAL ENCOUNTER (OUTPATIENT)
Age: 65
Discharge: HOME | End: 2020-08-31
Payer: COMMERCIAL

## 2020-08-31 LAB
BASOPHILS ABSOLUTE: 0.03 E9/L (ref 0–0.2)
BASOPHILS RELATIVE PERCENT: 0.5 % (ref 0–2)
EOSINOPHILS ABSOLUTE: 0.24 E9/L (ref 0.05–0.5)
EOSINOPHILS RELATIVE PERCENT: 4.3 % (ref 0–6)
HCT VFR BLD CALC: 41.3 % (ref 34–48)
HEMOGLOBIN: 13.5 G/DL (ref 11.5–15.5)
IMMATURE GRANULOCYTES #: 0.01 E9/L
IMMATURE GRANULOCYTES %: 0.2 % (ref 0–5)
LYMPHOCYTES ABSOLUTE: 1.5 E9/L (ref 1.5–4)
LYMPHOCYTES RELATIVE PERCENT: 27.2 % (ref 20–42)
MCH RBC QN AUTO: 27.9 PG (ref 26–35)
MCHC RBC AUTO-ENTMCNC: 32.7 % (ref 32–34.5)
MCV RBC AUTO: 85.3 FL (ref 80–99.9)
MONOCYTES ABSOLUTE: 0.52 E9/L (ref 0.1–0.95)
MONOCYTES RELATIVE PERCENT: 9.4 % (ref 2–12)
NEUTROPHILS ABSOLUTE: 3.22 E9/L (ref 1.8–7.3)
NEUTROPHILS RELATIVE PERCENT: 58.4 % (ref 43–80)
PDW BLD-RTO: 11.9 FL (ref 11.5–15)
PLATELET # BLD: 231 E9/L (ref 130–450)
PMV BLD AUTO: 9.3 FL (ref 7–12)
RBC # BLD: 4.84 E12/L (ref 3.5–5.5)
WBC # BLD: 5.5 E9/L (ref 4.5–11.5)

## 2020-08-31 PROCEDURE — 36415 COLL VENOUS BLD VENIPUNCTURE: CPT

## 2020-08-31 PROCEDURE — 85025 COMPLETE CBC W/AUTO DIFF WBC: CPT

## 2021-02-04 ENCOUNTER — HOSPITAL ENCOUNTER (OUTPATIENT)
Age: 66
Discharge: HOME | End: 2021-02-04
Payer: COMMERCIAL

## 2021-02-04 LAB
ALBUMIN SERPL-MCNC: 3.8 G/DL (ref 3.5–5.2)
ALP BLD-CCNC: 70 U/L (ref 35–104)
ALT SERPL-CCNC: 21 U/L (ref 0–32)
ANION GAP SERPL CALCULATED.3IONS-SCNC: 9 MMOL/L (ref 7–16)
AST SERPL-CCNC: 21 U/L (ref 0–31)
BASOPHILS ABSOLUTE: 0.06 E9/L (ref 0–0.2)
BASOPHILS RELATIVE PERCENT: 1.4 % (ref 0–2)
BILIRUB SERPL-MCNC: 0.5 MG/DL (ref 0–1.2)
BUN BLDV-MCNC: 17 MG/DL (ref 8–23)
CALCIUM SERPL-MCNC: 9 MG/DL (ref 8.6–10.2)
CHLORIDE BLD-SCNC: 102 MMOL/L (ref 98–107)
CHOLESTEROL, FASTING: 135 MG/DL (ref 0–199)
CO2: 26 MMOL/L (ref 22–29)
CREAT SERPL-MCNC: 0.9 MG/DL (ref 0.5–1)
EOSINOPHILS ABSOLUTE: 0.17 E9/L (ref 0.05–0.5)
EOSINOPHILS RELATIVE PERCENT: 3.9 % (ref 0–6)
GFR AFRICAN AMERICAN: >60
GFR NON-AFRICAN AMERICAN: >60 ML/MIN/1.73
GLUCOSE FASTING: 155 MG/DL (ref 74–99)
HCT VFR BLD CALC: 37.1 % (ref 34–48)
HDLC SERPL-MCNC: 41 MG/DL
HEMOGLOBIN: 11.6 G/DL (ref 11.5–15.5)
IMMATURE GRANULOCYTES #: 0.03 E9/L
IMMATURE GRANULOCYTES %: 0.7 % (ref 0–5)
LDL CHOLESTEROL CALCULATED: 63 MG/DL (ref 0–99)
LYMPHOCYTES ABSOLUTE: 1.55 E9/L (ref 1.5–4)
LYMPHOCYTES RELATIVE PERCENT: 36 % (ref 20–42)
MCH RBC QN AUTO: 24.4 PG (ref 26–35)
MCHC RBC AUTO-ENTMCNC: 31.3 % (ref 32–34.5)
MCV RBC AUTO: 78.1 FL (ref 80–99.9)
MONOCYTES ABSOLUTE: 0.37 E9/L (ref 0.1–0.95)
MONOCYTES RELATIVE PERCENT: 8.6 % (ref 2–12)
NEUTROPHILS ABSOLUTE: 2.13 E9/L (ref 1.8–7.3)
NEUTROPHILS RELATIVE PERCENT: 49.4 % (ref 43–80)
PDW BLD-RTO: 13.4 FL (ref 11.5–15)
PLATELET # BLD: 274 E9/L (ref 130–450)
PMV BLD AUTO: 9.6 FL (ref 7–12)
POTASSIUM SERPL-SCNC: 4.4 MMOL/L (ref 3.5–5)
RBC # BLD: 4.75 E12/L (ref 3.5–5.5)
SODIUM BLD-SCNC: 137 MMOL/L (ref 132–146)
TOTAL PROTEIN: 6.5 G/DL (ref 6.4–8.3)
TRIGLYCERIDE, FASTING: 153 MG/DL (ref 0–149)
TSH SERPL DL<=0.05 MIU/L-ACNC: 1.38 UIU/ML (ref 0.27–4.2)
VLDLC SERPL CALC-MCNC: 31 MG/DL
WBC # BLD: 4.3 E9/L (ref 4.5–11.5)

## 2021-02-04 PROCEDURE — 80053 COMPREHEN METABOLIC PANEL: CPT

## 2021-02-04 PROCEDURE — 36415 COLL VENOUS BLD VENIPUNCTURE: CPT

## 2021-02-04 PROCEDURE — 85025 COMPLETE CBC W/AUTO DIFF WBC: CPT

## 2021-02-04 PROCEDURE — 80061 LIPID PANEL: CPT

## 2021-02-04 PROCEDURE — 84443 ASSAY THYROID STIM HORMONE: CPT

## 2021-02-12 ENCOUNTER — HOSPITAL ENCOUNTER (OUTPATIENT)
Dept: MAMMOGRAPHY | Age: 66
Discharge: HOME | End: 2021-02-14
Payer: COMMERCIAL

## 2021-02-12 DIAGNOSIS — Z12.31 BREAST CANCER SCREENING BY MAMMOGRAM: ICD-10-CM

## 2021-02-12 PROCEDURE — 77063 BREAST TOMOSYNTHESIS BI: CPT

## 2021-03-03 ENCOUNTER — OFFICE VISIT (OUTPATIENT)
Dept: ONCOLOGY | Age: 66
End: 2021-03-03
Payer: COMMERCIAL

## 2021-03-03 ENCOUNTER — HOSPITAL ENCOUNTER (OUTPATIENT)
Dept: INFUSION THERAPY | Age: 66
Discharge: HOME | End: 2021-03-03
Payer: COMMERCIAL

## 2021-03-03 VITALS
BODY MASS INDEX: 47.09 KG/M2 | TEMPERATURE: 98 F | HEART RATE: 68 BPM | RESPIRATION RATE: 18 BRPM | DIASTOLIC BLOOD PRESSURE: 86 MMHG | HEIGHT: 66 IN | OXYGEN SATURATION: 97 % | SYSTOLIC BLOOD PRESSURE: 165 MMHG | WEIGHT: 293 LBS

## 2021-03-03 DIAGNOSIS — I26.99 OTHER ACUTE PULMONARY EMBOLISM, UNSPECIFIED WHETHER ACUTE COR PULMONALE PRESENT (HCC): Primary | ICD-10-CM

## 2021-03-03 DIAGNOSIS — R71.8 MICROCYTOSIS: ICD-10-CM

## 2021-03-03 DIAGNOSIS — D72.819 LEUKOPENIA, UNSPECIFIED TYPE: ICD-10-CM

## 2021-03-03 DIAGNOSIS — D72.819 LEUKOPENIA, UNSPECIFIED TYPE: Primary | ICD-10-CM

## 2021-03-03 LAB
BASOPHILS ABSOLUTE: 0.04 E9/L (ref 0–0.2)
BASOPHILS RELATIVE PERCENT: 0.8 % (ref 0–2)
EOSINOPHILS ABSOLUTE: 0.19 E9/L (ref 0.05–0.5)
EOSINOPHILS RELATIVE PERCENT: 3.9 % (ref 0–6)
FERRITIN: 13 NG/ML
FOLATE: 9.6 NG/ML (ref 4.8–24.2)
HCT VFR BLD CALC: 37.2 % (ref 34–48)
HEMOGLOBIN: 11.2 G/DL (ref 11.5–15.5)
IMMATURE GRANULOCYTES #: 0.01 E9/L
IMMATURE GRANULOCYTES %: 0.2 % (ref 0–5)
IRON SATURATION: 9 % (ref 15–50)
IRON: 38 MCG/DL (ref 37–145)
LYMPHOCYTES ABSOLUTE: 1.42 E9/L (ref 1.5–4)
LYMPHOCYTES RELATIVE PERCENT: 28.9 % (ref 20–42)
MCH RBC QN AUTO: 24.2 PG (ref 26–35)
MCHC RBC AUTO-ENTMCNC: 30.1 % (ref 32–34.5)
MCV RBC AUTO: 80.3 FL (ref 80–99.9)
MONOCYTES ABSOLUTE: 0.4 E9/L (ref 0.1–0.95)
MONOCYTES RELATIVE PERCENT: 8.1 % (ref 2–12)
NEUTROPHILS ABSOLUTE: 2.86 E9/L (ref 1.8–7.3)
NEUTROPHILS RELATIVE PERCENT: 58.1 % (ref 43–80)
PDW BLD-RTO: 14 FL (ref 11.5–15)
PLATELET # BLD: 239 E9/L (ref 130–450)
PMV BLD AUTO: 9.7 FL (ref 7–12)
RBC # BLD: 4.63 E12/L (ref 3.5–5.5)
TOTAL IRON BINDING CAPACITY: 407 MCG/DL (ref 250–450)
VITAMIN B-12: 322 PG/ML (ref 211–946)
WBC # BLD: 4.9 E9/L (ref 4.5–11.5)

## 2021-03-03 PROCEDURE — 80074 ACUTE HEPATITIS PANEL: CPT

## 2021-03-03 PROCEDURE — 82746 ASSAY OF FOLIC ACID SERUM: CPT

## 2021-03-03 PROCEDURE — 82728 ASSAY OF FERRITIN: CPT

## 2021-03-03 PROCEDURE — 36415 COLL VENOUS BLD VENIPUNCTURE: CPT

## 2021-03-03 PROCEDURE — 99214 OFFICE O/P EST MOD 30 MIN: CPT

## 2021-03-03 PROCEDURE — 83540 ASSAY OF IRON: CPT

## 2021-03-03 PROCEDURE — 83550 IRON BINDING TEST: CPT

## 2021-03-03 PROCEDURE — 86703 HIV-1/HIV-2 1 RESULT ANTBDY: CPT

## 2021-03-03 PROCEDURE — 82607 VITAMIN B-12: CPT

## 2021-03-03 PROCEDURE — 85025 COMPLETE CBC W/AUTO DIFF WBC: CPT

## 2021-03-03 PROCEDURE — 86038 ANTINUCLEAR ANTIBODIES: CPT

## 2021-03-03 PROCEDURE — 88185 FLOWCYTOMETRY/TC ADD-ON: CPT

## 2021-03-03 PROCEDURE — 99205 OFFICE O/P NEW HI 60 MIN: CPT | Performed by: INTERNAL MEDICINE

## 2021-03-03 PROCEDURE — 88184 FLOWCYTOMETRY/ TC 1 MARKER: CPT

## 2021-03-03 RX ORDER — METFORMIN HYDROCHLORIDE 500 MG/1
500 TABLET, EXTENDED RELEASE ORAL
COMMUNITY

## 2021-03-03 NOTE — PROGRESS NOTES
[address]  Dept: [phone]  Loc: [phone]  Attending Consult Note      Reason for Visit:   Leukopenia and microcytosis. Referring Physician: Juaquin Gonzalez CNP    PCP:  Rony Vasques DO    History of Present Illness: The patient is a very pleasant 63-year-old lady,  with a PMH significant for hyperlipedemia, obesity, OA, I met the patient initially in 2016 after diagnosis with PE,she underwent on 1/29/2016 a left total hip arthroplasty, the patient was on Xarelto for about 2 weeks following her surgery. On 3/8/2016 the patient started having chest pain, shortness of breath, palpitations and lightheadedness, she was seen at the ED on 3/9/2916, had a CTA chest done revealing Extensive filling defects involve the distal central right and left pulmonary arteries, in addition to the bilateral upper and lower lobe and right middle lobe pulmonary arteries. Findings are compatible with pulmonary emboli. She had b/l LE venous ultrasounds done, were negative for DVTs. The patient was on Xarelto for 1 year, it was discontinued on 3/22/2017. No recurrent DVTs. She continues to be on baby aspirin. The patient has a history of intermittent leukopenia, her CBCD from 2/4/2021 was remarkable for a white count of 4.3, ANC 2130, hemoglobin 11.6, hematocrit 37.1, MCV 78.1, platelets 566M. The patient denies any fever, no recurrent infections, no unintentional weight loss. The patient donates blood about once every 8 weeks. Review of Systems;  CONSTITUTIONAL: No fever, chills. Good appetite, feeling tired. ENMT: Eyes: No diplopia; Nose: No epistaxis. Mouth: No sore throat. RESPIRATORY: No hemoptysis, positive for shortness of breath on exertion, chest CTA was negative for PE, no cough. CARDIOVASCULAR: No chest pain, palpitations. GASTROINTESTINAL: No nausea/vomiting, abdominal pain, diarrhea/constipation.   GENITOURINARY: No dysuria, urinary frequency, hematuria. NEURO: No syncope, presyncope, headache.   Remainder:  ROS NEGATIVE    Past Medical History:      Diagnosis Date    Anticoagulant long-term use 03/2016    Xarelto    Asthma     H/O cardiovascular stress test 03/04/2020    Hemorrhoids     Hiatal hernia     Hx of blood clots 03/2016    pulmonary embolism    Hyperlipidemia     Hypertension     Obesity     morbid    Osteoarthritis     Postoperative surgical complication involving digestive system     Wears dentures     permanent upper bridge front right tooth, next 2 on right     Patient Active Problem List   Diagnosis    Primary osteoarthritis of left hip    Acute pulmonary embolism (HCC)    Hyperlipidemia    Obesity    Family hx of colon cancer requiring screening colonoscopy    IZQUIERDO (dyspnea on exertion)        Past Surgical History:      Procedure Laterality Date    APPENDECTOMY      CARPAL TUNNEL RELEASE      left    COLONOSCOPY  10/26/2016    Dr. Zayda Good, Findings: Normal Colon to the cecum - repeat in 10 years    CYST REMOVAL      EYE SURGERY Left 2016    retina tear    GASTRIC BAND  2/27/2007    Lap; 10 cm band; with hiatal hernia repair    JOINT REPLACEMENT Left 1/29/2016    total hip    JOINT REPLACEMENT Bilateral     knees    OTHER SURGICAL HISTORY  12/05/2016    laparscopic gastric band removal and hiatal hernia repair    OVARY REMOVAL Right     right due to cyst    TOTAL KNEE ARTHROPLASTY Bilateral 2012    UPPER GASTROINTESTINAL ENDOSCOPY  2007    UPPER GASTROINTESTINAL ENDOSCOPY  10/26/2016    Dr. Zayda Good, Findings: GE Reflux, 2.5cm Hiatal Hernia with the slipped stomach up in the chest       Family History:  Family History   Problem Relation Age of Onset    Cancer Father 39        bladder    No Known Problems Mother     No Known Problems Sister     No Known Problems Brother     No Known Problems Maternal Aunt     No Known Problems Maternal Uncle     Cancer Paternal Aunt         lung    No Known Problems Paternal Uncle     No Known Problems Maternal Grandmother     No Known Problems Maternal Grandfather     No Known Problems Paternal Grandmother     No Known Problems Paternal Grandfather     Mult Sclerosis Paternal Cousin        Medications:  Reviewed and reconciled. Social History:  Social History     Socioeconomic History    Marital status: Single     Spouse name: Not on file    Number of children: Not on file    Years of education: Not on file    Highest education level: Not on file   Occupational History    Not on file   Social Needs    Financial resource strain: Not on file    Food insecurity     Worry: Not on file     Inability: Not on file    Transportation needs     Medical: Not on file     Non-medical: Not on file   Tobacco Use    Smoking status: Never Smoker    Smokeless tobacco: Never Used   Substance and Sexual Activity    Alcohol use: No     Comment: 1 to 2 times per year. Hot tea andice tea 1 cup a day.     Drug use: No    Sexual activity: Never   Lifestyle    Physical activity     Days per week: Not on file     Minutes per session: Not on file    Stress: Not on file   Relationships    Social connections     Talks on phone: Not on file     Gets together: Not on file     Attends Mosque service: Not on file     Active member of club or organization: Not on file     Attends meetings of clubs or organizations: Not on file     Relationship status: Not on file    Intimate partner violence     Fear of current or ex partner: Not on file     Emotionally abused: Not on file     Physically abused: Not on file     Forced sexual activity: Not on file   Other Topics Concern    Not on file   Social History Narrative    Not on file       Allergies:  No Known Allergies    Physical Exam:  BP (!) 165/86   Pulse 68   Temp 98 °F (36.7 °C)   Resp 18   Ht 5' 6\" (1.676 m)   Wt (!) 325 lb 8 oz (147.6 kg)   SpO2 97%   BMI 52.54 kg/m²   GENERAL: Alert, oriented x 3, not in acute distress. HEENT: PERRLA; EOMI. Oropharynx clear. NECK: Supple. No palpable cervical or supraclavicular lymphadenopathy. LUNGS: Good air entry bilaterally. No wheezing, crackles or rhonchi. CARDIOVASCULAR: Regular rate. No murmurs, rubs or gallops. ABDOMEN: Soft. Non-tender, non-distended. Positive bowel sounds. EXTREMITIES: Without clubbing, cyanosis, or edema. NEUROLOGIC: No focal deficits. ECOG PS 1    Impression/Plan:     The patient is a very pleasant 56-year-old lady,  with a PMH significant for hyperlipedemia, obesity, OA, I met the patient initially in 2016 after diagnosis with PE,she underwent on 1/29/2016 a left total hip arthroplasty, the patient was on Xarelto for about 2 weeks following her surgery. On 3/8/2016 the patient started having chest pain, shortness of breath, palpitations and lightheadedness, she was seen at the ED on 3/9/2916, had a CTA chest done revealing Extensive filling defects involve the distal central right and left pulmonary arteries, in addition to the bilateral upper and lower lobe and right middle lobe pulmonary arteries. Findings are compatible with pulmonary emboli. She had b/l LE venous ultrasounds done, were negative for DVTs. The patient had a provoked event by a transient surgical risk factor, given the fact that she had extensive bilateral PEs, recommended anticoagulation with Xarelto for a year, repeat chest CT was done revealing complete resolution of the PEs, Xarelto was discontinued on 3/22/2017. She was started on baby aspirin. No recurrent DVTs. She continues to be on baby aspirin. The patient has mild leukopenia, without lymphocytopenia or neutropenia, this is likely reactive, ordered a work-up to evaluate for chronic viral infections, autoimmune etiology, and nutritional deficiencies, the patient will have JOHNNA done, hepatitis and HIV testing, vitamin B12, folate, and peripheral blood flow cytometry.     Microcytosis, the patient is not anemic, this could be related to the blood donation, will order iron studies, and check FIT test, she is up-to-date with colonoscopies. I discussed with Cheryl Botello the importance of weight loss. RTC in about 2-3 weeks. Thank you for allowing us to participate in the care of Mrs. Wallace.     Ericka Moulton MD   HEMATOLOGY/MEDICAL [facility]  [address] [address]  Dept: [facility]: [phone]

## 2021-03-03 NOTE — PROGRESS NOTES
[address] [facility], [address]  1955 63 y.o. Referring Physician:     PCP: Mali Patel,     Vitals:    21 1024   BP: (!) 165/86   Pulse: 68   Resp: 18   Temp: 98 °F (36.7 °C)   SpO2: 97%        Wt Readings from Last 3 Encounters:   21 (!) 325 lb 8 oz (147.6 kg)   20 (!) 305 lb (138.3 kg)   20 (!) 305 lb (138.3 kg)        Body mass index is 52.54 kg/m². Chief Complaint: No chief complaint on file. Cancer Staging  No matching staging information was found for the patient. Prior Radiation Therapy? NO    Concurrent Chemo/radiation? NO    Prior Chemotherapy? NO    Prior Hormonal Therapy? NO    Head and Neck Cancer? No, patient does NOT have HN cancer. LMP: menopause    Age at first Menses: 8    : 0    Para: 0          Current Outpatient Medications:     metFORMIN (GLUCOPHAGE-XR) 500 MG extended release tablet, Take 500 mg by mouth Daily with supper, Disp: , Rfl:     metoprolol tartrate (LOPRESSOR) 25 MG tablet, Take 1 tablet by mouth 2 times daily, Disp: 60 tablet, Rfl: 9    aspirin 81 MG EC tablet, Take 1 tablet by mouth daily, Disp: , Rfl:     nitroGLYCERIN (NITROSTAT) 0.4 MG SL tablet, up to max of 3 total doses.  If no relief after 1 dose, call 911., Disp: 25 tablet, Rfl: 1    lisinopril (PRINIVIL;ZESTRIL) 20 MG tablet, Take 0.5 tablets by mouth daily, Disp: 30 tablet, Rfl: 3    naproxen sodium (ALEVE) 220 MG tablet, Take 220 mg by mouth 2 times daily as needed for Pain, Disp: , Rfl:     atorvastatin (LIPITOR) 20 MG tablet, Take 20 mg by mouth nightly, Disp: , Rfl:     omeprazole (PRILOSEC) 20 MG delayed release capsule, Take 20 mg by mouth Daily, Disp: , Rfl:     acetaminophen (TYLENOL) 500 MG tablet, Take 500 mg by mouth every 6 hours as needed for Pain, Disp: , Rfl:     Multiple Vitamins-Calcium (ONE-A-DAY WOMENS PO), Take 1 tablet by mouth daily, Disp: , Rfl:     Cholecalciferol 4000 UNITS CAPS, Take 1 capsule by mouth daily, Disp: , Rfl:    methylPREDNISolone (MEDROL, CHAITANYA,) 4 MG tablet, Take as directed., Disp: 1 kit, Rfl: 0       Past Medical History:   Diagnosis Date    Anticoagulant long-term use 03/2016    Xarelto    Asthma     H/O cardiovascular stress test 03/04/2020    Hemorrhoids     Hiatal hernia     Hx of blood clots 03/2016    pulmonary embolism    Hyperlipidemia     Hypertension     Obesity     morbid    Osteoarthritis     Postoperative surgical complication involving digestive system     Wears dentures     permanent upper bridge front right tooth, next 2 on right       Past Surgical History:   Procedure Laterality Date    APPENDECTOMY      CARPAL TUNNEL RELEASE      left    COLONOSCOPY  10/26/2016    Dr. Irasema Potter, Findings: Normal Colon to the cecum - repeat in 10 years    CYST REMOVAL      EYE SURGERY Left 2016    retina tear    GASTRIC BAND  2/27/2007    Lap; 10 cm band; with hiatal hernia repair    JOINT REPLACEMENT Left 1/29/2016    total hip    JOINT REPLACEMENT Bilateral     knees    OTHER SURGICAL HISTORY  12/05/2016    laparscopic gastric band removal and hiatal hernia repair    OVARY REMOVAL Right     right due to cyst    TOTAL KNEE ARTHROPLASTY Bilateral 2012    UPPER GASTROINTESTINAL ENDOSCOPY  2007    UPPER GASTROINTESTINAL ENDOSCOPY  10/26/2016    Dr. Irasema Potter, Findings: GE Reflux, 2.5cm Hiatal Hernia with the slipped stomach up in the chest       Family History   Problem Relation Age of Onset    Cancer Father 39        bladder    No Known Problems Mother     No Known Problems Sister     No Known Problems Brother     No Known Problems Maternal Aunt     No Known Problems Maternal Uncle     Cancer Paternal Aunt         lung    No Known Problems Paternal Uncle     No Known Problems Maternal Grandmother     No Known Problems Maternal Grandfather     No Known Problems Paternal Grandmother     No Known Problems Paternal Grandfather     Mult Sclerosis Paternal Cousin        Social History Socioeconomic History    Marital status: Single     Spouse name: Not on file    Number of children: Not on file    Years of education: Not on file    Highest education level: Not on file   Occupational History    Not on file   Social Needs    Financial resource strain: Not on file    Food insecurity     Worry: Not on file     Inability: Not on file    Transportation needs     Medical: Not on file     Non-medical: Not on file   Tobacco Use    Smoking status: Never Smoker    Smokeless tobacco: Never Used   Substance and Sexual Activity    Alcohol use: No     Comment: 1 to 2 times per year. Hot tea andice tea 1 cup a day.  Drug use: No    Sexual activity: Never   Lifestyle    Physical activity     Days per week: Not on file     Minutes per session: Not on file    Stress: Not on file   Relationships    Social connections     Talks on phone: Not on file     Gets together: Not on file     Attends Pentecostalism service: Not on file     Active member of club or organization: Not on file     Attends meetings of clubs or organizations: Not on file     Relationship status: Not on file    Intimate partner violence     Fear of current or ex partner: Not on file     Emotionally abused: Not on file     Physically abused: Not on file     Forced sexual activity: Not on file   Other Topics Concern    Not on file   Social History Narrative    Not on file           Occupation: Secretarial work  Retired:  NO          REVIEW OF SYSTEMS:     Pacemaker/Defibulator/ICD:  NO    Mediport: No           FALLS RISK SCREENING ASSESSMENT    Instructions:  Assess the patient and [place] the appropriate indicators that are present for fall risk identification. Total the numbers circled and assign a fall risk score from Table 2.  Reassess patient at a minimum every 12 weeks or with status change. Assessment   Date  3/3/2021     1. Mental Ability: confusion/cognitively impaired No - 0       2.   Elimination Issues: incontinence, frequency No - 0       3. Ambulatory: use of assistive devices (walker, cane, off-loading devices), attached to equipment (IV pole, oxygen) No - 0     4. Sensory Limitations: dizziness, vertigo, impaired vision No - 0       5. Age Less than 65 years - 0       6. Medication: diuretics, strong analgesics, hypnotics, sedatives, antihypertensive agents   Yes - 3   7. Falls:  recent history of falls within the last 3 months (not to include slipping or tripping)   No - 0   TOTAL 3    If score of 4 or greater was education given? No       TABLE 2   Risk Score Risk Level Plan of Care   0-3 Little or  No Risk 1. Provide assistance as indicated for ambulation activities  2. Reorient confused/cognitively impaired patient  3. Call-light/bell within patient's reach  4. Chair/bed in low position, stretcher/bed with siderails up except when performing patient care activities  5. Educate patient/family/caregiver on falls prevention  6.  Reassess in 12 weeks or with any noted change in patient condition which places them at a risk for a fall   4-6 Moderate Risk 1. Provide assistance as indicated for ambulation activities  2. Reorient confused/cognitively impaired patient  3. Call-light/bell within patient's reach  4. Chair/bed in low position, stretcher/bed with siderails up except when performing patient care activities  5. Educate patient/family/caregiver on falls prevention  6. Falls risk precaution (Yellow sticker Level II) placed on patient chart   7 or   Higher High Risk 1. Place patient in easily observable treatment room  2. Patient attended at all times by family member or staff  3. Provide assistance as indicated for ambulation activities  4. Reorient confused/cognitively impaired patient  5. Call-light/bell within patient's reach  6. Chair/bed in low position, stretcher/bed with siderails up except when performing patient care activities  7. Educate patient/family/caregiver on falls prevention  8. Falls risk precaution (Yellow sticker Level III) placed on patient chart           MALNUTRITION RISK SCREENING ASSESSMENT    Instructions:  Assess the patient and enter the appropriate indicators that are present for nutrition risk identification. Total the numbers entered and assign a risk score. Follow the appropriate action for total score listed below. Assessment   Date  3/3/2021     1. Have you lost weight without trying? 0- No     2. Have you been eating poorly because of a decreased appetite? 0- No   3. Do you have a diagnosis of head and neck cancer?       0- No                                                                                    TOTAL 0        Score of 0-1: No action  Score 2 or greater:  · For Non-Diabetic Patient: Recommend adding Ensure Enlive 2 x daily and provide patient with Ensure wellness bag with coupons  · For Diabetic Patient: Recommend adding Glucerna Shake 2 x daily and provide patient with Glucerna Wellness bag with coupons  · Route to the dietitian via Camilla Garzon RN

## 2021-03-04 LAB
ANTI-NUCLEAR ANTIBODY (ANA): NEGATIVE
HAV IGM SER IA-ACNC: NORMAL
HEPATITIS B CORE IGM ANTIBODY: NORMAL
HEPATITIS B SURFACE ANTIGEN INTERPRETATION: NORMAL
HEPATITIS C ANTIBODY INTERPRETATION: NORMAL
HIV-1 AND HIV-2 ANTIBODIES: NORMAL
PATHOLOGIST REVIEW: NORMAL

## 2021-03-05 DIAGNOSIS — D72.819 LEUKOPENIA, UNSPECIFIED TYPE: ICD-10-CM

## 2021-03-05 DIAGNOSIS — D72.819 LEUKOPENIA, UNSPECIFIED TYPE: Primary | ICD-10-CM

## 2021-03-05 LAB
CONTROL: NORMAL
HEMOCCULT STL QL: NORMAL

## 2021-03-08 LAB
Lab: NORMAL
REPORT: NORMAL
THIS TEST SENT TO: NORMAL

## 2021-03-17 ENCOUNTER — HOSPITAL ENCOUNTER (OUTPATIENT)
Dept: INFUSION THERAPY | Age: 66
Discharge: HOME | End: 2021-03-17
Payer: COMMERCIAL

## 2021-03-17 DIAGNOSIS — R71.8 MICROCYTOSIS: ICD-10-CM

## 2021-03-17 DIAGNOSIS — D72.819 LEUKOPENIA, UNSPECIFIED TYPE: ICD-10-CM

## 2021-03-17 DIAGNOSIS — D72.819 LEUKOPENIA, UNSPECIFIED TYPE: Primary | ICD-10-CM

## 2021-03-17 DIAGNOSIS — I26.99 OTHER ACUTE PULMONARY EMBOLISM, UNSPECIFIED WHETHER ACUTE COR PULMONALE PRESENT (HCC): Primary | ICD-10-CM

## 2021-03-17 DIAGNOSIS — I26.99 OTHER ACUTE PULMONARY EMBOLISM, UNSPECIFIED WHETHER ACUTE COR PULMONALE PRESENT (HCC): ICD-10-CM

## 2021-03-17 LAB
ALBUMIN SERPL-MCNC: 3.9 G/DL (ref 3.5–5.2)
ALP BLD-CCNC: 72 U/L (ref 35–104)
ALT SERPL-CCNC: 17 U/L (ref 0–32)
ANION GAP SERPL CALCULATED.3IONS-SCNC: 11 MMOL/L (ref 7–16)
AST SERPL-CCNC: 20 U/L (ref 0–31)
BASOPHILS ABSOLUTE: 0.05 E9/L (ref 0–0.2)
BASOPHILS RELATIVE PERCENT: 1 % (ref 0–2)
BILIRUB SERPL-MCNC: 0.4 MG/DL (ref 0–1.2)
BUN BLDV-MCNC: 21 MG/DL (ref 8–23)
CALCIUM SERPL-MCNC: 9 MG/DL (ref 8.6–10.2)
CHLORIDE BLD-SCNC: 105 MMOL/L (ref 98–107)
CO2: 25 MMOL/L (ref 22–29)
CREAT SERPL-MCNC: 0.9 MG/DL (ref 0.5–1)
EOSINOPHILS ABSOLUTE: 0.15 E9/L (ref 0.05–0.5)
EOSINOPHILS RELATIVE PERCENT: 3 % (ref 0–6)
FERRITIN: 18 NG/ML
GFR AFRICAN AMERICAN: >60
GFR NON-AFRICAN AMERICAN: >60 ML/MIN/1.73
GLUCOSE BLD-MCNC: 152 MG/DL (ref 74–99)
HCT VFR BLD CALC: 37.3 % (ref 34–48)
HEMOGLOBIN: 11.2 G/DL (ref 11.5–15.5)
IMMATURE GRANULOCYTES #: 0.01 E9/L
IMMATURE GRANULOCYTES %: 0.2 % (ref 0–5)
IRON SATURATION: 17 % (ref 15–50)
IRON: 65 MCG/DL (ref 37–145)
LYMPHOCYTES ABSOLUTE: 1.25 E9/L (ref 1.5–4)
LYMPHOCYTES RELATIVE PERCENT: 25.4 % (ref 20–42)
MCH RBC QN AUTO: 23.8 PG (ref 26–35)
MCHC RBC AUTO-ENTMCNC: 30 % (ref 32–34.5)
MCV RBC AUTO: 79.2 FL (ref 80–99.9)
MONOCYTES ABSOLUTE: 0.39 E9/L (ref 0.1–0.95)
MONOCYTES RELATIVE PERCENT: 7.9 % (ref 2–12)
NEUTROPHILS ABSOLUTE: 3.08 E9/L (ref 1.8–7.3)
NEUTROPHILS RELATIVE PERCENT: 62.5 % (ref 43–80)
PDW BLD-RTO: 14.1 FL (ref 11.5–15)
PLATELET # BLD: 250 E9/L (ref 130–450)
PMV BLD AUTO: 9.9 FL (ref 7–12)
POTASSIUM SERPL-SCNC: 4.5 MMOL/L (ref 3.5–5)
RBC # BLD: 4.71 E12/L (ref 3.5–5.5)
SODIUM BLD-SCNC: 141 MMOL/L (ref 132–146)
TOTAL IRON BINDING CAPACITY: 379 MCG/DL (ref 250–450)
TOTAL PROTEIN: 6.4 G/DL (ref 6.4–8.3)
WBC # BLD: 4.9 E9/L (ref 4.5–11.5)

## 2021-03-17 PROCEDURE — 85025 COMPLETE CBC W/AUTO DIFF WBC: CPT

## 2021-03-17 PROCEDURE — 83540 ASSAY OF IRON: CPT

## 2021-03-17 PROCEDURE — 80053 COMPREHEN METABOLIC PANEL: CPT

## 2021-03-17 PROCEDURE — 82728 ASSAY OF FERRITIN: CPT

## 2021-03-17 PROCEDURE — 83550 IRON BINDING TEST: CPT

## 2021-03-17 PROCEDURE — 36415 COLL VENOUS BLD VENIPUNCTURE: CPT

## 2021-03-18 ENCOUNTER — OFFICE VISIT (OUTPATIENT)
Dept: ONCOLOGY | Age: 66
End: 2021-03-18
Payer: COMMERCIAL

## 2021-03-18 VITALS
DIASTOLIC BLOOD PRESSURE: 77 MMHG | WEIGHT: 293 LBS | SYSTOLIC BLOOD PRESSURE: 144 MMHG | HEART RATE: 85 BPM | TEMPERATURE: 97.7 F | BODY MASS INDEX: 47.09 KG/M2 | OXYGEN SATURATION: 98 % | HEIGHT: 66 IN

## 2021-03-18 DIAGNOSIS — R71.8 MICROCYTOSIS: Primary | ICD-10-CM

## 2021-03-18 PROCEDURE — 99212 OFFICE O/P EST SF 10 MIN: CPT

## 2021-03-18 PROCEDURE — 99214 OFFICE O/P EST MOD 30 MIN: CPT | Performed by: INTERNAL MEDICINE

## 2021-03-18 RX ORDER — FERROUS SULFATE 325(65) MG
325 TABLET ORAL
Qty: 60 TABLET | Refills: 1 | Status: SHIPPED | OUTPATIENT
Start: 2021-03-18

## 2021-03-18 NOTE — PROGRESS NOTES
[address]  Dept: [phone]  Loc: [phone]  Attending progress note      Reason for Visit:   Leukopenia and microcytosis. Referring Physician: Braxton Crump CNP    PCP:  Omayra Ren DO    History of Present Illness: The patient is a very pleasant 73-year-old lady,  with a PMH significant for hyperlipedemia, obesity, OA, I met the patient initially in 2016 after diagnosis with PE,she underwent on 1/29/2016 a left total hip arthroplasty, the patient was on Xarelto for about 2 weeks following her surgery. On 3/8/2016 the patient started having chest pain, shortness of breath, palpitations and lightheadedness, she was seen at the ED on 3/9/2916, had a CTA chest done revealing Extensive filling defects involve the distal central right and left pulmonary arteries, in addition to the bilateral upper and lower lobe and right middle lobe pulmonary arteries. Findings are compatible with pulmonary emboli. She had b/l LE venous ultrasounds done, were negative for DVTs. The patient was on Xarelto for 1 year, it was discontinued on 3/22/2017. No recurrent DVTs. She continues to be on baby aspirin. The patient has a history of intermittent leukopenia, her CBCD from 2/4/2021 was remarkable for a white count of 4.3, ANC 2130, hemoglobin 11.6, hematocrit 37.1, MCV 78.1, platelets 483D. The patient denies any fever, no recurrent infections, no unintentional weight loss. The patient donates blood about once every 8 weeks. Review of Systems;  CONSTITUTIONAL: No fever, chills. Good appetite, improved energy level. ENMT: Eyes: No diplopia; Nose: No epistaxis. Mouth: No sore throat. RESPIRATORY: No hemoptysis, positive for shortness of breath on exertion, chest CTA was negative for PE, no cough. CARDIOVASCULAR: No chest pain, palpitations. GASTROINTESTINAL: No nausea/vomiting, abdominal pain, diarrhea/constipation.   GENITOURINARY: No dysuria, urinary frequency, hematuria. NEURO: No syncope, presyncope, headache.   Remainder:  ROS NEGATIVE    Past Medical History:      Diagnosis Date    Anticoagulant long-term use 03/2016    Xarelto    Asthma     H/O cardiovascular stress test 03/04/2020    Hemorrhoids     Hiatal hernia     Hx of blood clots 03/2016    pulmonary embolism    Hyperlipidemia     Hypertension     Obesity     morbid    Osteoarthritis     Postoperative surgical complication involving digestive system     Wears dentures     permanent upper bridge front right tooth, next 2 on right     Patient Active Problem List   Diagnosis    Primary osteoarthritis of left hip    Acute pulmonary embolism ([address]    Hyperlipidemia    Obesity    Family hx of colon cancer requiring screening colonoscopy    IZQUIERDO (dyspnea on exertion)        Past Surgical History:      Procedure Laterality Date    APPENDECTOMY      CARPAL TUNNEL RELEASE      left    COLONOSCOPY  10/26/2016    Dr. Chuck Cruz, Findings: Normal Colon to the cecum - repeat in 10 years    CYST REMOVAL      EYE SURGERY Left 2016    retina tear    GASTRIC BAND  2/27/2007    Lap; 10 cm band; with hiatal hernia repair    JOINT REPLACEMENT Left 1/29/2016    total hip    JOINT REPLACEMENT Bilateral     knees    OTHER SURGICAL HISTORY  12/05/2016    laparscopic gastric band removal and hiatal hernia repair    OVARY REMOVAL Right     right due to cyst    TOTAL KNEE ARTHROPLASTY Bilateral 2012    UPPER GASTROINTESTINAL ENDOSCOPY  2007    UPPER GASTROINTESTINAL ENDOSCOPY  10/26/2016    Dr. Chuck Cruz, Findings: GE Reflux, 2.5cm Hiatal Hernia with the slipped stomach up in the chest       Family History:  Family History   Problem Relation Age of Onset    Cancer Father 39        bladder    No Known Problems Mother     No Known Problems Sister     No Known Problems Brother     No Known Problems Maternal Aunt     No Known Problems Maternal Uncle     Cancer Paternal Aunt lung    No Known Problems Paternal Uncle     No Known Problems Maternal Grandmother     No Known Problems Maternal Grandfather     No Known Problems Paternal Grandmother     No Known Problems Paternal Grandfather     Mult Sclerosis Paternal Cousin        Medications:  Reviewed and reconciled. Social History:  Social History     Socioeconomic History    Marital status: Single     Spouse name: Not on file    Number of children: Not on file    Years of education: Not on file    Highest education level: Not on file   Occupational History    Not on file   Social Needs    Financial resource strain: Not on file    Food insecurity     Worry: Not on file     Inability: Not on file    Transportation needs     Medical: Not on file     Non-medical: Not on file   Tobacco Use    Smoking status: Never Smoker    Smokeless tobacco: Never Used   Substance and Sexual Activity    Alcohol use: No     Comment: 1 to 2 times per year. Hot tea andice tea 1 cup a day.     Drug use: No    Sexual activity: Never   Lifestyle    Physical activity     Days per week: Not on file     Minutes per session: Not on file    Stress: Not on file   Relationships    Social connections     Talks on phone: Not on file     Gets together: Not on file     Attends Bahai service: Not on file     Active member of club or organization: Not on file     Attends meetings of clubs or organizations: Not on file     Relationship status: Not on file    Intimate partner violence     Fear of current or ex partner: Not on file     Emotionally abused: Not on file     Physically abused: Not on file     Forced sexual activity: Not on file   Other Topics Concern    Not on file   Social History Narrative    Not on file       Allergies:  No Known Allergies    Physical Exam:  BP (!) 144/77 (Site: Left Upper Arm, Position: Sitting, Cuff Size: Large Adult)   Pulse 85   Temp 97.7 °F (36.5 °C) (Temporal)   Ht 5' 6\" (1.676 m)   Wt (!) 325 lb 9.6 oz (147.7 kg)   SpO2 98%   BMI 52.55 kg/m²   GENERAL: Alert, oriented x 3, not in acute distress. HEENT: PERRLA; EOMI. Oropharynx clear. NECK: Supple. No palpable cervical or supraclavicular lymphadenopathy. LUNGS: Good air entry bilaterally. No wheezing, crackles or rhonchi. CARDIOVASCULAR: Regular rate. No murmurs, rubs or gallops. ABDOMEN: Soft. Non-tender, non-distended. Positive bowel sounds. EXTREMITIES: Without clubbing, cyanosis, or edema. NEUROLOGIC: No focal deficits. ECOG PS 1    Impression/Plan:     The patient is a very pleasant 28-year-old lady,  with a PMH significant for hyperlipedemia, obesity, OA, I met the patient initially in 2016 after diagnosis with PE,she underwent on 1/29/2016 a left total hip arthroplasty, the patient was on Xarelto for about 2 weeks following her surgery. On 3/8/2016 the patient started having chest pain, shortness of breath, palpitations and lightheadedness, she was seen at the ED on 3/9/2916, had a CTA chest done revealing Extensive filling defects involve the distal central right and left pulmonary arteries, in addition to the bilateral upper and lower lobe and right middle lobe pulmonary arteries. Findings are compatible with pulmonary emboli. She had b/l LE venous ultrasounds done, were negative for DVTs. The patient had a provoked event by a transient surgical risk factor, given the fact that she had extensive bilateral PEs, recommended anticoagulation with Xarelto for a year, repeat chest CT was done revealing complete resolution of the PEs, Xarelto was discontinued on 3/22/2017. She was started on baby aspirin. No recurrent DVTs. She continues to be on baby aspirin.     The patient had mild leukopenia, without lymphocytopenia or neutropenia, most likely was reactive,,ordered a work-up to evaluate for chronic viral infections, autoimmune etiology, and nutritional deficiencies, JOHNNA is negative, no hepatitis or HIV infections, no vitamin B12 or folate deficiency, peripheral blood flow cytometry is unremarkable. Her white count had normalized, most likely it was reactive. Mild microcytic anemia, fit test is negative, she is up-to-date with colonoscopies. Most likely this is secondary to blood donation, the patient was started on oral iron once daily, the side effects were reviewed with the patient, we will monitor her CBCD and iron studies. I discussed with Bret the importance of weight loss. RTC in about 2 months. Thank you for allowing us to participate in the care of Mrs. Wallace.     Madeline Arauz MD   HEMATOLOGY/MEDICAL [facility]  [address] [address]  Dept: [facility]: [phone]

## 2021-05-14 ENCOUNTER — HOSPITAL ENCOUNTER (OUTPATIENT)
Age: 66
Discharge: HOME | End: 2021-05-14
Payer: COMMERCIAL

## 2021-05-14 DIAGNOSIS — R71.8 MICROCYTOSIS: ICD-10-CM

## 2021-05-14 LAB
BASOPHILS ABSOLUTE: 0.03 E9/L (ref 0–0.2)
BASOPHILS RELATIVE PERCENT: 0.7 % (ref 0–2)
EOSINOPHILS ABSOLUTE: 0.23 E9/L (ref 0.05–0.5)
EOSINOPHILS RELATIVE PERCENT: 5.4 % (ref 0–6)
FERRITIN: 19 NG/ML
HCT VFR BLD CALC: 41.1 % (ref 34–48)
HEMOGLOBIN: 13 G/DL (ref 11.5–15.5)
IMMATURE GRANULOCYTES #: 0.01 E9/L
IMMATURE GRANULOCYTES %: 0.2 % (ref 0–5)
IRON SATURATION: 14 % (ref 15–50)
IRON: 51 MCG/DL (ref 37–145)
LYMPHOCYTES ABSOLUTE: 1.2 E9/L (ref 1.5–4)
LYMPHOCYTES RELATIVE PERCENT: 28 % (ref 20–42)
MCH RBC QN AUTO: 25.4 PG (ref 26–35)
MCHC RBC AUTO-ENTMCNC: 31.6 % (ref 32–34.5)
MCV RBC AUTO: 80.4 FL (ref 80–99.9)
MONOCYTES ABSOLUTE: 0.41 E9/L (ref 0.1–0.95)
MONOCYTES RELATIVE PERCENT: 9.6 % (ref 2–12)
NEUTROPHILS ABSOLUTE: 2.4 E9/L (ref 1.8–7.3)
NEUTROPHILS RELATIVE PERCENT: 56.1 % (ref 43–80)
PDW BLD-RTO: 16 FL (ref 11.5–15)
PLATELET # BLD: 223 E9/L (ref 130–450)
PMV BLD AUTO: 9.4 FL (ref 7–12)
RBC # BLD: 5.11 E12/L (ref 3.5–5.5)
TOTAL IRON BINDING CAPACITY: 362 MCG/DL (ref 250–450)
WBC # BLD: 4.3 E9/L (ref 4.5–11.5)

## 2021-05-14 PROCEDURE — 82728 ASSAY OF FERRITIN: CPT

## 2021-05-14 PROCEDURE — 36415 COLL VENOUS BLD VENIPUNCTURE: CPT

## 2021-05-14 PROCEDURE — 83540 ASSAY OF IRON: CPT

## 2021-05-14 PROCEDURE — 85025 COMPLETE CBC W/AUTO DIFF WBC: CPT

## 2021-05-14 PROCEDURE — 83550 IRON BINDING TEST: CPT

## 2021-05-20 ENCOUNTER — OFFICE VISIT (OUTPATIENT)
Dept: ONCOLOGY | Age: 66
End: 2021-05-20
Payer: COMMERCIAL

## 2021-05-20 VITALS
OXYGEN SATURATION: 95 % | SYSTOLIC BLOOD PRESSURE: 188 MMHG | BODY MASS INDEX: 47.09 KG/M2 | HEART RATE: 73 BPM | TEMPERATURE: 98.2 F | RESPIRATION RATE: 18 BRPM | DIASTOLIC BLOOD PRESSURE: 98 MMHG | WEIGHT: 293 LBS | HEIGHT: 66 IN

## 2021-05-20 DIAGNOSIS — I26.99 OTHER ACUTE PULMONARY EMBOLISM, UNSPECIFIED WHETHER ACUTE COR PULMONALE PRESENT (HCC): Primary | ICD-10-CM

## 2021-05-20 DIAGNOSIS — D72.819 LEUKOPENIA, UNSPECIFIED TYPE: ICD-10-CM

## 2021-05-20 DIAGNOSIS — R71.8 MICROCYTOSIS: ICD-10-CM

## 2021-05-20 PROCEDURE — 99212 OFFICE O/P EST SF 10 MIN: CPT | Performed by: INTERNAL MEDICINE

## 2021-05-20 PROCEDURE — 99213 OFFICE O/P EST LOW 20 MIN: CPT | Performed by: INTERNAL MEDICINE

## 2021-05-20 NOTE — PROGRESS NOTES
[address]  Dept: [phone]  Loc: [phone]  Attending progress note      Reason for Visit:   Leukopenia and microcytosis. Referring Physician: Kelle Terrell CNP    PCP:  Reed Daley DO    History of Present Illness: The patient is a very pleasant 61-year-old lady,  with a PMH significant for hyperlipedemia, obesity, OA, I met the patient initially in 2016 after diagnosis with PE,she underwent on 1/29/2016 a left total hip arthroplasty, the patient was on Xarelto for about 2 weeks following her surgery. On 3/8/2016 the patient started having chest pain, shortness of breath, palpitations and lightheadedness, she was seen at the ED on 3/9/2916, had a CTA chest done revealing Extensive filling defects involve the distal central right and left pulmonary arteries, in addition to the bilateral upper and lower lobe and right middle lobe pulmonary arteries. Findings are compatible with pulmonary emboli. She had b/l LE venous ultrasounds done, were negative for DVTs. The patient was on Xarelto for 1 year, it was discontinued on 3/22/2017. No recurrent DVTs. She continues to be on baby aspirin. The patient has a history of intermittent leukopenia, her CBCD from 2/4/2021 was remarkable for a white count of 4.3, ANC 2130, hemoglobin 11.6, hematocrit 37.1, MCV 78.1, platelets 452F. The patient was donating blood about once every 8 weeks. The patient is tolerating the oral iron well overall, sometimes she has excess gas. She is feeling tired. Review of Systems;  CONSTITUTIONAL: No fever, chills. Good appetite, positive for fatigue. ENMT: Eyes: No diplopia; Nose: No epistaxis. Mouth: No sore throat. RESPIRATORY: No hemoptysis, positive for shortness of breath on exertion, chest CTA was negative for PE, no cough. CARDIOVASCULAR: No chest pain, palpitations.   GASTROINTESTINAL: No nausea/vomiting, abdominal pain, diarrhea/constipation. GENITOURINARY: No dysuria, urinary frequency, hematuria. NEURO: No syncope, presyncope, headache.   Remainder:  ROS NEGATIVE    Past Medical History:      Diagnosis Date    Anticoagulant long-term use 03/2016    Xarelto    Asthma     H/O cardiovascular stress test 03/04/2020    Hemorrhoids     Hiatal hernia     Hx of blood clots 03/2016    pulmonary embolism    Hyperlipidemia     Hypertension     Obesity     morbid    Osteoarthritis     Postoperative surgical complication involving digestive system     Wears dentures     permanent upper bridge front right tooth, next 2 on right     Patient Active Problem List   Diagnosis    Primary osteoarthritis of left hip    Acute pulmonary embolism (HCC)    Hyperlipidemia    Obesity    Family hx of colon cancer requiring screening colonoscopy    IQZUIERDO (dyspnea on exertion)        Past Surgical History:      Procedure Laterality Date    APPENDECTOMY      CARPAL TUNNEL RELEASE      left    COLONOSCOPY  10/26/2016    Dr. Pantera Freedman, Findings: Normal Colon to the cecum - repeat in 10 years    CYST REMOVAL      EYE SURGERY Left 2016    retina tear    GASTRIC BAND  2/27/2007    Lap; 10 cm band; with hiatal hernia repair    JOINT REPLACEMENT Left 1/29/2016    total hip    JOINT REPLACEMENT Bilateral     knees    OTHER SURGICAL HISTORY  12/05/2016    laparscopic gastric band removal and hiatal hernia repair    OVARY REMOVAL Right     right due to cyst    TOTAL KNEE ARTHROPLASTY Bilateral 2012    UPPER GASTROINTESTINAL ENDOSCOPY  2007    UPPER GASTROINTESTINAL ENDOSCOPY  10/26/2016    Dr. Pantera Freedman, Findings: GE Reflux, 2.5cm Hiatal Hernia with the slipped stomach up in the chest       Family History:  Family History   Problem Relation Age of Onset    Cancer Father 39        bladder    No Known Problems Mother     No Known Problems Sister     No Known Problems Brother     No Known Problems Maternal Aunt     No Known Problems Maternal Uncle     Cancer Paternal Aunt         lung    No Known Problems Paternal Uncle     No Known Problems Maternal Grandmother     No Known Problems Maternal Grandfather     No Known Problems Paternal Grandmother     No Known Problems Paternal Grandfather     Mult Sclerosis Paternal Cousin        Medications:  Reviewed and reconciled. Social History:  Social History     Socioeconomic History    Marital status: Single     Spouse name: Not on file    Number of children: Not on file    Years of education: Not on file    Highest education level: Not on file   Occupational History    Not on file   Tobacco Use    Smoking status: Never Smoker    Smokeless tobacco: Never Used   Vaping Use    Vaping Use: Never used   Substance and Sexual Activity    Alcohol use: No     Comment: 1 to 2 times per year. Hot tea andice tea 1 cup a day.  Drug use: No    Sexual activity: Never   Other Topics Concern    Not on file   Social History Narrative    Not on file     Social Determinants of Health     Financial Resource Strain:     Difficulty of Paying Living Expenses:    Food Insecurity:     Worried About Running Out of Food in the Last Year:     [address] in the Last Year:    Transportation Needs:     Lack of Transportation (Medical):  Lack of Transportation (Non-Medical):    Physical Activity:     Days of Exercise per Week:     Minutes of Exercise per Session:    Stress:     Feeling of Stress :    Social Connections:     Frequency of Communication with Friends and Family:     Frequency of Social Gatherings with Friends and Family:     Attends Protestant Services:     Active Member of Clubs or Organizations:     Attends Club or Organization Meetings:     Marital Status:    Intimate Partner Violence:     Fear of Current or Ex-Partner:     Emotionally Abused:     Physically Abused:     Sexually Abused:         Allergies:  No Known Allergies    Physical Exam:  BP (!) 188/98   Pulse 73 deficiencies, JOHNNA is negative, no hepatitis or HIV infections, no vitamin B12 or folate deficiency, peripheral blood flow cytometry is unremarkable. Her white count is 4.3, ALC 1200, will continue monitor her CBCD. Mild microcytic anemia, fit test is negative, she is up-to-date with colonoscopies. Most likely this is secondary to blood donation, the patient was started on oral iron once daily, her hemoglobin/hematocrit had normalized, hemoglobin is 13, hematocrit 41.1, she still has mild iron deficiency, continue oral iron. I discussed with Allyson Segura the importance of weight loss. RTC in about 3 months    Thank you for allowing us to participate in the care of Mrs. Wallace.     Mirella Morales MD   HEMATOLOGY/MEDICAL [facility]  [address] [address]  Dept: [place]: [phone]

## 2021-06-03 ENCOUNTER — NURSE TRIAGE (OUTPATIENT)
Dept: OTHER | Facility: CLINIC | Age: 66
End: 2021-06-03

## 2021-06-03 NOTE — TELEPHONE ENCOUNTER
See previous not patient already triaged and confirming she needs to go to ER. Instructed to go to ER already triaged.      Reason for Disposition   Caller has already spoken with another triager and has no further questions    Protocols used: NO CONTACT OR DUPLICATE CONTACT CALL-ADULT-[place]

## 2021-06-03 NOTE — TELEPHONE ENCOUNTER
Reason for Disposition   SEVERE vomiting (e.g., 6 or more times/day) (Exception: patient sounds well, is drinking liquids, does not sound dehydrated, and vomiting has lasted less than 24 hours)    Answer Assessment - Initial Assessment Questions  1. VOMITING SEVERITY: \"How many times have you vomited in the past 24 hours? \"      - MILD:  1 - 2 times/day     - MODERATE: 3 - 5 times/day, decreased oral intake without significant weight loss or symptoms of dehydration     - SEVERE: 6 or more times/day, vomits everything or nearly everything, with significant weight loss, symptoms of dehydration       Every 15-20 min today severe    2. ONSET: \"When did the vomiting begin? \"       5 am this am    3. FLUIDS: \"What fluids or food have you vomited up today? \" \"Have you been able to keep any fluids down? \"      Vomits everything    4. ABDOMINAL PAIN: Fernando Mart your having any abdominal pain? \" If yes : \"How bad is it and what does it feel like? \" (e.g., crampy, dull, intermittent, constant)       Denies    5. DIARRHEA: \"Is there any diarrhea? \" If so, ask: \"How many times today? \"       Denies    6. CONTACTS: \"Is there anyone else in the family with the same symptoms? \"       Denies    7. CAUSE: \"What do you think is causing your vomiting? \"      Unsure    8. HYDRATION STATUS: \"Any signs of dehydration? \" (e.g., dry mouth [not only dry lips], too weak to stand) \"When did you last urinate? \"      Fatigue    9. OTHER SYMPTOMS: \"Do you have any other symptoms? \" (e.g., fever, headache, vertigo, vomiting blood or coffee grounds, recent head injury)      Denies    10. PREGNANCY: \"Is there any chance you are pregnant? \" \"When was your last menstrual period? \"        n/a    Protocols used: VOMITING-ADULT-[place]     Brief description of triage: as above vomiting started at 5am today ststes sudden onset vomiting every 15-20 min feeling tired no fever she is aware of, she is out of town in [facility] at this time    Triage indicates for patient to to

## 2021-08-17 ENCOUNTER — HOSPITAL ENCOUNTER (OUTPATIENT)
Dept: INFUSION THERAPY | Age: 66
Discharge: HOME | End: 2021-08-17
Payer: COMMERCIAL

## 2021-08-17 DIAGNOSIS — D72.819 LEUKOPENIA, UNSPECIFIED TYPE: ICD-10-CM

## 2021-08-17 DIAGNOSIS — R71.8 MICROCYTOSIS: ICD-10-CM

## 2021-08-17 LAB
BASOPHILS ABSOLUTE: 0.04 E9/L (ref 0–0.2)
BASOPHILS RELATIVE PERCENT: 0.7 % (ref 0–2)
EOSINOPHILS ABSOLUTE: 0.23 E9/L (ref 0.05–0.5)
EOSINOPHILS RELATIVE PERCENT: 4.2 % (ref 0–6)
FERRITIN: 21 NG/ML
HCT VFR BLD CALC: 41.5 % (ref 34–48)
HEMOGLOBIN: 13.1 G/DL (ref 11.5–15.5)
IMMATURE GRANULOCYTES #: 0.01 E9/L
IMMATURE GRANULOCYTES %: 0.2 % (ref 0–5)
IRON SATURATION: 13 % (ref 15–50)
IRON: 48 MCG/DL (ref 37–145)
LYMPHOCYTES ABSOLUTE: 1.38 E9/L (ref 1.5–4)
LYMPHOCYTES RELATIVE PERCENT: 25.1 % (ref 20–42)
MCH RBC QN AUTO: 26.7 PG (ref 26–35)
MCHC RBC AUTO-ENTMCNC: 31.6 % (ref 32–34.5)
MCV RBC AUTO: 84.5 FL (ref 80–99.9)
MONOCYTES ABSOLUTE: 0.54 E9/L (ref 0.1–0.95)
MONOCYTES RELATIVE PERCENT: 9.8 % (ref 2–12)
NEUTROPHILS ABSOLUTE: 3.3 E9/L (ref 1.8–7.3)
NEUTROPHILS RELATIVE PERCENT: 60 % (ref 43–80)
PDW BLD-RTO: 14 FL (ref 11.5–15)
PLATELET # BLD: 208 E9/L (ref 130–450)
PMV BLD AUTO: 9.6 FL (ref 7–12)
RBC # BLD: 4.91 E12/L (ref 3.5–5.5)
TOTAL IRON BINDING CAPACITY: 366 MCG/DL (ref 250–450)
WBC # BLD: 5.5 E9/L (ref 4.5–11.5)

## 2021-08-17 PROCEDURE — 83550 IRON BINDING TEST: CPT

## 2021-08-17 PROCEDURE — 83540 ASSAY OF IRON: CPT

## 2021-08-17 PROCEDURE — 85025 COMPLETE CBC W/AUTO DIFF WBC: CPT

## 2021-08-17 PROCEDURE — 36415 COLL VENOUS BLD VENIPUNCTURE: CPT

## 2021-08-17 PROCEDURE — 82728 ASSAY OF FERRITIN: CPT

## 2021-08-19 ENCOUNTER — OFFICE VISIT (OUTPATIENT)
Dept: ONCOLOGY | Age: 66
End: 2021-08-19
Payer: COMMERCIAL

## 2021-08-19 VITALS
TEMPERATURE: 97.7 F | WEIGHT: 293 LBS | HEART RATE: 79 BPM | OXYGEN SATURATION: 96 % | BODY MASS INDEX: 45.99 KG/M2 | RESPIRATION RATE: 18 BRPM | DIASTOLIC BLOOD PRESSURE: 91 MMHG | SYSTOLIC BLOOD PRESSURE: 164 MMHG | HEIGHT: 67 IN

## 2021-08-19 DIAGNOSIS — D72.819 LEUKOPENIA, UNSPECIFIED TYPE: ICD-10-CM

## 2021-08-19 DIAGNOSIS — I26.99 OTHER ACUTE PULMONARY EMBOLISM, UNSPECIFIED WHETHER ACUTE COR PULMONALE PRESENT (HCC): Primary | ICD-10-CM

## 2021-08-19 DIAGNOSIS — R71.8 MICROCYTOSIS: ICD-10-CM

## 2021-08-19 PROCEDURE — 99214 OFFICE O/P EST MOD 30 MIN: CPT | Performed by: INTERNAL MEDICINE

## 2021-08-19 PROCEDURE — 99212 OFFICE O/P EST SF 10 MIN: CPT

## 2021-08-19 NOTE — PROGRESS NOTES
[address]  Dept: [phone]  Loc: [phone]  Attending progress note      Reason for Visit:   Leukopenia, history of PE. Referring Physician: Aftab Saez CNP    PCP:  Moriah Morgan DO    History of Present Illness: The patient is a very pleasant 42-year-old lady,  with a PMH significant for hyperlipedemia, obesity, OA, I met the patient initially in 2016 after diagnosis with PE,she underwent on 1/29/2016 a left total hip arthroplasty, the patient was on Xarelto for about 2 weeks following her surgery. On 3/8/2016 the patient started having chest pain, shortness of breath, palpitations and lightheadedness, she was seen at the ED on 3/9/2916, had a CTA chest done revealing Extensive filling defects involve the distal central right and left pulmonary arteries, in addition to the bilateral upper and lower lobe and right middle lobe pulmonary arteries. Findings are compatible with pulmonary emboli. She had b/l LE venous ultrasounds done, were negative for DVTs. The patient was on Xarelto for 1 year, it was discontinued on 3/22/2017. No recurrent DVTs. She continues to be on baby aspirin. The patient has a history of intermittent leukopenia, her CBCD from 2/4/2021 was remarkable for a white count of 4.3, ANC 2130, hemoglobin 11.6, hematocrit 37.1, MCV 78.1, platelets 018I. The patient was donating blood about once every 8 weeks. The patient has been taking the oral iron every 2 to 3 days, sometimes she has excess gas and loose stools from the oral iron. She is doing well overall    Review of Systems;  CONSTITUTIONAL: No fever, chills. Good appetite, positive for fatigue. ENMT: Eyes: No diplopia; Nose: No epistaxis. Mouth: No sore throat. RESPIRATORY: No hemoptysis, positive for shortness of breath on exertion, chest CTA was negative for PE, no cough. CARDIOVASCULAR: No chest pain, palpitations.   GASTROINTESTINAL: No nausea/vomiting, abdominal pain, diarrhea/constipation. GENITOURINARY: No dysuria, urinary frequency, hematuria. NEURO: No syncope, presyncope, headache.   Remainder:  ROS NEGATIVE    Past Medical History:      Diagnosis Date    Anticoagulant long-term use 03/2016    Xarelto    Asthma     H/O cardiovascular stress test 03/04/2020    Hemorrhoids     Hiatal hernia     Hx of blood clots 03/2016    pulmonary embolism    Hyperlipidemia     Hypertension     Obesity     morbid    Osteoarthritis     Postoperative surgical complication involving digestive system     Wears dentures     permanent upper bridge front right tooth, next 2 on right     Patient Active Problem List   Diagnosis    Primary osteoarthritis of left hip    Acute pulmonary embolism (HCC)    Hyperlipidemia    Obesity    Family hx of colon cancer requiring screening colonoscopy    IZQUIERDO (dyspnea on exertion)        Past Surgical History:      Procedure Laterality Date    APPENDECTOMY      CARPAL TUNNEL RELEASE      left    COLONOSCOPY  10/26/2016    Dr. Ashwini Sheehan, Findings: Normal Colon to the cecum - repeat in 10 years    CYST REMOVAL      EYE SURGERY Left 2016    retina tear    GASTRIC BAND  2/27/2007    Lap; 10 cm band; with hiatal hernia repair    JOINT REPLACEMENT Left 1/29/2016    total hip    JOINT REPLACEMENT Bilateral     knees    OTHER SURGICAL HISTORY  12/05/2016    laparscopic gastric band removal and hiatal hernia repair    OVARY REMOVAL Right     right due to cyst    TOTAL KNEE ARTHROPLASTY Bilateral 2012    UPPER GASTROINTESTINAL ENDOSCOPY  2007    UPPER GASTROINTESTINAL ENDOSCOPY  10/26/2016    Dr. Ashwini Sheehan, Findings: GE Reflux, 2.5cm Hiatal Hernia with the slipped stomach up in the chest       Family History:  Family History   Problem Relation Age of Onset    Cancer Father 39        bladder    No Known Problems Mother     No Known Problems Sister     No Known Problems Brother     No Known Problems Maternal Aunt     No Known Problems Maternal Uncle     Cancer Paternal Aunt         lung    No Known Problems Paternal Uncle     No Known Problems Maternal Grandmother     No Known Problems Maternal Grandfather     No Known Problems Paternal Grandmother     No Known Problems Paternal Grandfather     Mult Sclerosis Paternal Cousin        Medications:  Reviewed and reconciled. Social History:  Social History     Socioeconomic History    Marital status: Single     Spouse name: Not on file    Number of children: Not on file    Years of education: Not on file    Highest education level: Not on file   Occupational History    Not on file   Tobacco Use    Smoking status: Never Smoker    Smokeless tobacco: Never Used   Vaping Use    Vaping Use: Never used   Substance and Sexual Activity    Alcohol use: No     Comment: 1 to 2 times per year. Hot tea andice tea 1 cup a day.  Drug use: No    Sexual activity: Never   Other Topics Concern    Not on file   Social History Narrative    Not on file     Social Determinants of Health     Financial Resource Strain:     Difficulty of Paying Living Expenses:    Food Insecurity:     Worried About Running Out of Food in the Last Year:     [address] in the Last Year:    Transportation Needs:     Lack of Transportation (Medical):  Lack of Transportation (Non-Medical):    Physical Activity:     Days of Exercise per Week:     Minutes of Exercise per Session:    Stress:     Feeling of Stress :    Social Connections:     Frequency of Communication with Friends and Family:     Frequency of Social Gatherings with Friends and Family:     Attends Judaism Services:     Active Member of Clubs or Organizations:     Attends Club or Organization Meetings:     Marital Status:    Intimate Partner Violence:     Fear of Current or Ex-Partner:     Emotionally Abused:     Physically Abused:     Sexually Abused:         Allergies:  No Known Allergies    Physical Exam:  BP (!) 164/91   Pulse 79   Temp 97.7 °F (36.5 °C)   Resp 18   Ht 5' 6.5\" (1.689 m)   Wt (!) 321 lb (145.6 kg)   SpO2 96%   BMI 51.03 kg/m²   GENERAL: Alert, oriented x 3, not in acute distress. HEENT: PERRLA; EOMI. Oropharynx clear. NECK: Supple. No palpable cervical or supraclavicular lymphadenopathy. LUNGS: Good air entry bilaterally. No wheezing, crackles or rhonchi. CARDIOVASCULAR: Regular rate. No murmurs, rubs or gallops. ABDOMEN: Soft. Non-tender, non-distended. Positive bowel sounds. EXTREMITIES: Without clubbing, cyanosis, or edema. NEUROLOGIC: No focal deficits. ECOG PS 1    Impression/Plan:     The patient is a very pleasant 73-year-old lady,  with a PMH significant for hyperlipedemia, obesity, OA, I met the patient initially in 2016 after diagnosis with PE,she underwent on 1/29/2016 a left total hip arthroplasty, the patient was on Xarelto for about 2 weeks following her surgery. On 3/8/2016 the patient started having chest pain, shortness of breath, palpitations and lightheadedness, she was seen at the ED on 3/9/2916, had a CTA chest done revealing Extensive filling defects involve the distal central right and left pulmonary arteries, in addition to the bilateral upper and lower lobe and right middle lobe pulmonary arteries. Findings are compatible with pulmonary emboli. She had b/l LE venous ultrasounds done, were negative for DVTs. The patient had a provoked event by a transient surgical risk factor, given the fact that she had extensive bilateral PEs, recommended anticoagulation with Xarelto for a year, repeat chest CT was done revealing complete resolution of the PEs, Xarelto was discontinued on 3/22/2017. She was started on baby aspirin. No recurrent DVTs. She continues to be on baby aspirin.     The patient had mild leukopenia, without lymphocytopenia or neutropenia, most likely was reactive,,ordered a work-up to evaluate for chronic viral infections, autoimmune etiology, and nutritional deficiencies, JOHNNA is negative, no hepatitis or HIV infections, no vitamin B12 or folate deficiency, peripheral blood flow cytometry is unremarkable. Her white count had normalized, ALC had improved. We will continue with her CBC. Mild microcytic anemia, fit test is negative, she is up-to-date with colonoscopies. Most likely this is secondary to blood donation, the patient was started on oral iron, her hemoglobin/hematocrit had normalized, hemoglobin is 13.1, hematocrit 41.5, she still has very mild iron deficiency, continue oral iron every 2 to 3 days. I discussed with Bob English the importance of weight loss. RTC in about 3 months    Thank you for allowing us to participate in the care of Mrs. Wallace.     Ilan Price MD   HEMATOLOGY/MEDICAL [facility] [address]  [address] [address]  Dept: Malik: [phone]

## 2021-10-13 ENCOUNTER — OFFICE VISIT (OUTPATIENT)
Dept: SURGERY | Age: 66
End: 2021-10-13
Payer: COMMERCIAL

## 2021-10-13 VITALS
OXYGEN SATURATION: 96 % | HEIGHT: 67 IN | BODY MASS INDEX: 45.99 KG/M2 | TEMPERATURE: 98.1 F | DIASTOLIC BLOOD PRESSURE: 80 MMHG | SYSTOLIC BLOOD PRESSURE: 118 MMHG | WEIGHT: 293 LBS | HEART RATE: 84 BPM

## 2021-10-13 DIAGNOSIS — R22.0 MASS OF FACE: Primary | ICD-10-CM

## 2021-10-13 PROCEDURE — 99204 OFFICE O/P NEW MOD 45 MIN: CPT | Performed by: PLASTIC SURGERY

## 2021-10-13 NOTE — PROGRESS NOTES
ARTHROPLASTY Bilateral 2012    UPPER GASTROINTESTINAL ENDOSCOPY  2007    UPPER GASTROINTESTINAL ENDOSCOPY  10/26/2016    Dr. Emily Domingo, Findings: GE Reflux, 2.5cm Hiatal Hernia with the slipped stomach up in the chest     Current Medications:      Current Outpatient Medications   Medication Sig Dispense Refill    ferrous sulfate (IRON 325) 325 (65 Fe) MG tablet Take 1 tablet by mouth daily (with breakfast) 60 tablet 1    metFORMIN (GLUCOPHAGE-XR) 500 MG extended release tablet Take 500 mg by mouth Daily with supper      metoprolol tartrate (LOPRESSOR) 25 MG tablet Take 1 tablet by mouth 2 times daily 60 tablet 9    aspirin 81 MG EC tablet Take 1 tablet by mouth daily      nitroGLYCERIN (NITROSTAT) 0.4 MG SL tablet up to max of 3 total doses. If no relief after 1 dose, call 911. 25 tablet 1    naproxen sodium (ALEVE) 220 MG tablet Take 220 mg by mouth 2 times daily as needed for Pain      atorvastatin (LIPITOR) 20 MG tablet Take 20 mg by mouth nightly      omeprazole (PRILOSEC) 20 MG delayed release capsule Take 20 mg by mouth Daily      acetaminophen (TYLENOL) 500 MG tablet Take 500 mg by mouth every 6 hours as needed for Pain      Multiple Vitamins-Calcium (ONE-A-DAY WOMENS PO) Take 1 tablet by mouth daily      Cholecalciferol 4000 UNITS CAPS Take 1 capsule by mouth daily       No current facility-administered medications for this visit. Allergies:  Patient has no known allergies. Social History:   Social History     Socioeconomic History    Marital status: Single     Spouse name: Not on file    Number of children: Not on file    Years of education: Not on file    Highest education level: Not on file   Occupational History    Not on file   Tobacco Use    Smoking status: Never Smoker    Smokeless tobacco: Never Used   Vaping Use    Vaping Use: Never used   Substance and Sexual Activity    Alcohol use: No     Comment: 1 to 2 times per year. Hot tea andice tea 1 cup a day.  Drug use:  No  Sexual activity: Never   Other Topics Concern    Not on file   Social History Narrative    Not on file     Social Determinants of Health     Financial Resource Strain:     Difficulty of Paying Living Expenses:    Food Insecurity:     Worried About Running Out of Food in the Last Year:     [address] in the Last Year:    Transportation Needs:     Lack of Transportation (Medical):  Lack of Transportation (Non-Medical):    Physical Activity:     Days of Exercise per Week:     Minutes of Exercise per Session:    Stress:     Feeling of Stress :    Social Connections:     Frequency of Communication with Friends and Family:     Frequency of Social Gatherings with Friends and Family:     Attends Sikhism Services:     Active Member of Clubs or Organizations:     Attends Club or Organization Meetings:     Marital Status:    Intimate Partner Violence:     Fear of Current or Ex-Partner:     Emotionally Abused:     Physically Abused:     Sexually Abused:      Family History:   Family History   Problem Relation Age of Onset    Cancer Father 39        bladder    No Known Problems Mother     No Known Problems Sister     No Known Problems Brother     No Known Problems Maternal Aunt     No Known Problems Maternal Uncle     Cancer Paternal Aunt         lung    No Known Problems Paternal Uncle     No Known Problems Maternal Grandmother     No Known Problems Maternal Grandfather     No Known Problems Paternal Grandmother     No Known Problems Paternal Grandfather     Mult Sclerosis Paternal Cousin        REVIEW OF SYSTEMS:    CONSTITUTIONAL:  negative for  fevers, chills, sweats and fatigue  EYES: negative for dipolpia or acute vision loss. RESPIRATORY:  negative for  dry cough, cough with sputum, dyspnea, wheezing and chest pain  HENT:negative for pain, headache, difficulty swallowing or nose bleeds.   CARDIOVASCULAR:  negative for  chest pain, dyspnea, palpitations, syncope  GASTROINTESTINAL:  negative for nausea, vomiting, change in bowel habits, diarrhea, constipation and abdominal pain  EXTREMITIES: negative for edema  MUSCULOSKELETAL: negative for muscle weakness  SKIN: positive for lesion,negative for itching or rashes. HEME: negative for easy brusing or bleeding  BEHAVIOR/PSYCH:  negative for poor appetite, increased appetite, decreased sleep and poor concentration    PHYSICAL EXAM:        VITALS:  /80 (Site: Left Upper Arm, Position: Sitting, Cuff Size: Large Adult)   Pulse 84   Temp 98.1 °F (36.7 °C) (Temporal)   Ht 5' 6.5\" (1.689 m)   Wt (!) 320 lb (145.2 kg)   SpO2 96%   Breastfeeding No   BMI 50.88 kg/m²   CONSTITUTIONAL:  awake, alert, cooperative, no apparent distress, and appears stated age  EYES: PERRLA, EOMI, no signs of occular infection  LUNGS:  No increased work of breathing, good air exchange  CARDIOVASCULAR:  regular rate and rhythm   EXTREMITIES: no signs of clubbing or cyanosis. MUSCULOSKELETAL: negative for flaccid muscle tone or spastic movements. NEURO: Cranial nerves II-XII grossly intact. No signs of agitated mood. SKIN: eyebrow-  1.5mm x 1.5mm,  pink in color, cystic and clear border, mildly raised, no signs of bleeding,drainage or infection. Blanching, mobile, fluctuant. Mild tender to palpation.          DATA:    Labs: CBC:   Lab Results   Component Value Date    WBC 5.5 08/17/2021    RBC 4.91 08/17/2021    HGB 13.1 08/17/2021    HCT 41.5 08/17/2021    MCV 84.5 08/17/2021    MCH 26.7 08/17/2021    MCHC 31.6 08/17/2021    RDW 14.0 08/17/2021     08/17/2021    MPV 9.6 08/17/2021     BMP:    Lab Results   Component Value Date     03/17/2021    K 4.5 03/17/2021     03/17/2021    CO2 25 03/17/2021    BUN 21 03/17/2021    LABALBU 3.9 03/17/2021    CREATININE 0.9 03/17/2021    CALCIUM 9.0 03/17/2021    GFRAA >60 03/17/2021    LABGLOM >60 03/17/2021    GLUCOSE 152 03/17/2021       Radiology Review:  No radiology needed at this time  Pathology Review: No Pathology reviewed       IMPRESSION/RECOMMENDATIONS:        Diagnosis  -) Left eyebrow mass    After discussion with the patient, I do not think operative interventions are warranted at this time. The patient is instructed to schedule an appointment if there are any change in symptoms or appearance of the mass; otherwise schedule an appointment in April if mass continue to persist.       This document is generated, in part, by voice recognition software and thus  syntax and grammatical errors are possible. Roselia Baker DO  10:45 AM  10/13/2021        Attending Physician Statement  I have discussed the case, including pertinent history and exam findings with the resident. I have seen and examined the patient and the key elements of all parts of the encounter have been performed by me. I agree with the assessment, exam, plan and orders as documented by the resident. None discrete fullness of left upper brow consistent with previous history of trauma. No concern at this time for cyst or other discrete mass. Continue to watch the area perform massage. Patient can follow-up in 6 months if there are still concerns or if any acute changes arise. I attest that the patient was seen and examined by me, and concur with the documentation above. I agree with the assessment and the plan outlined. This document is generated, in part, by voice recognition software and thus  syntax and grammatical errors are possible.     Jenniffer Oneil

## 2021-11-17 ENCOUNTER — HOSPITAL ENCOUNTER (OUTPATIENT)
Dept: INFUSION THERAPY | Age: 66
Discharge: HOME | End: 2021-11-17
Payer: COMMERCIAL

## 2021-11-17 DIAGNOSIS — Z80.0 FAMILY HX OF COLON CANCER REQUIRING SCREENING COLONOSCOPY: Primary | ICD-10-CM

## 2021-11-17 LAB
BASOPHILS ABSOLUTE: 0.04 E9/L (ref 0–0.2)
BASOPHILS RELATIVE PERCENT: 0.7 % (ref 0–2)
EOSINOPHILS ABSOLUTE: 0.27 E9/L (ref 0.05–0.5)
EOSINOPHILS RELATIVE PERCENT: 4.8 % (ref 0–6)
FERRITIN: 19 NG/ML
HCT VFR BLD CALC: 41.9 % (ref 34–48)
HEMOGLOBIN: 13 G/DL (ref 11.5–15.5)
IMMATURE GRANULOCYTES #: 0.01 E9/L
IMMATURE GRANULOCYTES %: 0.2 % (ref 0–5)
IRON SATURATION: 14 % (ref 15–50)
IRON: 55 MCG/DL (ref 37–145)
LYMPHOCYTES ABSOLUTE: 1.55 E9/L (ref 1.5–4)
LYMPHOCYTES RELATIVE PERCENT: 27.3 % (ref 20–42)
MCH RBC QN AUTO: 25.9 PG (ref 26–35)
MCHC RBC AUTO-ENTMCNC: 31 % (ref 32–34.5)
MCV RBC AUTO: 83.6 FL (ref 80–99.9)
MONOCYTES ABSOLUTE: 0.57 E9/L (ref 0.1–0.95)
MONOCYTES RELATIVE PERCENT: 10.1 % (ref 2–12)
NEUTROPHILS ABSOLUTE: 3.23 E9/L (ref 1.8–7.3)
NEUTROPHILS RELATIVE PERCENT: 56.9 % (ref 43–80)
PDW BLD-RTO: 13.1 FL (ref 11.5–15)
PLATELET # BLD: 217 E9/L (ref 130–450)
PMV BLD AUTO: 9.4 FL (ref 7–12)
RBC # BLD: 5.01 E12/L (ref 3.5–5.5)
TOTAL IRON BINDING CAPACITY: 384 MCG/DL (ref 250–450)
WBC # BLD: 5.7 E9/L (ref 4.5–11.5)

## 2021-11-17 PROCEDURE — 36415 COLL VENOUS BLD VENIPUNCTURE: CPT

## 2021-11-17 PROCEDURE — 82728 ASSAY OF FERRITIN: CPT

## 2021-11-17 PROCEDURE — 83540 ASSAY OF IRON: CPT

## 2021-11-17 PROCEDURE — 83550 IRON BINDING TEST: CPT

## 2021-11-17 PROCEDURE — 85025 COMPLETE CBC W/AUTO DIFF WBC: CPT

## 2021-11-18 ENCOUNTER — OFFICE VISIT (OUTPATIENT)
Dept: ONCOLOGY | Age: 66
End: 2021-11-18
Payer: COMMERCIAL

## 2021-11-18 VITALS
WEIGHT: 293 LBS | HEART RATE: 78 BPM | OXYGEN SATURATION: 99 % | HEIGHT: 67 IN | BODY MASS INDEX: 45.99 KG/M2 | TEMPERATURE: 97.7 F | DIASTOLIC BLOOD PRESSURE: 98 MMHG | SYSTOLIC BLOOD PRESSURE: 158 MMHG

## 2021-11-18 DIAGNOSIS — E66.01 CLASS 2 SEVERE OBESITY DUE TO EXCESS CALORIES WITH SERIOUS COMORBIDITY IN ADULT, UNSPECIFIED BMI (HCC): Primary | ICD-10-CM

## 2021-11-18 PROCEDURE — 99212 OFFICE O/P EST SF 10 MIN: CPT

## 2021-11-18 PROCEDURE — 99213 OFFICE O/P EST LOW 20 MIN: CPT | Performed by: INTERNAL MEDICINE

## 2021-11-18 NOTE — PROGRESS NOTES
[address]  Dept: [phone]  Loc: [phone]  Attending progress note      Reason for Visit:   Leukopenia, history of PE. Referring Physician: Magdalena Cha CNP    PCP:  Melodie Fernandes DO    History of Present Illness: The patient is a very pleasant 79-year-old lady,  with a PMH significant for hyperlipedemia, obesity, OA, I met the patient initially in 2016 after diagnosis with PE,she underwent on 1/29/2016 a left total hip arthroplasty, the patient was on Xarelto for about 2 weeks following her surgery. On 3/8/2016 the patient started having chest pain, shortness of breath, palpitations and lightheadedness, she was seen at the ED on 3/9/2916, had a CTA chest done revealing Extensive filling defects involve the distal central right and left pulmonary arteries, in addition to the bilateral upper and lower lobe and right middle lobe pulmonary arteries. Findings are compatible with pulmonary emboli. She had b/l LE venous ultrasounds done, were negative for DVTs. The patient was on Xarelto for 1 year, it was discontinued on 3/22/2017. No recurrent DVTs. She continues to be on baby aspirin. The patient has a history of intermittent leukopenia, her CBCD from 2/4/2021 was remarkable for a white count of 4.3, ANC 2130, hemoglobin 11.6, hematocrit 37.1, MCV 78.1, platelets 867Z. The patient was donating blood about once every 8 weeks. The patient has been taking the oral iron every 2 to 3 days, no blood donation since her last visit. She is not able to take the oral iron once daily due to GI side effects. Review of Systems;  CONSTITUTIONAL: No fever, chills. Good appetite, positive for fatigue. Positive for weight gain. ENMT: Eyes: No diplopia; Nose: No epistaxis. Mouth: No sore throat. RESPIRATORY: No hemoptysis, positive for shortness of breath from asthma. CARDIOVASCULAR: No chest pain, palpitations.   GASTROINTESTINAL: No nausea/vomiting, abdominal pain, diarrhea/constipation. GENITOURINARY: No dysuria, urinary frequency, hematuria. NEURO: No syncope, presyncope, headache.   Remainder:  ROS NEGATIVE    Past Medical History:      Diagnosis Date    Anticoagulant long-term use 03/2016    Xarelto    Asthma     H/O cardiovascular stress test 03/04/2020    Hemorrhoids     Hiatal hernia     Hx of blood clots 03/2016    pulmonary embolism    Hyperlipidemia     Hypertension     Obesity     morbid    Osteoarthritis     Postoperative surgical complication involving digestive system     Wears dentures     permanent upper bridge front right tooth, next 2 on right     Patient Active Problem List   Diagnosis    Primary osteoarthritis of left hip    Acute pulmonary embolism (HCC)    Hyperlipidemia    Obesity    Family hx of colon cancer requiring screening colonoscopy    IZQUIERDO (dyspnea on exertion)        Past Surgical History:      Procedure Laterality Date    APPENDECTOMY      CARPAL TUNNEL RELEASE      left    COLONOSCOPY  10/26/2016    Dr. Antony Ramon, Findings: Normal Colon to the cecum - repeat in 10 years    CYST REMOVAL      EYE SURGERY Left 2016    retina tear    GASTRIC BAND  2/27/2007    Lap; 10 cm band; with hiatal hernia repair    JOINT REPLACEMENT Left 1/29/2016    total hip    JOINT REPLACEMENT Bilateral     knees    OTHER SURGICAL HISTORY  12/05/2016    laparscopic gastric band removal and hiatal hernia repair    OVARY REMOVAL Right     right due to cyst    TOTAL KNEE ARTHROPLASTY Bilateral 2012    UPPER GASTROINTESTINAL ENDOSCOPY  2007    UPPER GASTROINTESTINAL ENDOSCOPY  10/26/2016    Dr. Antony Ramon, Findings: GE Reflux, 2.5cm Hiatal Hernia with the slipped stomach up in the chest       Family History:  Family History   Problem Relation Age of Onset    Cancer Father 39        bladder    No Known Problems Mother     No Known Problems Sister     No Known Problems Brother     No Known Problems Maternal Aunt     No Known Problems Maternal Uncle     Cancer Paternal Aunt         lung    No Known Problems Paternal Uncle     No Known Problems Maternal Grandmother     No Known Problems Maternal Grandfather     No Known Problems Paternal Grandmother     No Known Problems Paternal Grandfather     Mult Sclerosis Paternal Cousin        Medications:  Reviewed and reconciled. Social History:  Social History     Socioeconomic History    Marital status: Single     Spouse name: Not on file    Number of children: Not on file    Years of education: Not on file    Highest education level: Not on file   Occupational History    Not on file   Tobacco Use    Smoking status: Never Smoker    Smokeless tobacco: Never Used   Vaping Use    Vaping Use: Never used   Substance and Sexual Activity    Alcohol use: No     Comment: 1 to 2 times per year. Hot tea andice tea 1 cup a day.  Drug use: No    Sexual activity: Never   Other Topics Concern    Not on file   Social History Narrative    Not on file     Social Determinants of Health     Financial Resource Strain:     Difficulty of Paying Living Expenses: Not on file   Food Insecurity:     Worried About Running Out of Food in the Last Year: Not on file    Elizabeth of Food in the Last Year: Not on file   Transportation Needs:     Lack of Transportation (Medical): Not on file    Lack of Transportation (Non-Medical):  Not on file   Physical Activity:     Days of Exercise per Week: Not on file    Minutes of Exercise per Session: Not on file   Stress:     Feeling of Stress : Not on file   Social Connections:     Frequency of Communication with Friends and Family: Not on file    Frequency of Social Gatherings with Friends and Family: Not on file    Attends Mu-ism Services: Not on file    Active Member of Clubs or Organizations: Not on file    Attends Club or Organization Meetings: Not on file    Marital Status: Not on file   Intimate Partner Violence:     Fear of Current or Ex-Partner: Not on file    Emotionally Abused: Not on file    Physically Abused: Not on file    Sexually Abused: Not on file   Housing Stability:     Unable to Pay for Housing in the Last Year: Not on file    Number of [place] in the Last Year: Not on file    Unstable Housing in the Last Year: Not on file       Allergies:  No Known Allergies    Physical Exam:  BP (!) 158/98   Pulse 78   Temp 97.7 °F (36.5 °C)   Ht 5' 6.5\" (1.689 m)   Wt (!) 331 lb 4.8 oz (150.3 kg)   SpO2 99%   BMI 52.67 kg/m²   GENERAL: Alert, oriented x 3, not in acute distress. HEENT: PERRLA; EOMI. Oropharynx clear. NECK: Supple. No palpable cervical or supraclavicular lymphadenopathy. LUNGS: Good air entry bilaterally. No wheezing, crackles or rhonchi. CARDIOVASCULAR: Regular rate. No murmurs, rubs or gallops. ABDOMEN: Soft. Non-tender, non-distended. Positive bowel sounds. EXTREMITIES: Without clubbing, cyanosis, or edema. NEUROLOGIC: No focal deficits. ECOG PS 1    Impression/Plan:     The patient is a very pleasant 80-year-old lady,  with a PMH significant for hyperlipedemia, obesity, OA, I met the patient initially in 2016 after diagnosis with PE,she underwent on 1/29/2016 a left total hip arthroplasty, the patient was on Xarelto for about 2 weeks following her surgery. On 3/8/2016 the patient started having chest pain, shortness of breath, palpitations and lightheadedness, she was seen at the ED on 3/9/2916, had a CTA chest done revealing Extensive filling defects involve the distal central right and left pulmonary arteries, in addition to the bilateral upper and lower lobe and right middle lobe pulmonary arteries. Findings are compatible with pulmonary emboli. She had b/l LE venous ultrasounds done, were negative for DVTs.   The patient had a provoked event by a transient surgical risk factor, given the fact that she had extensive bilateral PEs, recommended anticoagulation with Xarelto for a year, repeat chest CT was done revealing complete resolution of the PEs, Xarelto was discontinued on 3/22/2017. She was started on baby aspirin. No recurrent DVTs. She continues to be on baby aspirin. The patient had mild leukopenia, without lymphocytopenia or neutropenia, most likely was reactive,,ordered a work-up to evaluate for chronic viral infections, autoimmune etiology, and nutritional deficiencies, JOHNNA is negative, no hepatitis or HIV infections, no vitamin B12 or folate deficiency, peripheral blood flow cytometry is unremarkable. Her white count had normalized, ALC had improved. We will continue with her CBC. Mild microcytic anemia, fit test is negative, she is up-to-date with colonoscopies. Most likely this is secondary to blood donation, the patient was started on oral iron, her hemoglobin/hematocrit had normalized, ferritin and iron are normal, TSAT had improved to 14%, continue oral iron every 2 to 3 days as tolerated. I discussed with Princess Cintron the importance of weight loss, referral was placed to Dr. Jeffry Nieto. RTC in about 3 months    Thank you for allowing us to participate in the care of Mrs. Wallace.     Kamila Sharif MD   HEMATOLOGY/MEDICAL [address]  [address] [address]  Dept: [place]: [phone]

## 2021-12-03 ENCOUNTER — HOSPITAL ENCOUNTER (OUTPATIENT)
Age: 66
Discharge: HOME | End: 2021-12-03
Payer: COMMERCIAL

## 2021-12-03 LAB
ALBUMIN SERPL-MCNC: 3.8 G/DL (ref 3.5–5.2)
ALP BLD-CCNC: 68 U/L (ref 35–104)
ALT SERPL-CCNC: 20 U/L (ref 0–32)
ANION GAP SERPL CALCULATED.3IONS-SCNC: 11 MMOL/L (ref 7–16)
AST SERPL-CCNC: 17 U/L (ref 0–31)
BILIRUB SERPL-MCNC: 0.5 MG/DL (ref 0–1.2)
BUN BLDV-MCNC: 26 MG/DL (ref 6–23)
CALCIUM SERPL-MCNC: 9.1 MG/DL (ref 8.6–10.2)
CHLORIDE BLD-SCNC: 100 MMOL/L (ref 98–107)
CHOLESTEROL, FASTING: 127 MG/DL (ref 0–199)
CO2: 27 MMOL/L (ref 22–29)
CREAT SERPL-MCNC: 0.9 MG/DL (ref 0.5–1)
GFR AFRICAN AMERICAN: >60
GFR NON-AFRICAN AMERICAN: >60 ML/MIN/1.73
GLUCOSE BLD-MCNC: 156 MG/DL (ref 74–99)
HBA1C MFR BLD: 6.8 % (ref 4–5.6)
HDLC SERPL-MCNC: 46 MG/DL
LDL CHOLESTEROL CALCULATED: 57 MG/DL (ref 0–99)
POTASSIUM SERPL-SCNC: 4.3 MMOL/L (ref 3.5–5)
SODIUM BLD-SCNC: 138 MMOL/L (ref 132–146)
T4 FREE: 1.03 NG/DL (ref 0.93–1.7)
TOTAL PROTEIN: 6.4 G/DL (ref 6.4–8.3)
TRIGLYCERIDE, FASTING: 121 MG/DL (ref 0–149)
TSH SERPL DL<=0.05 MIU/L-ACNC: 1.42 UIU/ML (ref 0.27–4.2)
VLDLC SERPL CALC-MCNC: 24 MG/DL

## 2021-12-03 PROCEDURE — 80053 COMPREHEN METABOLIC PANEL: CPT

## 2021-12-03 PROCEDURE — 83036 HEMOGLOBIN GLYCOSYLATED A1C: CPT

## 2021-12-03 PROCEDURE — 84439 ASSAY OF FREE THYROXINE: CPT

## 2021-12-03 PROCEDURE — 36415 COLL VENOUS BLD VENIPUNCTURE: CPT

## 2021-12-03 PROCEDURE — 80061 LIPID PANEL: CPT

## 2021-12-03 PROCEDURE — 84443 ASSAY THYROID STIM HORMONE: CPT

## 2021-12-06 ENCOUNTER — TELEPHONE (OUTPATIENT)
Dept: BARIATRICS/WEIGHT MGMT | Age: 66
End: 2021-12-06

## 2022-01-12 ENCOUNTER — OFFICE VISIT (OUTPATIENT)
Dept: BARIATRICS/WEIGHT MGMT | Age: 67
End: 2022-01-12
Payer: COMMERCIAL

## 2022-01-12 VITALS
TEMPERATURE: 98.1 F | DIASTOLIC BLOOD PRESSURE: 81 MMHG | WEIGHT: 293 LBS | HEIGHT: 66 IN | HEART RATE: 81 BPM | BODY MASS INDEX: 47.09 KG/M2 | SYSTOLIC BLOOD PRESSURE: 147 MMHG

## 2022-01-12 DIAGNOSIS — R53.82 CHRONIC FATIGUE: Primary | ICD-10-CM

## 2022-01-12 DIAGNOSIS — E66.01 CLASS 3 SEVERE OBESITY DUE TO EXCESS CALORIES WITH SERIOUS COMORBIDITY AND BODY MASS INDEX (BMI) OF 50.0 TO 59.9 IN ADULT (HCC): ICD-10-CM

## 2022-01-12 PROBLEM — R53.83 FATIGUE: Status: ACTIVE | Noted: 2022-01-12

## 2022-01-12 PROCEDURE — 99204 OFFICE O/P NEW MOD 45 MIN: CPT | Performed by: INTERNAL MEDICINE

## 2022-01-12 PROCEDURE — 99202 OFFICE O/P NEW SF 15 MIN: CPT

## 2022-01-12 RX ORDER — FAMOTIDINE 20 MG/1
20 TABLET, FILM COATED ORAL DAILY
COMMUNITY
Start: 2021-10-26

## 2022-01-12 NOTE — PATIENT INSTRUCTIONS
Rules:  · 2 protein shakes (see below), and a frozen meal in a day as discussed (lean cuisine, healthy choice etc). · No other food with calories. Drink at least 2 liters of water in a day. Requirements:  · Make sure protein intake is at least 60 grams per day (do not count protein every day; instead spot check your intake every 2-3 weeks and make sure what you think you are getting is close to accurate; consider using a protein shake if needed; these are in the pharmacy section of the stores, not the grocery section; Premier, Pure Protein and Fairlife are relatively inexpensive and taste good to most patients; other options are Nectar, Boost Max, Ensure Max, BeneProtein and GNC lean (which is lactose-free); Nectar fruit, Premier Protein Clear, IsoPure Protein Drink, and Protein 2 O are water-based options; Quest (or Cosco, which is cheaper and is ordered on [employer]) and the Tapstream 1 protein bars can also be used, but have less protein in them )  (Disclaimer: Dietary supplements rarely have their listed ingredients and the amount of each verified by a third party other. Sometimes they give verification for their claims to be GMO and gluten free and to be organic. However, even such verifications as these may still be untrustworthy.)  · Make sure that fiber intake is at least 22 grams per day. Fiber one cereal (watch calories), benefiber etc.  · Take 22 almonds a day. · Take one multivitamin every day    Targets:  · Limit calorie intake to 800 calories/day, very low calorie diet (VLCD). · Walk 30 minutes daily  · Avoid eating 2 hours within bedtime. Tips:  · Do not eat outside of the dining room or the kitchen  · Do not eat while watching TV, videos, working on the computer or using a smart phone  · Do not eat food out of a multi-serving bag or container. · Establish 6 hours of food-free \"time-out\" periods (times you don't eat) each day. No period can be less than 1 hour long.  The periods need to be the

## 2022-01-12 NOTE — PROGRESS NOTES
CC -   Fatigue all the time over the last 2 years, Weight gain, exercise intolerance.     BACKGROUND -   First visit: 1/12/2022     Obesity   Began in childhood  Initial BMI 53.7, Wt 330.2 lbs  HS Grad wt 200 lbs  Lowest   wt 160 lbs (in high school)  Highest  wt 330 lbs  Pattern of wt gain: gradual, but has accelerated since 2020, attributes to decreased activity due to pain  Wt change past yr: +50 lbs  Most wt lost: about 50 lbs (after lap band 2007)  Other diets attempted: Had lap band that was removed after it slipped in 2016, otherwise no specific program, but tried to watch diet    Desire to lose weight: 9/10  Problem posed by appetite: 4/10    Initial Diet:    Number of meals per day - 3    Number of snacks per day - 1-2    Meal volume - 12\" plate, occasionally seconds    Fast food/convenience store - 1x/week    Restaurants (not fast food) - 0-1x/week   Sweets - 1d/week (candy bars or cakes)   Chips - 1d/week   Crackers/pretzels - 0d/week   Nuts - 0d/week   Peanut Butter - 0d/week   Popcorn - 1d/week   Dried fruit - 0d/week   Whole fruit - 3d/week -  - bananas/oranges/grapes   Breakfast cereal - 0d/week   Granola/Protein/Energy bar - 0d/week   Sugar sweetened beverages - tea with 1 teaspoon of sugar daily   Protein - No supplements   Fiber - No supplements     Exercise:    Gym membership - [employer]    Walking - no (pain, does some activity with dogs)    Running - no    Resistance - no    Aerobic class - no        Sleep: bedtime: 9-9:30 pm, wakes up at 3 and then falls back to sleep and wakes up at 6 am, wakes up tired.   ______________________    [facility] -    Past Medical History:   Diagnosis Date    Anticoagulant long-term use 03/2016    Xarelto    Asthma     H/O cardiovascular stress test 03/04/2020    Hemorrhoids     Hiatal hernia     Hx of blood clots 03/2016    pulmonary embolism    Hyperlipidemia     Hypertension     Obesity     morbid    Osteoarthritis     Postoperative surgical complication involving digestive system     Wears dentures     permanent upper bridge front right tooth, next 2 on right     Past Surgical History:   Procedure Laterality Date    APPENDECTOMY      CARPAL TUNNEL RELEASE      left    COLONOSCOPY  10/26/2016    Dr. Luc Stock, Findings: Normal Colon to the cecum - repeat in 10 years    CYST REMOVAL      EYE SURGERY Left 2016    retina tear    GASTRIC BAND  2/27/2007    Lap; 10 cm band; with hiatal hernia repair    JOINT REPLACEMENT Left 1/29/2016    total hip    JOINT REPLACEMENT Bilateral     knees    OTHER SURGICAL HISTORY  12/05/2016    laparscopic gastric band removal and hiatal hernia repair    OVARY REMOVAL Right     right due to cyst    TOTAL KNEE ARTHROPLASTY Bilateral 2012    UPPER GASTROINTESTINAL ENDOSCOPY  2007    UPPER GASTROINTESTINAL ENDOSCOPY  10/26/2016    Dr. Luc Stock, Findings: GE Reflux, 2.5cm Hiatal Hernia with the slipped stomach up in the chest         Current Outpatient Medications   Medication Sig Dispense Refill    ferrous sulfate (IRON 325) 325 (65 Fe) MG tablet Take 1 tablet by mouth daily (with breakfast) 60 tablet 1    metFORMIN (GLUCOPHAGE-XR) 500 MG extended release tablet Take 500 mg by mouth Daily with supper      metoprolol tartrate (LOPRESSOR) 25 MG tablet Take 1 tablet by mouth 2 times daily 60 tablet 9    aspirin 81 MG EC tablet Take 1 tablet by mouth daily      nitroGLYCERIN (NITROSTAT) 0.4 MG SL tablet up to max of 3 total doses.  If no relief after 1 dose, call 911. 25 tablet 1    naproxen sodium (ALEVE) 220 MG tablet Take 220 mg by mouth 2 times daily as needed for Pain      atorvastatin (LIPITOR) 20 MG tablet Take 20 mg by mouth nightly      omeprazole (PRILOSEC) 20 MG delayed release capsule Take 20 mg by mouth Daily      acetaminophen (TYLENOL) 500 MG tablet Take 500 mg by mouth every 6 hours as needed for Pain      Multiple Vitamins-Calcium (ONE-A-DAY WOMENS PO) Take 1 tablet by mouth daily      Cholecalciferol 4000 UNITS CAPS Take 1 capsule by mouth daily       No current facility-administered medications for this visit. Metformin 500 bid  Valsartan + Metoprolol for HTN    Family: no family history of DM or heart disease. No known allergies. Never smoked, rare alcohol. 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 Secretarial work. ROS -  Card - no CP  GI - no N/V/D/C  On and off depression    PE -  Gen : BP (!) 147/81 (Site: Left Upper Arm, Position: Sitting, Cuff Size: Thigh)   Pulse 81   Temp 98.1 °F (36.7 °C) (Temporal)   Ht 5' 5.75\" (1.67 m)   Wt (!) 330 lb 3.2 oz (149.8 kg)   BMI 53.70 kg/m²    WN, WD, NAD  Lung: Nml resp effort  Psych: Normal mood   Full affect  Neuro: Moves all ext well  ______________________    HISTORY & ASSESSMENT/PLAN -     Problem 1  - Fatigue   HPI   - chronic, but has progressed more rapidly in the last 2 years, along with her weight. Assessment  - uncontrolled  Plan   - 1. Weight reduction per plan below. 2. She may need outpatient sleep study for possible LULU, though she denies needing daytime naps. Problem 2  - Obesity   HPI   - See above Background for description    Weight  Date    330.2 lbs 01/12/2022  DEN = 2050 Jacky/d = 35359 Jacky/wk  Wt effect of HR foods = sweets, chips/popcorn = 50 Jacky/d = 350 Jacky/wk = 5.2 lb/year. Assessment  - uncontrolled  Plan   -   ?metoprolol can be changed to carvedilol (metoprolol known to increase weight)  Talked about surgical and non-surgical options, had lap band in the past. She is not interested in surgical options currently. Would like to try Very Low Calorie Diet. Does not feel need of appetite suppressant at this time. Planned as follows. Patient Instructions   Rules:  · 2 protein shakes (see below), and a frozen meal in a day as discussed (lean cuisine, healthy choice etc). · No other food with calories. Drink at least 2 liters of water in a day.     Requirements:  · Make sure protein intake is at least 60 grams per day (do not count protein every day; instead spot check your intake every 2-3 weeks and make sure what you think you are getting is close to accurate; consider using a protein shake if needed; these are in the pharmacy section of the stores, not the grocery section; Premier, Pure Protein and Fairlife are relatively inexpensive and taste good to most patients; other options are Nectar, Boost Max, Ensure Max, BeneProtein and GNC lean (which is lactose-free); Nectar fruit, Premier Protein Clear, IsoPure Protein Drink, and Protein 2 O are water-based options; Quest (or Cosco, which is cheaper and is ordered on [address] [facility] [address]) and the [place] 1 protein bars can also be used, but have less protein in them )  (Disclaimer: Dietary supplements rarely have their listed ingredients and the amount of each verified by a third party other. Sometimes they give verification for their claims to be GMO and gluten free and to be organic. However, even such verifications as these may still be untrustworthy.)  · Make sure that fiber intake is at least 22 grams per day. Fiber one cereal (watch calories), benefiber etc.  · Take 22 almonds a day. · Take one multivitamin every day    Targets:  · Limit calorie intake to 800 calories/day, very low calorie diet (VLCD). · Walk 30 minutes daily  · Avoid eating 2 hours within bedtime. Tips:  · Do not eat outside of the dining room or the kitchen  · Do not eat while watching TV, videos, working on the computer or using a smart phone  · Do not eat food out of a multi-serving bag or container. · Establish 6 hours of food-free \"time-out\" periods (times you don't eat) each day. No period can be less than 1 hour long. The periods need to be the same every day for days that are the same (for example, workdays would have one set of food free periods and weekends would have another set of days). These six hours are in addition to the two hours before bedtime and the time spent sleeping.     Return to see me in 4 weeks. Total time spent on encounter: 64 min. Charmaine Ayoub MD  Obesity Medicine  1/12/22.

## 2022-02-11 ENCOUNTER — OFFICE VISIT (OUTPATIENT)
Dept: BARIATRICS/WEIGHT MGMT | Age: 67
End: 2022-02-11
Payer: COMMERCIAL

## 2022-02-11 VITALS
SYSTOLIC BLOOD PRESSURE: 128 MMHG | BODY MASS INDEX: 47.09 KG/M2 | HEART RATE: 86 BPM | TEMPERATURE: 97.5 F | WEIGHT: 293 LBS | HEIGHT: 66 IN | DIASTOLIC BLOOD PRESSURE: 69 MMHG

## 2022-02-11 DIAGNOSIS — R53.82 CHRONIC FATIGUE: Primary | ICD-10-CM

## 2022-02-11 DIAGNOSIS — E66.01 CLASS 3 SEVERE OBESITY DUE TO EXCESS CALORIES WITH SERIOUS COMORBIDITY AND BODY MASS INDEX (BMI) OF 50.0 TO 59.9 IN ADULT (HCC): ICD-10-CM

## 2022-02-11 PROCEDURE — 99211 OFF/OP EST MAY X REQ PHY/QHP: CPT

## 2022-02-11 PROCEDURE — 99213 OFFICE O/P EST LOW 20 MIN: CPT | Performed by: INTERNAL MEDICINE

## 2022-02-11 NOTE — PATIENT INSTRUCTIONS
Breakfast -     one high protein shake                            + 20 grams of fiber. Do this by either eating 12 tablespoons of the original, plain Fiber One cereal every day or 4 tablespoons of wheat dextrin powder (Benefiber or a generic brand) every day. Work up to this amount slowly by starting with only one-eighth to one-fourth of the target amount and then adding another one-eighth to one-fourth every one or two weeks until reaching the target. Lunch -           one high protein shake                           + one a fat snack item     Dinner -          one frozen meal                           + one snack item     Shake options (<200 chela, >25 grams/protein) :  Nectar, Pure Protein, Premier, Boost Max, Ensure Max, BeneProtein and [employer] (which is lactose-free) are milk-based options; Nectar, Premier Protein Clear, IsoPure Protein Drink, and Protein 2 O are water-based options; (Premier Protein Clear, the water-based option, comes in a 20 oz bottle with 20 grams of protein and 90 calories. So you have to drink three each day which increases the cost.)  (Disclaimer: Dietary supplements rarely have their listed ingredients and the amount of each verified by a third party other. Sometimes they give verification for their claims to be GMO and gluten free and to be organic.  However, even such verifications as these may still be untrustworthy.)     Fat snack options (<150 chela, >11 grams of fat): 22 almonds, 1 1/2 tablespoon of a oil-based dressing or 4 tablespoons of [place] dressing on a bed of salad greens, 1 1/2 tablespoons of peanut butter, 1 Cranberry Avilla Servio options (<100 chela, no sweets): fruit, low fat/high protein [place] yogurt, mozzarella cheese stick, nuts, salad with dressing, peanut butter, chips/crackers/pretzels     Frozen meal options (<350 chela):  Weight Watchers Smart Ones, Lean Cuisine, Healthy Choice, Rayna's, Rachna's     Food substitutes for the shakes:  4 oz of baked, grilled or broiled chicken, turkey or fish, 10 egg whites     Take a multivitamin daily     Walk 30 min every day or equivalent (210 min/week)    Low calorie non-starchy vegetables:  Food (per 100 g) Calories Fibers T. Carbohydrates Protein Fat Sodium (mg) Potassium (mg)   Green Bell Peppers 10 1.7 4.6 0.9 0.2 3 175   Cucumbers 15 0.5 3.6 0.7 0.1 2 147   Celery 16 1.6 3 0.7 0.2 80 260   Tomatoes 18 1.2 3.9 0.9 0.2 5 237   Carrots 41 2.8 10 0.9 0.2 69 320   Cabbage 25 2.5 6 1.3 0.1 18 170   Broccoli 35 3.3 7.2 2.4 0.4 41 293   Cauliflower 23 2.3 4.1 1.8 0.5 15 142   Iceberg Lettuce 14 1.2 3 0.9 0.1 10 141   Kale 28 2 5.6 1.9 0.4 23 228   Brussel Sprouts 43 3.8 9 3.4 0.3 25 389   Spinach 23 2.4 3.8 3 0.3 70 466   Zucchini 15 1 2.7 1.1 0.4 3 264   Mushroom 28 2.2 5.3 2.2 0.5 2 356   Asparagus 22 2 4.1 2.4 0.2 14 224   Green Beans 35 3.2 7.9 1.9 0.3 1 146   Eggplant 35 2.5 8.7 0.8 0.2 1 123     Follow up in 6-8 weeks.

## 2022-02-11 NOTE — PROGRESS NOTES
Obesity     morbid    Osteoarthritis     Postoperative surgical complication involving digestive system     Wears dentures     permanent upper bridge front right tooth, next 2 on right     Past Surgical History:   Procedure Laterality Date    APPENDECTOMY      CARPAL TUNNEL RELEASE      left    COLONOSCOPY  10/26/2016    Dr. Sharon Peralta, Findings: Normal Colon to the cecum - repeat in 10 years    CYST REMOVAL      EYE SURGERY Left 2016    retina tear    GASTRIC BAND  2/27/2007    Lap; 10 cm band; with hiatal hernia repair    JOINT REPLACEMENT Left 1/29/2016    total hip    JOINT REPLACEMENT Bilateral     knees    OTHER SURGICAL HISTORY  12/05/2016    laparscopic gastric band removal and hiatal hernia repair    OVARY REMOVAL Right     right due to cyst    TOTAL KNEE ARTHROPLASTY Bilateral 2012    UPPER GASTROINTESTINAL ENDOSCOPY  2007    UPPER GASTROINTESTINAL ENDOSCOPY  10/26/2016    Dr. Sharon Peralta, Findings: GE Reflux, 2.5cm Hiatal Hernia with the slipped stomach up in the chest       Current Outpatient Medications   Medication Sig Dispense Refill    VALSARTAN-HYDROCHLOROTHIAZIDE PO       nitroGLYCERIN (NITROSTAT) 0.4 MG SL tablet up to max of 3 total doses.  If no relief after 1 dose, call 911. 25 tablet 1    famotidine (PEPCID) 20 MG tablet Take 20 mg by mouth daily      ferrous sulfate (IRON 325) 325 (65 Fe) MG tablet Take 1 tablet by mouth daily (with breakfast) 60 tablet 1    metFORMIN (GLUCOPHAGE-XR) 500 MG extended release tablet Take 500 mg by mouth Daily with supper      metoprolol tartrate (LOPRESSOR) 25 MG tablet Take 1 tablet by mouth 2 times daily 60 tablet 9    aspirin 81 MG EC tablet Take 1 tablet by mouth daily      naproxen sodium (ALEVE) 220 MG tablet Take 220 mg by mouth 2 times daily as needed for Pain      atorvastatin (LIPITOR) 20 MG tablet Take 20 mg by mouth nightly      omeprazole (PRILOSEC) 20 MG delayed release capsule Take 20 mg by mouth Daily      acetaminophen (TYLENOL) 500 MG tablet Take 500 mg by mouth every 6 hours as needed for Pain      Multiple Vitamins-Calcium (ONE-A-DAY WOMENS PO) Take 1 tablet by mouth daily      Cholecalciferol 4000 UNITS CAPS Take 1 capsule by mouth daily       No current facility-administered medications for this visit. Metformin 500 bid  Valsartan + Metoprolol for HTN    Family: no family history of DM or heart disease. No known allergies. Never smoked, rare alcohol. 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 Secretarial work. ROS -  Card - no CP  GI - no N/V/D/C  On and off depression    PE -  Gen : /69 (Site: Left Upper Arm, Position: Sitting, Cuff Size: Thigh)   Pulse 86   Temp 97.5 °F (36.4 °C) (Temporal)   Ht 5' 5.75\" (1.67 m)   Wt (!) 310 lb 12.8 oz (141 kg)   BMI 50.55 kg/m²    WN, WD, NAD  Lung: Nml resp effort, CTA b/l. Heart: s1 s2 rrr, no murmur noted. Psych: Normal mood   Full affect  Neuro: Moves all ext well  ______________________    HISTORY & ASSESSMENT/PLAN -     Problem 1  - Fatigue   HPI   - chronic, seems to be improving since last visit. Assessment  - improving  Plan   - 1. Weight reduction per plan below. 2. She may need outpatient sleep study for possible LULU gregorio if it recurs. Problem 2  - Obesity   HPI   - See above Background for description    Weight  Date    330.2 lbs 01/12/2022    310.8 lbs 2/11/22  Total weight change to date: -19.5 lbs  Average daily energy variance:   01/12/2022 - 2/11/2022: -19.5 lbs (68,250 Jacky)/30 days = -2,275 Jacky/d deficit. DEN = 2050 Jacky/d    Assessment  - uncontrolled  Plan   -   Doing well with her diet. Occasional issues with hunger. Recommended increase in fiber intake. More options for snacks. Planned as follows:  Patient Instructions       Breakfast -     one high protein shake                            + 20 grams of fiber.  Do this by either eating 12 tablespoons of the original, plain Fiber One cereal every day or 4 tablespoons of wheat dextrin powder (Benefiber or a generic brand) every day. Work up to this amount slowly by starting with only one-eighth to one-fourth of the target amount and then adding another one-eighth to one-fourth every one or two weeks until reaching the target. Lunch -           one high protein shake                           + one a fat snack item     Dinner -          one frozen meal                           + one snack item     Shake options (<200 chela, >25 grams/protein) :  Nectar, Pure Protein, Premier, Boost Max, Ensure Max, BeneProtein and [place] [employer] (which is lactose-free) are milk-based options; Nectar, Premier Protein Clear, IsoPure Protein Drink, and Protein 2 O are water-based options; (Premier Protein Clear, the water-based option, comes in a 20 oz bottle with 20 grams of protein and 90 calories. So you have to drink three each day which increases the cost.)  (Disclaimer: Dietary supplements rarely have their listed ingredients and the amount of each verified by a third party other. Sometimes they give verification for their claims to be GMO and gluten free and to be organic. However, even such verifications as these may still be untrustworthy.)     Fat snack options (<150 chela, >11 grams of fat): 22 almonds, 1 1/2 tablespoon of a oil-based dressing or 4 tablespoons of [place] dressing on a bed of salad greens, 1 1/2 tablespoons of peanut butter, 1 Cranberry Hurley Hublished options (<100 chela, no sweets): fruit, low fat/high protein [place] yogurt, mozzarella cheese stick, nuts, salad with dressing, peanut butter, chips/crackers/pretzels     Frozen meal options (<350 chela):  Weight Watchers Smart Ones, [employer] Cuisine, Healthy Choice, Rayna's, Rachna's     Food substitutes for the shakes:  4 oz of baked, grilled or broiled chicken, turkey or fish, 10 egg whites     Take a multivitamin daily     Walk 30 min every day or equivalent (210 min/week)    Follow up in 6-8 weeks.     Total time spent on encounter: 28 desiree Stanton MD  Obesity Medicine  2/11/2022. (Follow up questions:  1. Fatigue - feeling better, back pain is little better. Not feeling hungry most of the times. 2. Diet - has been on VLCD per plan    3. Exercise - moving more    4. Sleep - improving    5.  Medications: as per list above, does not want an appetite suppressant)

## 2022-02-15 DIAGNOSIS — D72.819 LEUKOPENIA, UNSPECIFIED TYPE: ICD-10-CM

## 2022-02-15 DIAGNOSIS — R71.8 MICROCYTOSIS: Primary | ICD-10-CM

## 2022-02-16 ENCOUNTER — HOSPITAL ENCOUNTER (OUTPATIENT)
Dept: INFUSION THERAPY | Age: 67
Discharge: HOME | End: 2022-02-16
Payer: COMMERCIAL

## 2022-02-16 DIAGNOSIS — R71.8 MICROCYTOSIS: ICD-10-CM

## 2022-02-16 DIAGNOSIS — D72.819 LEUKOPENIA, UNSPECIFIED TYPE: ICD-10-CM

## 2022-02-16 LAB
BASOPHILS ABSOLUTE: 0.05 E9/L (ref 0–0.2)
BASOPHILS RELATIVE PERCENT: 0.9 % (ref 0–2)
EOSINOPHILS ABSOLUTE: 0.26 E9/L (ref 0.05–0.5)
EOSINOPHILS RELATIVE PERCENT: 4.5 % (ref 0–6)
FERRITIN: 22 NG/ML
HCT VFR BLD CALC: 44.8 % (ref 34–48)
HEMOGLOBIN: 13.8 G/DL (ref 11.5–15.5)
IMMATURE GRANULOCYTES #: 0.01 E9/L
IMMATURE GRANULOCYTES %: 0.2 % (ref 0–5)
IRON SATURATION: 11 % (ref 15–50)
IRON: 37 MCG/DL (ref 37–145)
LYMPHOCYTES ABSOLUTE: 1.46 E9/L (ref 1.5–4)
LYMPHOCYTES RELATIVE PERCENT: 25.2 % (ref 20–42)
MCH RBC QN AUTO: 26.3 PG (ref 26–35)
MCHC RBC AUTO-ENTMCNC: 30.8 % (ref 32–34.5)
MCV RBC AUTO: 85.3 FL (ref 80–99.9)
MONOCYTES ABSOLUTE: 0.61 E9/L (ref 0.1–0.95)
MONOCYTES RELATIVE PERCENT: 10.5 % (ref 2–12)
NEUTROPHILS ABSOLUTE: 3.41 E9/L (ref 1.8–7.3)
NEUTROPHILS RELATIVE PERCENT: 58.7 % (ref 43–80)
PDW BLD-RTO: 14.6 FL (ref 11.5–15)
PLATELET # BLD: 206 E9/L (ref 130–450)
PMV BLD AUTO: 10 FL (ref 7–12)
RBC # BLD: 5.25 E12/L (ref 3.5–5.5)
TOTAL IRON BINDING CAPACITY: 352 MCG/DL (ref 250–450)
WBC # BLD: 5.8 E9/L (ref 4.5–11.5)

## 2022-02-16 PROCEDURE — 83550 IRON BINDING TEST: CPT

## 2022-02-16 PROCEDURE — 83540 ASSAY OF IRON: CPT

## 2022-02-16 PROCEDURE — 85025 COMPLETE CBC W/AUTO DIFF WBC: CPT

## 2022-02-16 PROCEDURE — 82728 ASSAY OF FERRITIN: CPT

## 2022-02-16 PROCEDURE — 36415 COLL VENOUS BLD VENIPUNCTURE: CPT

## 2022-02-17 ENCOUNTER — OFFICE VISIT (OUTPATIENT)
Dept: ONCOLOGY | Age: 67
End: 2022-02-17
Payer: COMMERCIAL

## 2022-02-17 VITALS
BODY MASS INDEX: 47.09 KG/M2 | HEIGHT: 66 IN | WEIGHT: 293 LBS | DIASTOLIC BLOOD PRESSURE: 77 MMHG | OXYGEN SATURATION: 96 % | TEMPERATURE: 97.1 F | HEART RATE: 77 BPM | SYSTOLIC BLOOD PRESSURE: 115 MMHG

## 2022-02-17 DIAGNOSIS — R71.8 MICROCYTOSIS: Primary | ICD-10-CM

## 2022-02-17 PROCEDURE — 99212 OFFICE O/P EST SF 10 MIN: CPT

## 2022-02-17 PROCEDURE — 99213 OFFICE O/P EST LOW 20 MIN: CPT | Performed by: INTERNAL MEDICINE

## 2022-02-17 NOTE — PROGRESS NOTES
[id number] [facility]  [place] [address]  Dept: [phone]  Loc: [phone]  Attending progress note      Reason for Visit:   Leukopenia, history of PE. Referring Physician: Alma Choudhury CNP    PCP:  Cassandra Spencer DO    History of Present Illness: The patient is a very pleasant 72-year-old lady,  with a PMH significant for hyperlipedemia, obesity, OA, I met the patient initially in 2016 after diagnosis with PE,she underwent on 1/29/2016 a left total hip arthroplasty, the patient was on Xarelto for about 2 weeks following her surgery. On 3/8/2016 the patient started having chest pain, shortness of breath, palpitations and lightheadedness, she was seen at the ED on 3/9/2916, had a CTA chest done revealing Extensive filling defects involve the distal central right and left pulmonary arteries, in addition to the bilateral upper and lower lobe and right middle lobe pulmonary arteries. Findings are compatible with pulmonary emboli. She had b/l LE venous ultrasounds done, were negative for DVTs. The patient was on Xarelto for 1 year, it was discontinued on 3/22/2017. No recurrent DVTs. She continues to be on baby aspirin. The patient has a history of intermittent leukopenia, her CBCD from 2/4/2021 was remarkable for a white count of 4.3, ANC 2130, hemoglobin 11.6, hematocrit 37.1, MCV 78.1, platelets 032G. The patient was donating blood about once every 8 weeks. The patient has been taking the oral iron every 2 to 3 days, tolerated this dose well overall no blood donation since her last visit. No bleeding. Review of Systems;  CONSTITUTIONAL: No fever, chills. Good appetite, positive for fatigue. Positive for weight gain. ENMT: Eyes: No diplopia; Nose: No epistaxis. Mouth: No sore throat. RESPIRATORY: No hemoptysis, positive for shortness of breath from asthma.   CARDIOVASCULAR: No chest pain, palpitations. GASTROINTESTINAL: No nausea/vomiting, abdominal pain, diarrhea/constipation. GENITOURINARY: No dysuria, urinary frequency, hematuria. NEURO: No syncope, presyncope, headache.   Remainder:  ROS NEGATIVE    Past Medical History:      Diagnosis Date    Anticoagulant long-term use 03/2016    Xarelto    Asthma     Fatigue     H/O cardiovascular stress test 03/04/2020    Hemorrhoids     Hiatal hernia     Hx of blood clots 03/2016    pulmonary embolism    Hyperlipidemia     Hypertension     Obesity     morbid    Osteoarthritis     Postoperative surgical complication involving digestive system     Wears dentures     permanent upper bridge front right tooth, next 2 on right     Patient Active Problem List   Diagnosis    Primary osteoarthritis of left hip    Acute pulmonary embolism (HCC)    Hyperlipidemia    Obesity    Family hx of colon cancer requiring screening colonoscopy    IZQUIERDO (dyspnea on exertion)    Fatigue        Past Surgical History:      Procedure Laterality Date    APPENDECTOMY      CARPAL TUNNEL RELEASE      left    COLONOSCOPY  10/26/2016    Dr. Hector Noyola, Findings: Normal Colon to the cecum - repeat in 10 years    CYST REMOVAL      EYE SURGERY Left 2016    retina tear    GASTRIC BAND  2/27/2007    Lap; 10 cm band; with hiatal hernia repair    JOINT REPLACEMENT Left 1/29/2016    total hip    JOINT REPLACEMENT Bilateral     knees    OTHER SURGICAL HISTORY  12/05/2016    laparscopic gastric band removal and hiatal hernia repair    OVARY REMOVAL Right     right due to cyst    TOTAL KNEE ARTHROPLASTY Bilateral 2012    UPPER GASTROINTESTINAL ENDOSCOPY  2007    UPPER GASTROINTESTINAL ENDOSCOPY  10/26/2016    Dr. Hector Noyola, Findings: GE Reflux, 2.5cm Hiatal Hernia with the slipped stomach up in the chest       Family History:  Family History   Problem Relation Age of Onset    Cancer Father 39        bladder    No Known Problems Mother     No Known Problems Sister    Shaheen Lainez Marital Status: Not on file   Intimate Partner Violence:     Fear of Current or Ex-Partner: Not on file    Emotionally Abused: Not on file    Physically Abused: Not on file    Sexually Abused: Not on file   Housing Stability:     Unable to Pay for Housing in the Last Year: Not on file    Number of [place] in the Last Year: Not on file    Unstable Housing in the Last Year: Not on file       Allergies:  No Known Allergies    Physical Exam:  /77   Pulse 77   Temp 97.1 °F (36.2 °C)   Ht 5' 5.75\" (1.67 m)   Wt (!) 315 lb 6.4 oz (143.1 kg)   SpO2 96%   BMI 51.29 kg/m²   GENERAL: Alert, oriented x 3, not in acute distress. HEENT: PERRLA; EOMI. Oropharynx clear. NECK: Supple. No palpable cervical or supraclavicular lymphadenopathy. LUNGS: Good air entry bilaterally. No wheezing, crackles or rhonchi. CARDIOVASCULAR: Regular rate. No murmurs, rubs or gallops. ABDOMEN: Soft. Non-tender, non-distended. Positive bowel sounds. EXTREMITIES: Without clubbing, cyanosis, or edema. NEUROLOGIC: No focal deficits. ECOG PS 1    Impression/Plan:     The patient is a very pleasant 77-year-old lady,  with a PMH significant for hyperlipedemia, obesity, OA, I met the patient initially in 2016 after diagnosis with PE,she underwent on 1/29/2016 a left total hip arthroplasty, the patient was on Xarelto for about 2 weeks following her surgery. On 3/8/2016 the patient started having chest pain, shortness of breath, palpitations and lightheadedness, she was seen at the ED on 3/9/2916, had a CTA chest done revealing Extensive filling defects involve the distal central right and left pulmonary arteries, in addition to the bilateral upper and lower lobe and right middle lobe pulmonary arteries. Findings are compatible with pulmonary emboli. She had b/l LE venous ultrasounds done, were negative for DVTs.   The patient had a provoked event by a transient surgical risk factor, given the fact that she had extensive bilateral PEs, recommended anticoagulation with Xarelto for a year, repeat chest CT was done revealing complete resolution of the PEs, Xarelto was discontinued on 3/22/2017. She was started on baby aspirin. No recurrent DVTs. She continues to be on baby aspirin. The patient had mild leukopenia, without lymphocytopenia or neutropenia, most likely was reactive,,ordered a work-up to evaluate for chronic viral infections, autoimmune etiology, and nutritional deficiencies, JOHNNA is negative, no hepatitis or HIV infections, no vitamin B12 or folate deficiency, peripheral blood flow cytometry is unremarkable. Her white count had normalized, ALC had improved. She has minimal lymphocytopenia at this time. Mild microcytic anemia, fit test was negative in March, she is up-to-date with colonoscopies. Most likely this is secondary to blood donation, the patient was started on oral iron, her hemoglobin/hematocrit had normalized, ferritin and iron are normal, TSAT is 11%, continue oral iron every 2 to 3 days as tolerated. We will monitor her CBCD and iron studies, she will have a repeat FIT test done. I discussed with Guilherme Ardon the importance of weight loss. RTC in about 3 months    Thank you for allowing us to participate in the care of Mrs. Wallace.     Kranthi Hay MD   HEMATOLOGY/MEDICAL [address] [facility] [address]  [address] [place] [address]  Dept: Malik: [phone]

## 2022-03-29 ENCOUNTER — HOSPITAL ENCOUNTER (OUTPATIENT)
Age: 67
Discharge: HOME | End: 2022-03-29
Payer: COMMERCIAL

## 2022-03-29 LAB
ALBUMIN SERPL-MCNC: 4 G/DL (ref 3.5–5.2)
ALP BLD-CCNC: 70 U/L (ref 35–104)
ALT SERPL-CCNC: 26 U/L (ref 0–32)
ANION GAP SERPL CALCULATED.3IONS-SCNC: 9 MMOL/L (ref 7–16)
AST SERPL-CCNC: 22 U/L (ref 0–31)
BASOPHILS ABSOLUTE: 0.03 E9/L (ref 0–0.2)
BASOPHILS RELATIVE PERCENT: 0.7 % (ref 0–2)
BILIRUB SERPL-MCNC: 0.3 MG/DL (ref 0–1.2)
BUN BLDV-MCNC: 24 MG/DL (ref 6–23)
CALCIUM SERPL-MCNC: 9.2 MG/DL (ref 8.6–10.2)
CHLORIDE BLD-SCNC: 104 MMOL/L (ref 98–107)
CHOLESTEROL, TOTAL: 106 MG/DL (ref 0–199)
CO2: 27 MMOL/L (ref 22–29)
CREAT SERPL-MCNC: 0.9 MG/DL (ref 0.5–1)
EOSINOPHILS ABSOLUTE: 0.19 E9/L (ref 0.05–0.5)
EOSINOPHILS RELATIVE PERCENT: 4.2 % (ref 0–6)
GFR AFRICAN AMERICAN: >60
GFR NON-AFRICAN AMERICAN: >60 ML/MIN/1.73
GLUCOSE BLD-MCNC: 127 MG/DL (ref 74–99)
HBA1C MFR BLD: 6.1 % (ref 4–5.6)
HCT VFR BLD CALC: 43.5 % (ref 34–48)
HDLC SERPL-MCNC: 43 MG/DL
HEMOGLOBIN: 13.8 G/DL (ref 11.5–15.5)
IMMATURE GRANULOCYTES #: 0.01 E9/L
IMMATURE GRANULOCYTES %: 0.2 % (ref 0–5)
LDL CHOLESTEROL CALCULATED: 46 MG/DL (ref 0–99)
LYMPHOCYTES ABSOLUTE: 1.33 E9/L (ref 1.5–4)
LYMPHOCYTES RELATIVE PERCENT: 29.6 % (ref 20–42)
MCH RBC QN AUTO: 27.1 PG (ref 26–35)
MCHC RBC AUTO-ENTMCNC: 31.7 % (ref 32–34.5)
MCV RBC AUTO: 85.3 FL (ref 80–99.9)
MONOCYTES ABSOLUTE: 0.42 E9/L (ref 0.1–0.95)
MONOCYTES RELATIVE PERCENT: 9.4 % (ref 2–12)
NEUTROPHILS ABSOLUTE: 2.51 E9/L (ref 1.8–7.3)
NEUTROPHILS RELATIVE PERCENT: 55.9 % (ref 43–80)
PDW BLD-RTO: 14.1 FL (ref 11.5–15)
PLATELET # BLD: 204 E9/L (ref 130–450)
PMV BLD AUTO: 9.9 FL (ref 7–12)
POTASSIUM SERPL-SCNC: 4.5 MMOL/L (ref 3.5–5)
RBC # BLD: 5.1 E12/L (ref 3.5–5.5)
SODIUM BLD-SCNC: 140 MMOL/L (ref 132–146)
T4 FREE: 1.23 NG/DL (ref 0.93–1.7)
TOTAL PROTEIN: 6.4 G/DL (ref 6.4–8.3)
TRIGL SERPL-MCNC: 85 MG/DL (ref 0–149)
TSH SERPL DL<=0.05 MIU/L-ACNC: 1.26 UIU/ML (ref 0.27–4.2)
VLDLC SERPL CALC-MCNC: 17 MG/DL
WBC # BLD: 4.5 E9/L (ref 4.5–11.5)

## 2022-03-29 PROCEDURE — 36415 COLL VENOUS BLD VENIPUNCTURE: CPT

## 2022-03-29 PROCEDURE — 84443 ASSAY THYROID STIM HORMONE: CPT

## 2022-03-29 PROCEDURE — 80053 COMPREHEN METABOLIC PANEL: CPT

## 2022-03-29 PROCEDURE — 80061 LIPID PANEL: CPT

## 2022-03-29 PROCEDURE — 84439 ASSAY OF FREE THYROXINE: CPT

## 2022-03-29 PROCEDURE — 83036 HEMOGLOBIN GLYCOSYLATED A1C: CPT

## 2022-03-29 PROCEDURE — 85025 COMPLETE CBC W/AUTO DIFF WBC: CPT

## 2022-04-08 ENCOUNTER — OFFICE VISIT (OUTPATIENT)
Dept: BARIATRICS/WEIGHT MGMT | Age: 67
End: 2022-04-08
Payer: COMMERCIAL

## 2022-04-08 VITALS
HEART RATE: 74 BPM | WEIGHT: 293 LBS | TEMPERATURE: 97.5 F | SYSTOLIC BLOOD PRESSURE: 117 MMHG | HEIGHT: 66 IN | BODY MASS INDEX: 47.09 KG/M2 | DIASTOLIC BLOOD PRESSURE: 76 MMHG

## 2022-04-08 DIAGNOSIS — R53.82 CHRONIC FATIGUE: Primary | ICD-10-CM

## 2022-04-08 DIAGNOSIS — E66.01 CLASS 3 SEVERE OBESITY DUE TO EXCESS CALORIES WITH SERIOUS COMORBIDITY AND BODY MASS INDEX (BMI) OF 45.0 TO 49.9 IN ADULT (HCC): ICD-10-CM

## 2022-04-08 PROCEDURE — 99213 OFFICE O/P EST LOW 20 MIN: CPT | Performed by: INTERNAL MEDICINE

## 2022-04-08 PROCEDURE — 99211 OFF/OP EST MAY X REQ PHY/QHP: CPT

## 2022-04-08 NOTE — PROGRESS NOTES
CC -   Follow up of: Fatigue all the time over the last 2 years, Weight gain, exercise intolerance. BACKGROUND -   Last visit: 2/11/22  First visit: 1/12/22   Obesity   Began in childhood  Initial BMI 53.7, Wt 330.2 lbs  HS Grad wt 200 lbs  Lowest   wt 160 lbs (in high school)  Highest  wt 330 lbs  Pattern of wt gain: gradual, but has accelerated since 2020, attributes to decreased activity due to pain  Wt change past yr: +50 lbs  Most wt lost: about 50 lbs (after lap band 2007)  Other diets attempted: Had lap band that was removed after it slipped in 2016, otherwise no specific program, but tried to watch diet    Desire to lose weight: 9/10  Problem posed by appetite: 4/10    Initial Diet:    Number of meals per day - 3    Number of snacks per day - 1-2    Meal volume - 12\" plate, occasionally seconds    Fast food/convenience store - 1x/week    Restaurants (not fast food) - 0-1x/week   Sweets - 1d/week (candy bars or cakes)   Chips - 1d/week   Crackers/pretzels - 0d/week   Nuts - 0d/week   Peanut Butter - 0d/week   Popcorn - 1d/week   Dried fruit - 0d/week   Whole fruit - 3d/week -  - bananas/oranges/grapes   Breakfast cereal - 0d/week   Granola/Protein/Energy bar - 0d/week   Sugar sweetened beverages - tea with 1 teaspoon of sugar daily   Protein - No supplements   Fiber - No supplements     Initial Exercise:    Gym membership - [employer]    Walking - no (pain, does some activity with dogs)    Running - no    Resistance - no    Aerobic class - no        Initial Sleep: bedtime: 9-9:30 pm, wakes up at 3 and then falls back to sleep and wakes up at 6 am, wakes up tired. More awake in the morning, no daytime naps. Follow up 2/11/22:  1. Fatigue - feeling better, back pain is little better. Not feeling hungry most of the times. 2. Diet - has been on VLCD per plan    3. Exercise - moving more    4. Sleep - improving    5.  Medications: as per list, does not want an appetite suppressant    Follow up 4/8/22:  1. Fatigue: not really fatigued or tired, but some tiredness in the evening after work. 2. Diet: Doing well with VLCD plan, not taking fiber supplement other than granola. Drinks about 50-60 oz currently. 3. Exercise: not doing much, but walks more than prior - back is not hurting as much. 4. Sleep: sleep similar but not as tired as before, not taking daytime naps.     5. Medications: Valsartan-hctz was increased to 160-12.5.     ______________________    [facility] -    Past Medical History:   Diagnosis Date    Anticoagulant long-term use 03/2016    Xarelto    Asthma     Fatigue     H/O cardiovascular stress test 03/04/2020    Hemorrhoids     Hiatal hernia     Hx of blood clots 03/2016    pulmonary embolism    Hyperlipidemia     Hypertension     Obesity     morbid    Osteoarthritis     Postoperative surgical complication involving digestive system     Wears dentures     permanent upper bridge front right tooth, next 2 on right     Past Surgical History:   Procedure Laterality Date    APPENDECTOMY      CARPAL TUNNEL RELEASE      left    COLONOSCOPY  10/26/2016    Dr. Tamia Jones, Findings: Normal Colon to the cecum - repeat in 10 years    CYST REMOVAL      EYE SURGERY Left 2016    retina tear    GASTRIC BAND  2/27/2007    Lap; 10 cm band; with hiatal hernia repair    JOINT REPLACEMENT Left 1/29/2016    total hip    JOINT REPLACEMENT Bilateral     knees    OTHER SURGICAL HISTORY  12/05/2016    laparscopic gastric band removal and hiatal hernia repair    OVARY REMOVAL Right     right due to cyst    TOTAL KNEE ARTHROPLASTY Bilateral 2012    UPPER GASTROINTESTINAL ENDOSCOPY  2007    UPPER GASTROINTESTINAL ENDOSCOPY  10/26/2016    Dr. Tamia Jones, Findings: GE Reflux, 2.5cm Hiatal Hernia with the slipped stomach up in the chest       Current Outpatient Medications   Medication Sig Dispense Refill    VALSARTAN-HYDROCHLOROTHIAZIDE PO       famotidine (PEPCID) 20 MG tablet Take 20 mg by mouth daily      ferrous sulfate (IRON 325) 325 (65 Fe) MG tablet Take 1 tablet by mouth daily (with breakfast) 60 tablet 1    metFORMIN (GLUCOPHAGE-XR) 500 MG extended release tablet Take 500 mg by mouth Daily with supper      metoprolol tartrate (LOPRESSOR) 25 MG tablet Take 1 tablet by mouth 2 times daily 60 tablet 9    aspirin 81 MG EC tablet Take 1 tablet by mouth daily      nitroGLYCERIN (NITROSTAT) 0.4 MG SL tablet up to max of 3 total doses. If no relief after 1 dose, call 911. 25 tablet 1    naproxen sodium (ALEVE) 220 MG tablet Take 220 mg by mouth 2 times daily as needed for Pain      atorvastatin (LIPITOR) 20 MG tablet Take 20 mg by mouth nightly      acetaminophen (TYLENOL) 500 MG tablet Take 500 mg by mouth every 6 hours as needed for Pain      Multiple Vitamins-Calcium (ONE-A-DAY WOMENS PO) Take 1 tablet by mouth daily      Cholecalciferol 4000 UNITS CAPS Take 1 capsule by mouth daily       No current facility-administered medications for this visit. Metformin 500 bid  Valsartan + Metoprolol for HTN      Family: no family history of DM or heart disease. No known allergies. Never smoked, rare alcohol. 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 Heart: s1 s2 rrr, no murmur noted, no pedal edema noted  Psych: Normal mood   Full affect  Neuro: Moves all ext well  ______________________    HISTORY & ASSESSMENT/PLAN -     Problem 1  - Fatigue   HPI   - chronic, not as tired as before, still some tiredness in the evening. Assessment  - improving gradually  Plan   - Weight reduction can help. Weight reduction per plan below.      Problem 2  - Obesity   HPI   - See above Background for description    Weight  Date    330.2 lbs 01/12/22    310.8 lbs 2/11/22    295.8  4/8/22  Total weight change to date: -34.4 lbs  Average daily energy variance:   01/12/2022 - 2/11/2022: -19.5 lbs (68,250 Jacky)/30 days = -2,275 Jacky/d deficit. 2/11/2022 - 4/8/2022: -15 lbs (08754 Jacky)/55 days = -955 Jacky/day deficit. DEN = 2050 Jacky/d    Assessment  - improving  Plan   - She is doing very well with her diet plan. She can take extra snack in the evening if she is uncomfortable with hunger. Does not want an appetite suppressant. Increase water intake. Would like to continue current plan. Follow up in 6-8 weeks. She does get lightheaded gregorio in the morning. BP here is much better controlled, but concern for hypotension. Talked about possibly decreasing Valsartan-hctz to half. Recent labworks reviewed, hba1c is down to 6.1 and currently she is on Metformin 500 which she can continue. Lipid panel is wnl, may be able to decrease lipitor, she will talk to Dr. Tootie Martinez. Total time spent on encounter: 22 min. Princess Alberta MD  Obesity Medicine  4/8/2022.

## 2022-05-18 ENCOUNTER — HOSPITAL ENCOUNTER (OUTPATIENT)
Dept: INFUSION THERAPY | Age: 67
Discharge: HOME | End: 2022-05-18
Payer: COMMERCIAL

## 2022-05-18 DIAGNOSIS — D72.819 LEUKOPENIA, UNSPECIFIED TYPE: ICD-10-CM

## 2022-05-18 DIAGNOSIS — R71.8 MICROCYTOSIS: ICD-10-CM

## 2022-05-18 LAB
BASOPHILS ABSOLUTE: 0.04 E9/L (ref 0–0.2)
BASOPHILS RELATIVE PERCENT: 0.7 % (ref 0–2)
EOSINOPHILS ABSOLUTE: 0.18 E9/L (ref 0.05–0.5)
EOSINOPHILS RELATIVE PERCENT: 3 % (ref 0–6)
FERRITIN: 49 NG/ML
HCT VFR BLD CALC: 43.6 % (ref 34–48)
HEMOGLOBIN: 14.3 G/DL (ref 11.5–15.5)
IMMATURE GRANULOCYTES #: 0.03 E9/L
IMMATURE GRANULOCYTES %: 0.5 % (ref 0–5)
IRON SATURATION: 29 % (ref 15–50)
IRON: 95 MCG/DL (ref 37–145)
LYMPHOCYTES ABSOLUTE: 1.25 E9/L (ref 1.5–4)
LYMPHOCYTES RELATIVE PERCENT: 20.8 % (ref 20–42)
MCH RBC QN AUTO: 29 PG (ref 26–35)
MCHC RBC AUTO-ENTMCNC: 32.8 % (ref 32–34.5)
MCV RBC AUTO: 88.4 FL (ref 80–99.9)
MONOCYTES ABSOLUTE: 0.48 E9/L (ref 0.1–0.95)
MONOCYTES RELATIVE PERCENT: 8 % (ref 2–12)
NEUTROPHILS ABSOLUTE: 4.03 E9/L (ref 1.8–7.3)
NEUTROPHILS RELATIVE PERCENT: 67 % (ref 43–80)
PDW BLD-RTO: 12.9 FL (ref 11.5–15)
PLATELET # BLD: 207 E9/L (ref 130–450)
PMV BLD AUTO: 9.8 FL (ref 7–12)
RBC # BLD: 4.93 E12/L (ref 3.5–5.5)
TOTAL IRON BINDING CAPACITY: 331 MCG/DL (ref 250–450)
WBC # BLD: 6 E9/L (ref 4.5–11.5)

## 2022-05-18 PROCEDURE — 83540 ASSAY OF IRON: CPT

## 2022-05-18 PROCEDURE — 83550 IRON BINDING TEST: CPT

## 2022-05-18 PROCEDURE — 36415 COLL VENOUS BLD VENIPUNCTURE: CPT

## 2022-05-18 PROCEDURE — 82728 ASSAY OF FERRITIN: CPT

## 2022-05-18 PROCEDURE — 85025 COMPLETE CBC W/AUTO DIFF WBC: CPT

## 2022-05-19 ENCOUNTER — OFFICE VISIT (OUTPATIENT)
Dept: ONCOLOGY | Age: 67
End: 2022-05-19
Payer: COMMERCIAL

## 2022-05-19 VITALS
SYSTOLIC BLOOD PRESSURE: 117 MMHG | HEART RATE: 69 BPM | BODY MASS INDEX: 46.28 KG/M2 | HEIGHT: 66 IN | DIASTOLIC BLOOD PRESSURE: 82 MMHG | WEIGHT: 288 LBS | OXYGEN SATURATION: 97 % | TEMPERATURE: 97.6 F

## 2022-05-19 DIAGNOSIS — I26.99 OTHER ACUTE PULMONARY EMBOLISM, UNSPECIFIED WHETHER ACUTE COR PULMONALE PRESENT (HCC): ICD-10-CM

## 2022-05-19 DIAGNOSIS — D72.819 LEUKOPENIA, UNSPECIFIED TYPE: ICD-10-CM

## 2022-05-19 DIAGNOSIS — R71.8 MICROCYTOSIS: Primary | ICD-10-CM

## 2022-05-19 PROCEDURE — 99214 OFFICE O/P EST MOD 30 MIN: CPT | Performed by: INTERNAL MEDICINE

## 2022-05-19 PROCEDURE — 99212 OFFICE O/P EST SF 10 MIN: CPT

## 2022-05-19 NOTE — PROGRESS NOTES
[id number] [facility]  [place] [address]  Dept: [phone]  Loc: [phone]  Attending progress note      Reason for Visit:   Leukopenia, history of PE. Referring Physician: Aman Lucas CNP    PCP:  Madiha Santana DO    History of Present Illness: The patient is a very pleasant 82-year-old lady,  with a PMH significant for hyperlipedemia, obesity, OA, I met the patient initially in 2016 after diagnosis with PE,she underwent on 1/29/2016 a left total hip arthroplasty, the patient was on Xarelto for about 2 weeks following her surgery. On 3/8/2016 the patient started having chest pain, shortness of breath, palpitations and lightheadedness, she was seen at the ED on 3/9/2916, had a CTA chest done revealing Extensive filling defects involve the distal central right and left pulmonary arteries, in addition to the bilateral upper and lower lobe and right middle lobe pulmonary arteries. Findings are compatible with pulmonary emboli. She had b/l LE venous ultrasounds done, were negative for DVTs. The patient was on Xarelto for 1 year, it was discontinued on 3/22/2017. No recurrent DVTs. She continues to be on baby aspirin. The patient has a history of intermittent leukopenia, her CBCD from 2/4/2021 was remarkable for a white count of 4.3, ANC 2130, hemoglobin 11.6, hematocrit 37.1, MCV 78.1, platelets 501A. The patient was donating blood about once every 8 weeks. The patient has been taking the oral iron every 2 to 3 days, tolerating this dose well, no blood donation since her last visit, she is working on weight loss. No bleeding. Review of Systems;  CONSTITUTIONAL: No fever, chills. Good appetite, positive for fatigue. Positive for weight gain. ENMT: Eyes: No diplopia; Nose: No epistaxis. Mouth: No sore throat. RESPIRATORY: No hemoptysis, positive for shortness of breath from asthma.   CARDIOVASCULAR: No chest pain, palpitations. GASTROINTESTINAL: No nausea/vomiting, abdominal pain, diarrhea/constipation. GENITOURINARY: No dysuria, urinary frequency, hematuria. NEURO: No syncope, presyncope, headache.   Remainder:  ROS NEGATIVE    Past Medical History:      Diagnosis Date    Anticoagulant long-term use 03/2016    Xarelto    Asthma     Fatigue     H/O cardiovascular stress test 03/04/2020    Hemorrhoids     Hiatal hernia     Hx of blood clots 03/2016    pulmonary embolism    Hyperlipidemia     Hypertension     Obesity     morbid    Osteoarthritis     Postoperative surgical complication involving digestive system     Wears dentures     permanent upper bridge front right tooth, next 2 on right     Patient Active Problem List   Diagnosis    Primary osteoarthritis of left hip    Acute pulmonary embolism (HCC)    Hyperlipidemia    Obesity    Family hx of colon cancer requiring screening colonoscopy    IZQUIERDO (dyspnea on exertion)    Fatigue        Past Surgical History:      Procedure Laterality Date    APPENDECTOMY      CARPAL TUNNEL RELEASE      left    COLONOSCOPY  10/26/2016    Dr. Estefani Silva, Findings: Normal Colon to the cecum - repeat in 10 years    CYST REMOVAL      EYE SURGERY Left 2016    retina tear    GASTRIC BAND  2/27/2007    Lap; 10 cm band; with hiatal hernia repair    JOINT REPLACEMENT Left 1/29/2016    total hip    JOINT REPLACEMENT Bilateral     knees    OTHER SURGICAL HISTORY  12/05/2016    laparscopic gastric band removal and hiatal hernia repair    OVARY REMOVAL Right     right due to cyst    TOTAL KNEE ARTHROPLASTY Bilateral 2012    UPPER GASTROINTESTINAL ENDOSCOPY  2007    UPPER GASTROINTESTINAL ENDOSCOPY  10/26/2016    Dr. Estefani Silva, Findings: GE Reflux, 2.5cm Hiatal Hernia with the slipped stomach up in the chest       Family History:  Family History   Problem Relation Age of Onset    Cancer Father 39        bladder    No Known Problems Mother     No Known Problems Sister     No Known Problems Brother     No Known Problems Maternal Aunt     No Known Problems Maternal Uncle     Cancer Paternal Aunt         lung    No Known Problems Paternal Uncle     No Known Problems Maternal Grandmother     No Known Problems Maternal Grandfather     No Known Problems Paternal Grandmother     No Known Problems Paternal Grandfather     Mult Sclerosis Paternal Cousin        Medications:  Reviewed and reconciled. Social History:  Social History     Socioeconomic History    Marital status: Single     Spouse name: Not on file    Number of children: Not on file    Years of education: Not on file    Highest education level: Not on file   Occupational History    Not on file   Tobacco Use    Smoking status: Never Smoker    Smokeless tobacco: Never Used   Vaping Use    Vaping Use: Never used   Substance and Sexual Activity    Alcohol use: No     Comment: 1 to 2 times per year. Hot tea andice tea 1 cup a day.  Drug use: No    Sexual activity: Never   Other Topics Concern    Not on file   Social History Narrative    Not on file     Social Determinants of Health     Financial Resource Strain:     Difficulty of Paying Living Expenses: Not on file   Food Insecurity:     Worried About Running Out of Food in the Last Year: Not on file    Elizabeth of Food in the Last Year: Not on file   Transportation Needs:     Lack of Transportation (Medical): Not on file    Lack of Transportation (Non-Medical):  Not on file   Physical Activity:     Days of Exercise per Week: Not on file    Minutes of Exercise per Session: Not on file   Stress:     Feeling of Stress : Not on file   Social Connections:     Frequency of Communication with Friends and Family: Not on file    Frequency of Social Gatherings with Friends and Family: Not on file    Attends Restorationist Services: Not on file    Active Member of Clubs or Organizations: Not on file    Attends Club or Organization Meetings: Not on file    Marital Status: Not on file   Intimate Partner Violence:     Fear of Current or Ex-Partner: Not on file    Emotionally Abused: Not on file    Physically Abused: Not on file    Sexually Abused: Not on file   Housing Stability:     Unable to Pay for Housing in the Last Year: Not on file    Number of [place] in the Last Year: Not on file    Unstable Housing in the Last Year: Not on file       Allergies:  No Known Allergies    Physical Exam:  /82   Pulse 69   Temp 97.6 °F (36.4 °C)   Ht 5' 6\" (1.676 m)   Wt 288 lb (130.6 kg)   SpO2 97%   BMI 46.48 kg/m²   GENERAL: Alert, oriented x 3, not in acute distress. HEENT: PERRLA; EOMI. Oropharynx clear. NECK: Supple. No palpable cervical or supraclavicular lymphadenopathy. LUNGS: Good air entry bilaterally. No wheezing, crackles or rhonchi. CARDIOVASCULAR: Regular rate. No murmurs, rubs or gallops. ABDOMEN: Soft. Non-tender, non-distended. Positive bowel sounds. EXTREMITIES: Without clubbing, cyanosis, or edema. NEUROLOGIC: No focal deficits. ECOG PS 1    Impression/Plan:     The patient is a very pleasant 73-year-old lady,  with a PMH significant for hyperlipedemia, obesity, OA, I met the patient initially in 2016 after diagnosis with PE,she underwent on 1/29/2016 a left total hip arthroplasty, the patient was on Xarelto for about 2 weeks following her surgery. On 3/8/2016 the patient started having chest pain, shortness of breath, palpitations and lightheadedness, she was seen at the ED on 3/9/2916, had a CTA chest done revealing Extensive filling defects involve the distal central right and left pulmonary arteries, in addition to the bilateral upper and lower lobe and right middle lobe pulmonary arteries. Findings are compatible with pulmonary emboli. She had b/l LE venous ultrasounds done, were negative for DVTs.   The patient had a provoked event by a transient surgical risk factor, given the fact that she had extensive bilateral PEs, recommended anticoagulation with Xarelto for a year, repeat chest CT was done revealing complete resolution of the PEs, Xarelto was discontinued on 3/22/2017. She was started on baby aspirin. No recurrent DVTs. She continues to be on baby aspirin. I encouraged her to continue with the weight loss. The patient had mild leukopenia, without lymphocytopenia or neutropenia, most likely was reactive,,ordered a work-up to evaluate for chronic viral infections, autoimmune etiology, and nutritional deficiencies, JOHNNA is negative, no hepatitis or HIV infections, no vitamin B12 or folate deficiency, peripheral blood flow cytometry is unremarkable. Her white count had normalized, ALC had improved. She has minimal lymphocytopenia at this time. Mild microcytic anemia, fit test was negative in March, she is up-to-date with colonoscopies. Most likely this is secondary to blood donation, the patient was started on oral iron, her hemoglobin/hematocrit had normalized, iron panel is normal, recommend to discontinue the oral iron. We will monitor her CBCD and iron studies, repeat fit test has been ordered. I discussed with Louie Caraballo the importance of weight loss. RTC in about 3 months    Thank you for allowing us to participate in the care of Mrs. Wallace.     Luisa Uriostegui MD   HEMATOLOGY/MEDICAL [address] [address] [address]  [address] [address] [address] [place] [address]  Dept: Malik: [phone]

## 2022-06-24 ENCOUNTER — OFFICE VISIT (OUTPATIENT)
Dept: BARIATRICS/WEIGHT MGMT | Age: 67
End: 2022-06-24
Payer: COMMERCIAL

## 2022-06-24 VITALS
HEIGHT: 66 IN | TEMPERATURE: 97.6 F | BODY MASS INDEX: 44.71 KG/M2 | SYSTOLIC BLOOD PRESSURE: 108 MMHG | WEIGHT: 278.2 LBS | DIASTOLIC BLOOD PRESSURE: 57 MMHG | HEART RATE: 75 BPM

## 2022-06-24 DIAGNOSIS — I10 ESSENTIAL HYPERTENSION: ICD-10-CM

## 2022-06-24 DIAGNOSIS — R53.82 CHRONIC FATIGUE: Primary | ICD-10-CM

## 2022-06-24 DIAGNOSIS — E78.5 HYPERLIPIDEMIA, UNSPECIFIED HYPERLIPIDEMIA TYPE: ICD-10-CM

## 2022-06-24 DIAGNOSIS — E66.01 CLASS 3 SEVERE OBESITY DUE TO EXCESS CALORIES WITH SERIOUS COMORBIDITY AND BODY MASS INDEX (BMI) OF 45.0 TO 49.9 IN ADULT (HCC): ICD-10-CM

## 2022-06-24 PROCEDURE — 99211 OFF/OP EST MAY X REQ PHY/QHP: CPT

## 2022-06-24 PROCEDURE — 99213 OFFICE O/P EST LOW 20 MIN: CPT | Performed by: INTERNAL MEDICINE

## 2022-06-24 NOTE — PROGRESS NOTES
CC -   Follow up of: Fatigue all the time over the last 2 years, Weight gain, exercise intolerance. BACKGROUND -   Last visit: 4/8/22  First visit: 1/12/22   Obesity   Began in childhood  Initial BMI 53.7, Wt 330.2 lbs  HS Grad wt 200 lbs  Lowest   wt 160 lbs (in high school)  Highest  wt 330 lbs  Pattern of wt gain: gradual, but has accelerated since 2020, attributes to decreased activity due to pain  Wt change past yr: +50 lbs  Most wt lost: about 50 lbs (after lap band 2007)  Other diets attempted: Had lap band that was removed after it slipped in 2016, otherwise no specific program, but tried to watch diet    Desire to lose weight: 9/10  Problem posed by appetite: 4/10    Initial Diet:    Number of meals per day - 3    Number of snacks per day - 1-2    Meal volume - 12\" plate, occasionally seconds    Fast food/convenience store - 1x/week    Restaurants (not fast food) - 0-1x/week   Sweets - 1d/week (candy bars or cakes)   Chips - 1d/week   Crackers/pretzels - 0d/week   Nuts - 0d/week   Peanut Butter - 0d/week   Popcorn - 1d/week   Dried fruit - 0d/week   Whole fruit - 3d/week -  - bananas/oranges/grapes   Breakfast cereal - 0d/week   Granola/Protein/Energy bar - 0d/week   Sugar sweetened beverages - tea with 1 teaspoon of sugar daily   Protein - No supplements   Fiber - No supplements     Initial Exercise:    Gym membership - [employer]    Walking - no (pain, does some activity with dogs)    Running - no    Resistance - no    Aerobic class - no        Initial Sleep: bedtime: 9-9:30 pm, wakes up at 3 and then falls back to sleep and wakes up at 6 am, wakes up tired. More awake in the morning, no daytime naps. Follow up 2/11/22:  1. Fatigue - feeling better, back pain is little better. Not feeling hungry most of the times. 2. Diet - has been on VLCD per plan    3. Exercise - moving more    4. Sleep - improving    5.  Medications: as per list, does not want an appetite suppressant    Follow up 4/8/22:  1. Fatigue: not really fatigued or tired, but some tiredness in the evening after work. 2. Diet: Doing well with VLCD plan, not taking fiber supplement other than granola. Drinks about 50-60 oz currently. 3. Exercise: not doing much, but walks more than prior - back is not hurting as much. 4. Sleep: sleep similar but not as tired as before, not taking daytime naps. 5. Medications: Valsartan-hctz was increased to 160-12.5. Follow up 6/24/22:  1. Fatigue: some fatigue, had covid, but better now. 2. Diet: VCLD, protein shakes in am, frozen meals at lunch and low supper. Fruits++, chicken/fish for supper and vegetable. Cauliflower. Takes fiber one cereal - bought it and eats it at work sometimes. Water ~60 oz/day just water + other drinks. 3. Exercise: not walking as much d/t hip bursitis. But walks during lunch hour at work. 4. Sleep: sleeping well, no daytime naps. 5. Medications: Not taking any appetite suppressants. Other medications as mentioned below.     ______________________    [facility] -    Past Medical History:   Diagnosis Date    Anticoagulant long-term use 03/2016    Xarelto    Asthma     Fatigue     H/O cardiovascular stress test 03/04/2020    Hemorrhoids     Hiatal hernia     Hx of blood clots 03/2016    pulmonary embolism    Hyperlipidemia     Hypertension     Obesity     morbid    Osteoarthritis     Postoperative surgical complication involving digestive system     Wears dentures     permanent upper bridge front right tooth, next 2 on right     Past Surgical History:   Procedure Laterality Date    APPENDECTOMY      CARPAL TUNNEL RELEASE      left    COLONOSCOPY  10/26/2016    Dr. Norberto Soto, Findings: Normal Colon to the cecum - repeat in 10 years    CYST REMOVAL      EYE SURGERY Left 2016    retina tear    GASTRIC BAND  2/27/2007    Lap; 10 cm band; with hiatal hernia repair    JOINT REPLACEMENT Left 1/29/2016    total hip    JOINT REPLACEMENT Bilateral     knees    OTHER SURGICAL HISTORY  12/05/2016    laparscopic gastric band removal and hiatal hernia repair    OVARY REMOVAL Right     right due to cyst    TOTAL KNEE ARTHROPLASTY Bilateral 2012    UPPER GASTROINTESTINAL ENDOSCOPY  2007    UPPER GASTROINTESTINAL ENDOSCOPY  10/26/2016    Dr. Estefani Silva, Findings: GE Reflux, 2.5cm Hiatal Hernia with the slipped stomach up in the chest       Current Outpatient Medications   Medication Sig Dispense Refill    VALSARTAN-HYDROCHLOROTHIAZIDE PO       famotidine (PEPCID) 20 MG tablet Take 20 mg by mouth daily      ferrous sulfate (IRON 325) 325 (65 Fe) MG tablet Take 1 tablet by mouth daily (with breakfast) 60 tablet 1    metFORMIN (GLUCOPHAGE-XR) 500 MG extended release tablet Take 500 mg by mouth Daily with supper      metoprolol tartrate (LOPRESSOR) 25 MG tablet Take 1 tablet by mouth 2 times daily 60 tablet 9    aspirin 81 MG EC tablet Take 1 tablet by mouth daily      nitroGLYCERIN (NITROSTAT) 0.4 MG SL tablet up to max of 3 total doses. If no relief after 1 dose, call 911. 25 tablet 1    naproxen sodium (ALEVE) 220 MG tablet Take 220 mg by mouth 2 times daily as needed for Pain      atorvastatin (LIPITOR) 20 MG tablet Take 20 mg by mouth nightly      acetaminophen (TYLENOL) 500 MG tablet Take 500 mg by mouth every 6 hours as needed for Pain      Multiple Vitamins-Calcium (ONE-A-DAY WOMENS PO) Take 1 tablet by mouth daily      Cholecalciferol 4000 UNITS CAPS Take 1 capsule by mouth daily       No current facility-administered medications for this visit. Metformin 500 bid  Valsartan + Metoprolol for HTN    Family: no family history of DM or heart disease. No known allergies. Never smoked, rare alcohol. 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 Secretarial work.     ROS -  Card - no CP  GI - no N/V/D/C  On and off depression    PE -  Gen : BP (!) 108/57 (Site: Left Upper Arm, Position: Sitting, Cuff Size: Thigh)   Pulse 75   Temp 97.6 °F (36.4 °C) (Temporal)   Ht 5' 5.75\" (1.67 m)   Wt 278 lb 3.2 oz (126.2 kg)   BMI 45.24 kg/m²    WN, WD, NAD  Lung: Nml resp effort, CTA b/l. Heart: s1 s2 rrr, no murmur noted, no pedal edema noted  Psych: Normal mood   Full affect  Neuro: Moves all ext well  ______________________    HISTORY & ASSESSMENT/PLAN -     Problem 1  - Fatigue   HPI   - chronic, not as tired as before, still some tiredness in the evening. Assessment  - improving gradually  Plan   - Weight reduction can help. Weight reduction per plan below. Problem 2  - Obesity   HPI   - See above Background for description    Weight  Date    330.2 lbs 01/12/22    310.8 lbs 2/11/22    295.8  4/8/22    278.2  6/24/22    Total weight change to date: -52.0 lbs  Average daily energy variance:   01/12/2022 - 2/11/2022: -19.5 lbs (68,250 Jacky)/30 days = -2,275 Jacky/d deficit. 2/11/2022 - 4/8/2022: -15 lbs (17312 Jacky)/55 days = -955 Jacky/day deficit. 4/8/2022 - 6/24/2022: -17.6 lbs (09176 Jacky)/76 days = -811 Jacky/day deficit. DEN = 2050 Jacky/d    Assessment  - improving  Plan   - She is doing very well with her diet plan. She can take extra snack in the evening if she is uncomfortable with hunger. Does not want an appetite suppressant. Continue current plan. Follow up in 6-8 weeks. She does get lightheaded gregorio in the morning. BP here is much better controlled, but concern for hypotension. Also c/o muscle aches. She has appt with Dr. Gladis Chiu next week. She will talk about her BP medication and lipitor (?decrease dose). Total time spent on encounter: 21 min. Brittnee Kirkland MD  Obesity Medicine  6/24/2022.

## 2022-07-21 ENCOUNTER — HOSPITAL ENCOUNTER (OUTPATIENT)
Dept: MAMMOGRAPHY | Age: 67
Discharge: HOME | End: 2022-07-23
Payer: COMMERCIAL

## 2022-07-21 VITALS — BODY MASS INDEX: 44.68 KG/M2 | WEIGHT: 278 LBS | HEIGHT: 66 IN

## 2022-07-21 DIAGNOSIS — Z12.31 OTHER SCREENING MAMMOGRAM: ICD-10-CM

## 2022-07-21 PROCEDURE — 77063 BREAST TOMOSYNTHESIS BI: CPT

## 2022-08-17 ENCOUNTER — HOSPITAL ENCOUNTER (OUTPATIENT)
Dept: INFUSION THERAPY | Age: 67
Discharge: HOME | End: 2022-08-17
Payer: COMMERCIAL

## 2022-08-17 DIAGNOSIS — R71.8 MICROCYTOSIS: ICD-10-CM

## 2022-08-17 DIAGNOSIS — D72.819 LEUKOPENIA, UNSPECIFIED TYPE: ICD-10-CM

## 2022-08-17 LAB
BASOPHILS ABSOLUTE: 0.03 E9/L (ref 0–0.2)
BASOPHILS RELATIVE PERCENT: 0.7 % (ref 0–2)
EOSINOPHILS ABSOLUTE: 0.22 E9/L (ref 0.05–0.5)
EOSINOPHILS RELATIVE PERCENT: 4.9 % (ref 0–6)
FERRITIN: 44 NG/ML
HCT VFR BLD CALC: 43.5 % (ref 34–48)
HEMOGLOBIN: 14.3 G/DL (ref 11.5–15.5)
IMMATURE GRANULOCYTES #: 0 E9/L
IMMATURE GRANULOCYTES %: 0 % (ref 0–5)
IRON SATURATION: 26 % (ref 15–50)
IRON: 78 MCG/DL (ref 37–145)
LYMPHOCYTES ABSOLUTE: 1.38 E9/L (ref 1.5–4)
LYMPHOCYTES RELATIVE PERCENT: 30.8 % (ref 20–42)
MCH RBC QN AUTO: 28.8 PG (ref 26–35)
MCHC RBC AUTO-ENTMCNC: 32.9 % (ref 32–34.5)
MCV RBC AUTO: 87.7 FL (ref 80–99.9)
MONOCYTES ABSOLUTE: 0.37 E9/L (ref 0.1–0.95)
MONOCYTES RELATIVE PERCENT: 8.3 % (ref 2–12)
NEUTROPHILS ABSOLUTE: 2.48 E9/L (ref 1.8–7.3)
NEUTROPHILS RELATIVE PERCENT: 55.3 % (ref 43–80)
PDW BLD-RTO: 12.3 FL (ref 11.5–15)
PLATELET # BLD: 189 E9/L (ref 130–450)
PMV BLD AUTO: 10 FL (ref 7–12)
RBC # BLD: 4.96 E12/L (ref 3.5–5.5)
TOTAL IRON BINDING CAPACITY: 299 MCG/DL (ref 250–450)
WBC # BLD: 4.5 E9/L (ref 4.5–11.5)

## 2022-08-17 PROCEDURE — 83550 IRON BINDING TEST: CPT

## 2022-08-17 PROCEDURE — 82728 ASSAY OF FERRITIN: CPT

## 2022-08-17 PROCEDURE — 85025 COMPLETE CBC W/AUTO DIFF WBC: CPT

## 2022-08-17 PROCEDURE — 36415 COLL VENOUS BLD VENIPUNCTURE: CPT

## 2022-08-17 PROCEDURE — 83540 ASSAY OF IRON: CPT

## 2022-08-18 ENCOUNTER — OFFICE VISIT (OUTPATIENT)
Dept: ONCOLOGY | Age: 67
End: 2022-08-18
Payer: COMMERCIAL

## 2022-08-18 VITALS
BODY MASS INDEX: 46.15 KG/M2 | WEIGHT: 277 LBS | HEIGHT: 65 IN | TEMPERATURE: 97.7 F | DIASTOLIC BLOOD PRESSURE: 88 MMHG | SYSTOLIC BLOOD PRESSURE: 148 MMHG | HEART RATE: 71 BPM | OXYGEN SATURATION: 96 %

## 2022-08-18 DIAGNOSIS — D72.819 LEUKOPENIA, UNSPECIFIED TYPE: Primary | ICD-10-CM

## 2022-08-18 DIAGNOSIS — R71.8 MICROCYTOSIS: ICD-10-CM

## 2022-08-18 PROCEDURE — 1123F ACP DISCUSS/DSCN MKR DOCD: CPT | Performed by: INTERNAL MEDICINE

## 2022-08-18 PROCEDURE — 99212 OFFICE O/P EST SF 10 MIN: CPT

## 2022-08-18 PROCEDURE — 99213 OFFICE O/P EST LOW 20 MIN: CPT | Performed by: INTERNAL MEDICINE

## 2022-08-18 NOTE — PROGRESS NOTES
[id number] [facility]  [place] [address]  Dept: [phone]  Loc: [phone]  Attending progress note      Reason for Visit:   Leukopenia, history of PE. Referring Physician: Deedee Suarez CNP    PCP:  Ashley Gonzalez DO    History of Present Illness: The patient is a very pleasant 28-year-old lady,  with a PMH significant for hyperlipedemia, obesity, OA, I met the patient initially in 2016 after diagnosis with PE,she underwent on 1/29/2016 a left total hip arthroplasty, the patient was on Xarelto for about 2 weeks following her surgery. On 3/8/2016 the patient started having chest pain, shortness of breath, palpitations and lightheadedness, she was seen at the ED on 3/9/2916, had a CTA chest done revealing Extensive filling defects involve the distal central right and left pulmonary arteries, in addition to the bilateral upper and lower lobe and right middle lobe pulmonary arteries. Findings are compatible with pulmonary emboli. She had b/l LE venous ultrasounds done, were negative for DVTs. The patient was on Xarelto for 1 year, it was discontinued on 3/22/2017. No recurrent DVTs. She continues to be on baby aspirin. The patient has a history of intermittent leukopenia, her CBCD from 2/4/2021 was remarkable for a white count of 4.3, ANC 2130, hemoglobin 11.6, hematocrit 37.1, MCV 78.1, platelets 349U. The patient was donating blood about once every 8 weeks. The patient did not have any blood donation done since her last visit, she is off the oral iron, she is feeling well, no problems. Review of Systems;  CONSTITUTIONAL: No fever, chills. Good appetite, positive for weight loss. ENMT: Eyes: No diplopia; Nose: No epistaxis. Mouth: No sore throat. RESPIRATORY: No hemoptysis, positive for shortness of breath from asthma. CARDIOVASCULAR: No chest pain, palpitations.   GASTROINTESTINAL: No nausea/vomiting, abdominal pain, diarrhea/constipation. GENITOURINARY: No dysuria, urinary frequency, hematuria. NEURO: No syncope, presyncope, headache.   Remainder:  ROS NEGATIVE    Past Medical History:      Diagnosis Date    Anticoagulant long-term use 03/2016    Xarelto    Asthma     Fatigue     H/O cardiovascular stress test 03/04/2020    Hemorrhoids     Hiatal hernia     Hx of blood clots 03/2016    pulmonary embolism    Hyperlipidemia     Hypertension     Obesity     morbid    Osteoarthritis     Postoperative surgical complication involving digestive system     Wears dentures     permanent upper bridge front right tooth, next 2 on right     Patient Active Problem List   Diagnosis    Primary osteoarthritis of left hip    Acute pulmonary embolism ([address]    Hyperlipidemia    Obesity    Family hx of colon cancer requiring screening colonoscopy    IZQUIERDO (dyspnea on exertion)    Fatigue        Past Surgical History:      Procedure Laterality Date    APPENDECTOMY      CARPAL TUNNEL RELEASE      left    COLONOSCOPY  10/26/2016    Dr. Adriana Blair, Findings: Normal Colon to the cecum - repeat in 10 years    CYST REMOVAL      EYE SURGERY Left 2016    retina tear    GASTRIC BAND  2/27/2007    Lap; 10 cm band; with hiatal hernia repair    JOINT REPLACEMENT Left 1/29/2016    total hip    JOINT REPLACEMENT Bilateral     knees    OTHER SURGICAL HISTORY  12/05/2016    laparscopic gastric band removal and hiatal hernia repair    OVARY REMOVAL Right     right due to cyst    TOTAL KNEE ARTHROPLASTY Bilateral 2012    UPPER GASTROINTESTINAL ENDOSCOPY  2007    UPPER GASTROINTESTINAL ENDOSCOPY  10/26/2016    Dr. Adriana Blair, Findings: GE Reflux, 2.5cm Hiatal Hernia with the slipped stomach up in the chest       Family History:  Family History   Problem Relation Age of Onset    Cancer Father 39        bladder    No Known Problems Mother     No Known Problems Sister     No Known Problems Brother     No Known Problems Maternal Aunt     No Known Problems Maternal Uncle Cancer Paternal Aunt         lung    No Known Problems Paternal Uncle     No Known Problems Maternal Grandmother     No Known Problems Maternal Grandfather     No Known Problems Paternal Grandmother     No Known Problems Paternal Grandfather     Mult Sclerosis Paternal Cousin        Medications:  Reviewed and reconciled. Social History:  Social History     Socioeconomic History    Marital status: Single     Spouse name: Not on file    Number of children: Not on file    Years of education: Not on file    Highest education level: Not on file   Occupational History    Not on file   Tobacco Use    Smoking status: Never    Smokeless tobacco: Never   Vaping Use    Vaping Use: Never used   Substance and Sexual Activity    Alcohol use: No     Comment: 1 to 2 times per year. Hot tea andice tea 1 cup a day. Drug use: No    Sexual activity: Never   Other Topics Concern    Not on file   Social History Narrative    Not on file     Social Determinants of Health     Financial Resource Strain: Not on file   Food Insecurity: Not on file   Transportation Needs: Not on file   Physical Activity: Not on file   Stress: Not on file   Social Connections: Not on file   Intimate Partner Violence: Not on file   Housing Stability: Not on file       Allergies:  No Known Allergies    Physical Exam:  BP (!) 148/88   Pulse 71   Temp 97.7 °F (36.5 °C)   Ht 5' 5\" (1.651 m)   Wt 277 lb (125.6 kg)   SpO2 96%   BMI 46.10 kg/m²   GENERAL: Alert, oriented x 3, not in acute distress. HEENT: PERRLA; EOMI. Oropharynx clear. NECK: Supple. No palpable cervical or supraclavicular lymphadenopathy. LUNGS: Good air entry bilaterally. No wheezing, crackles or rhonchi. CARDIOVASCULAR: Regular rate. No murmurs, rubs or gallops. ABDOMEN: Soft. Non-tender, non-distended. Positive bowel sounds. EXTREMITIES: Without clubbing, cyanosis, or edema. NEUROLOGIC: No focal deficits.    ECOG PS 1    Impression/Plan:     The patient is a very pleasant 77-year-old lady,  with a PMH significant for hyperlipedemia, obesity, OA, I met the patient initially in 2016 after diagnosis with PE,she underwent on 1/29/2016 a left total hip arthroplasty, the patient was on Xarelto for about 2 weeks following her surgery. On 3/8/2016 the patient started having chest pain, shortness of breath, palpitations and lightheadedness, she was seen at the ED on 3/9/2916, had a CTA chest done revealing Extensive filling defects involve the distal central right and left pulmonary arteries, in addition to the bilateral upper and lower lobe and right middle lobe pulmonary arteries. Findings are compatible with pulmonary emboli. She had b/l LE venous ultrasounds done, were negative for DVTs. The patient had a provoked event by a transient surgical risk factor, given the fact that she had extensive bilateral PEs, recommended anticoagulation with Xarelto for a year, repeat chest CT was done revealing complete resolution of the PEs, Xarelto was discontinued on 3/22/2017. She was started on baby aspirin. No recurrent DVTs. She continues to be on baby aspirin. I encouraged her to continue with the weight loss. The patient had mild leukopenia, without lymphocytopenia or neutropenia, most likely was reactive,,ordered a work-up to evaluate for chronic viral infections, autoimmune etiology, and nutritional deficiencies, JOHNNA is negative, no hepatitis or HIV infections, no vitamin B12 or folate deficiency, peripheral blood flow cytometry is unremarkable. Her white count had normalized, ALC had improved. She has minimal lymphocytopenia at this time. Mild microcytic anemia, fit test was negative in March, she is up-to-date with colonoscopies. Most likely this is secondary to blood donation, the patient was started on oral iron, her hemoglobin/hematocrit had normalized, iron panel is normal, she is off the oral iron, we will continue to monitor her CBCD and iron panel.     I encouraged Jose Luis Mcguire to continue with the weight loss. RTC in about 6 months    Thank you for allowing us to participate in the care of Mrs. Wallace.     Leesa Zavala MD   HEMATOLOGY/MEDICAL [address] [address] [address]  [address] [address]  Dept: Malik: [phone]

## 2022-08-26 ENCOUNTER — OFFICE VISIT (OUTPATIENT)
Dept: BARIATRICS/WEIGHT MGMT | Age: 67
End: 2022-08-26
Payer: COMMERCIAL

## 2022-08-26 VITALS
DIASTOLIC BLOOD PRESSURE: 73 MMHG | HEART RATE: 72 BPM | BODY MASS INDEX: 43.23 KG/M2 | SYSTOLIC BLOOD PRESSURE: 117 MMHG | TEMPERATURE: 97.7 F | WEIGHT: 269 LBS | HEIGHT: 66 IN

## 2022-08-26 DIAGNOSIS — E66.01 CLASS 3 SEVERE OBESITY DUE TO EXCESS CALORIES WITH SERIOUS COMORBIDITY AND BODY MASS INDEX (BMI) OF 40.0 TO 44.9 IN ADULT (HCC): ICD-10-CM

## 2022-08-26 DIAGNOSIS — R53.82 CHRONIC FATIGUE: Primary | ICD-10-CM

## 2022-08-26 PROCEDURE — 99211 OFF/OP EST MAY X REQ PHY/QHP: CPT

## 2022-08-26 PROCEDURE — 99214 OFFICE O/P EST MOD 30 MIN: CPT | Performed by: INTERNAL MEDICINE

## 2022-08-26 PROCEDURE — 1123F ACP DISCUSS/DSCN MKR DOCD: CPT | Performed by: INTERNAL MEDICINE

## 2022-08-26 RX ORDER — TOPIRAMATE 25 MG/1
TABLET ORAL
Qty: 60 TABLET | Refills: 1 | Status: SHIPPED | OUTPATIENT
Start: 2022-08-26

## 2022-08-26 NOTE — PROGRESS NOTES
CC -   Follow up of: Fatigue all the time over the last 2 years, Weight gain, exercise intolerance. BACKGROUND -   Last visit: 6/24/22  First visit: 1/12/22  Obesity   Began in childhood  Initial BMI 53.7, Wt 330.2 lbs  HS Grad wt 200 lbs  Lowest   wt 160 lbs (in high school)  Highest  wt 330 lbs  Pattern of wt gain: gradual, but has accelerated since 2020, attributes to decreased activity due to pain  Wt change past yr: +50 lbs  Most wt lost: about 50 lbs (after lap band 2007)  Other diets attempted: Had lap band that was removed after it slipped in 2016, otherwise no specific program, but tried to watch diet    Desire to lose weight: 9/10  Problem posed by appetite: 4/10    Initial Diet:    Number of meals per day - 3    Number of snacks per day - 1-2    Meal volume - 12\" plate, occasionally seconds    Fast food/convenience store - 1x/week    Restaurants (not fast food) - 0-1x/week   Sweets - 1d/week (candy bars or cakes)   Chips - 1d/week   Crackers/pretzels - 0d/week   Nuts - 0d/week   Peanut Butter - 0d/week   Popcorn - 1d/week   Dried fruit - 0d/week   Whole fruit - 3d/week -  - bananas/oranges/grapes   Breakfast cereal - 0d/week   Granola/Protein/Energy bar - 0d/week   Sugar sweetened beverages - tea with 1 teaspoon of sugar daily   Protein - No supplements   Fiber - No supplements     Initial Exercise:    Gym membership - [employer] gym    Walking - no (pain, does some activity with dogs)    Running - no    Resistance - no    Aerobic class - no        Initial Sleep: bedtime: 9-9:30 pm, wakes up at 3 and then falls back to sleep and wakes up at 6 am, wakes up tired.  More awake in the morning, no daytime naps.    ______________________    [facility] -    Past Medical History:   Diagnosis Date    Anticoagulant long-term use 03/2016    Xarelto    Asthma     Fatigue     H/O cardiovascular stress test 03/04/2020    Hemorrhoids     Hiatal hernia     Hx of blood clots 03/2016    pulmonary embolism    Hyperlipidemia Hypertension     Obesity     morbid    Osteoarthritis     Postoperative surgical complication involving digestive system     Wears dentures     permanent upper bridge front right tooth, next 2 on right     Past Surgical History:   Procedure Laterality Date    APPENDECTOMY      CARPAL TUNNEL RELEASE      left    COLONOSCOPY  10/26/2016    Dr. Sallie Pantoja, Findings: Normal Colon to the cecum - repeat in 10 years    CYST REMOVAL      EYE SURGERY Left 2016    retina tear    GASTRIC BAND  2/27/2007    Lap; 10 cm band; with hiatal hernia repair    JOINT REPLACEMENT Left 1/29/2016    total hip    JOINT REPLACEMENT Bilateral     knees    OTHER SURGICAL HISTORY  12/05/2016    laparscopic gastric band removal and hiatal hernia repair    OVARY REMOVAL Right     right due to cyst    TOTAL KNEE ARTHROPLASTY Bilateral 2012    UPPER GASTROINTESTINAL ENDOSCOPY  2007    UPPER GASTROINTESTINAL ENDOSCOPY  10/26/2016    Dr. Sallie Pantoja, Findings: GE Reflux, 2.5cm Hiatal Hernia with the slipped stomach up in the chest       Current Outpatient Medications   Medication Sig Dispense Refill    VALSARTAN-HYDROCHLOROTHIAZIDE PO       famotidine (PEPCID) 20 MG tablet Take 20 mg by mouth daily      ferrous sulfate (IRON 325) 325 (65 Fe) MG tablet Take 1 tablet by mouth daily (with breakfast) 60 tablet 1    metFORMIN (GLUCOPHAGE-XR) 500 MG extended release tablet Take 500 mg by mouth Daily with supper      metoprolol tartrate (LOPRESSOR) 25 MG tablet Take 1 tablet by mouth 2 times daily 60 tablet 9    aspirin 81 MG EC tablet Take 1 tablet by mouth daily      nitroGLYCERIN (NITROSTAT) 0.4 MG SL tablet up to max of 3 total doses.  If no relief after 1 dose, call 911. 25 tablet 1    naproxen sodium (ANAPROX) 220 MG tablet Take 220 mg by mouth 2 times daily as needed for Pain      atorvastatin (LIPITOR) 20 MG tablet Take 20 mg by mouth nightly      acetaminophen (TYLENOL) 500 MG tablet Take 500 mg by mouth every 6 hours as needed for Pain      Multiple Vitamins-Calcium (ONE-A-DAY WOMENS PO) Take 1 tablet by mouth daily      Cholecalciferol 4000 UNITS CAPS Take 1 capsule by mouth daily       No current facility-administered medications for this visit. Metformin 500 bid  Valsartan + Metoprolol for HTN    8/26/22: Atorvastatin has been on hold. Valsartan-hctz has been cut in half, and metoprolol is continued 25 bid. Metformin 500mg xr daily. Family: no family history of DM or heart disease. No known allergies. Never smoked, rare alcohol. 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 Secretarial work. ROS -  Card - no CP  GI - no N/V/D/C  On and off depression    PE -  Gen : /73 (Site: Left Upper Arm, Position: Sitting, Cuff Size: Large Adult)   Pulse 72   Temp 97.7 °F (36.5 °C) (Temporal)   Ht 5' 5.75\" (1.67 m)   Wt 269 lb (122 kg)   BMI 43.75 kg/m²    WN, WD, NAD  Lung: Nml resp effort, CTA b/l. Heart: s1 s2 rrr, no murmur noted, no pedal edema noted  Psych: Normal mood   Full affect  Neuro: Moves all ext well  ______________________    HISTORY & ASSESSMENT/PLAN -     Problem 1  - Fatigue   HPI   - chronic, not as tired as before, still some tiredness in the evening. Assessment  - improving gradually  Plan   - Weight reduction can help. Weight reduction per plan below. Problem 2  - Obesity   HPI   - See above Background for description    Weight  Date    330.2 lbs 01/12/22    310.8 lbs 2/11/22    295.8  4/8/22    278.2  6/24/22    269.0  8/26/22   Topiramate in the evening    Total weight change to date: -61.2 lbs  Average daily energy variance:   01/12/2022 - 2/11/2022: -19.5 lbs (68,250 Jacky)/30 days = -2,275 Jacky/d deficit. 2/11/2022 - 4/8/2022: -15 lbs (87130 Jacky)/55 days = -955 Jacky/day deficit. 4/8/2022 - 6/24/2022: -17.6 lbs (02729 Jacky)/76 days = -811 Jacky/day deficit. 6/24/2022 - 8/26/2022: -9.2 lbs (42212 Jacky)/63 days = -511 Jacky/day deficit.     DEN = 2050 Jacky/d  8/26/22: Patient's estimated daily energy need (DEN) = 1927 Jacky/d = [phone] Jacyk/wk    Update:  Calorie monitoring: has continued VLCD  Sweets: 0d/month (rarely  SSB: none  Restaurant food: 1x/month  Appetite suppressant: none. But feels in the evenings she feels she needs a medication to help with appetite. Protein: protein shakes  Fiber: fiber one cereal  Water: >96 oz/day. Activities: taking dogs for walk, household chores, hip pain limiting    Assessment  - improving  Plan   - She has been doing very well with her plan. But in the recent weeks she has felt difficulty with appetite, feels like she needs something for appetite suppression gregorio in the evening after work. After going over options and adverse effects, we planned on starting Topiramate nightly. Planned as follows:  Patient Instructions   Rules:  2 protein shakes (see below), and a frozen meal in a day as discussed (lean cuisine, healthy choice etc). Drink at least 2 liters of water in a day. Requirements:  Make sure protein intake is at least 60 grams per day  Make sure that fiber intake is at least 22 grams per day. Take about 22 almonds a day. Take one multivitamin every day     Targets:  Limit calorie intake to 1000 calories/day. Walk 30 minutes daily  Avoid eating 2 hours within bedtime. Tips:  Do not eat outside of the dining room or the kitchen  Do not eat while watching TV, videos, working on the computer or using a smart phone  Do not eat food out of a multi-serving bag or container. Topiramate:  Start Topiramate 25 mg once every evening. Do this at least for 1 week. If the appetite suppression is insufficient, then increase to two tablets every evening. Follow up in 2 months. Medication management: Topiramate. Jaylene Fernandez MD  Obesity Medicine  8/26/2022.

## 2022-08-26 NOTE — PATIENT INSTRUCTIONS
Rules:  2 protein shakes (see below), and a frozen meal in a day as discussed (lean cuisine, healthy choice etc). Drink at least 2 liters of water in a day. Requirements:  Make sure protein intake is at least 60 grams per day  Make sure that fiber intake is at least 22 grams per day. Take about 22 almonds a day. Take one multivitamin every day     Targets:  Limit calorie intake to 1000 calories/day. Walk 30 minutes daily  Avoid eating 2 hours within bedtime. Tips:  Do not eat outside of the dining room or the kitchen  Do not eat while watching TV, videos, working on the computer or using a smart phone  Do not eat food out of a multi-serving bag or container. Topiramate:  Start Topiramate 25 mg once every evening. Do this at least for 1 week. If the appetite suppression is insufficient, then increase to two tablets every evening. Follow up in 2 months.

## 2022-09-23 ENCOUNTER — HOSPITAL ENCOUNTER (OUTPATIENT)
Age: 67
Discharge: HOME | End: 2022-09-23
Payer: COMMERCIAL

## 2022-09-23 LAB
ALBUMIN SERPL-MCNC: 4.3 G/DL (ref 3.5–5.2)
ALP BLD-CCNC: 54 U/L (ref 35–104)
ALT SERPL-CCNC: 14 U/L (ref 0–32)
ANION GAP SERPL CALCULATED.3IONS-SCNC: 11 MMOL/L (ref 7–16)
AST SERPL-CCNC: 16 U/L (ref 0–31)
BASOPHILS ABSOLUTE: 0.04 E9/L (ref 0–0.2)
BASOPHILS RELATIVE PERCENT: 0.9 % (ref 0–2)
BILIRUB SERPL-MCNC: 0.6 MG/DL (ref 0–1.2)
BUN BLDV-MCNC: 21 MG/DL (ref 6–23)
CALCIUM SERPL-MCNC: 10.5 MG/DL (ref 8.6–10.2)
CHLORIDE BLD-SCNC: 101 MMOL/L (ref 98–107)
CHOLESTEROL, TOTAL: 217 MG/DL (ref 0–199)
CO2: 26 MMOL/L (ref 22–29)
CREAT SERPL-MCNC: 0.8 MG/DL (ref 0.5–1)
EOSINOPHILS ABSOLUTE: 0.24 E9/L (ref 0.05–0.5)
EOSINOPHILS RELATIVE PERCENT: 5.2 % (ref 0–6)
GFR AFRICAN AMERICAN: >60
GFR NON-AFRICAN AMERICAN: >60 ML/MIN/1.73
GLUCOSE BLD-MCNC: 119 MG/DL (ref 74–99)
HBA1C MFR BLD: 5.7 % (ref 4–5.6)
HCT VFR BLD CALC: 43.4 % (ref 34–48)
HDLC SERPL-MCNC: 51 MG/DL
HEMOGLOBIN: 14.6 G/DL (ref 11.5–15.5)
IMMATURE GRANULOCYTES #: 0 E9/L
IMMATURE GRANULOCYTES %: 0 % (ref 0–5)
LDL CHOLESTEROL CALCULATED: 143 MG/DL (ref 0–99)
LYMPHOCYTES ABSOLUTE: 1.05 E9/L (ref 1.5–4)
LYMPHOCYTES RELATIVE PERCENT: 22.7 % (ref 20–42)
MCH RBC QN AUTO: 28.6 PG (ref 26–35)
MCHC RBC AUTO-ENTMCNC: 33.6 % (ref 32–34.5)
MCV RBC AUTO: 85.1 FL (ref 80–99.9)
MONOCYTES ABSOLUTE: 0.49 E9/L (ref 0.1–0.95)
MONOCYTES RELATIVE PERCENT: 10.6 % (ref 2–12)
NEUTROPHILS ABSOLUTE: 2.81 E9/L (ref 1.8–7.3)
NEUTROPHILS RELATIVE PERCENT: 60.6 % (ref 43–80)
PDW BLD-RTO: 12 FL (ref 11.5–15)
PLATELET # BLD: 189 E9/L (ref 130–450)
PMV BLD AUTO: 9.6 FL (ref 7–12)
POTASSIUM SERPL-SCNC: 4.2 MMOL/L (ref 3.5–5)
RBC # BLD: 5.1 E12/L (ref 3.5–5.5)
SODIUM BLD-SCNC: 138 MMOL/L (ref 132–146)
T4 TOTAL: 6.7 MCG/DL (ref 4.5–11.7)
TOTAL PROTEIN: 6.9 G/DL (ref 6.4–8.3)
TRIGL SERPL-MCNC: 117 MG/DL (ref 0–149)
TSH SERPL DL<=0.05 MIU/L-ACNC: 1.51 UIU/ML (ref 0.27–4.2)
VLDLC SERPL CALC-MCNC: 23 MG/DL
WBC # BLD: 4.6 E9/L (ref 4.5–11.5)

## 2022-09-23 PROCEDURE — 85025 COMPLETE CBC W/AUTO DIFF WBC: CPT

## 2022-09-23 PROCEDURE — 80053 COMPREHEN METABOLIC PANEL: CPT

## 2022-09-23 PROCEDURE — 36415 COLL VENOUS BLD VENIPUNCTURE: CPT

## 2022-09-23 PROCEDURE — 83036 HEMOGLOBIN GLYCOSYLATED A1C: CPT

## 2022-09-23 PROCEDURE — 84436 ASSAY OF TOTAL THYROXINE: CPT

## 2022-09-23 PROCEDURE — 80061 LIPID PANEL: CPT

## 2022-09-23 PROCEDURE — 84443 ASSAY THYROID STIM HORMONE: CPT

## 2022-10-28 ENCOUNTER — OFFICE VISIT (OUTPATIENT)
Dept: BARIATRICS/WEIGHT MGMT | Age: 67
End: 2022-10-28
Payer: COMMERCIAL

## 2022-10-28 VITALS
WEIGHT: 267 LBS | BODY MASS INDEX: 42.91 KG/M2 | HEART RATE: 71 BPM | TEMPERATURE: 97.5 F | DIASTOLIC BLOOD PRESSURE: 69 MMHG | HEIGHT: 66 IN | SYSTOLIC BLOOD PRESSURE: 124 MMHG

## 2022-10-28 DIAGNOSIS — E66.01 CLASS 3 SEVERE OBESITY DUE TO EXCESS CALORIES WITH SERIOUS COMORBIDITY AND BODY MASS INDEX (BMI) OF 40.0 TO 44.9 IN ADULT (HCC): ICD-10-CM

## 2022-10-28 DIAGNOSIS — R53.82 CHRONIC FATIGUE: Primary | ICD-10-CM

## 2022-10-28 PROCEDURE — 99214 OFFICE O/P EST MOD 30 MIN: CPT | Performed by: INTERNAL MEDICINE

## 2022-10-28 PROCEDURE — 1123F ACP DISCUSS/DSCN MKR DOCD: CPT | Performed by: INTERNAL MEDICINE

## 2022-10-28 NOTE — PATIENT INSTRUCTIONS
Rules:  2 protein shakes (see below), and a frozen meal in a day as discussed (lean cuisine, healthy choice etc). Drink at least 2 liters of water in a day. Requirements:  Make sure protein intake is at least 60 grams per day  Make sure that fiber intake is at least 22 grams per day. Take about 22 almonds a day. Take one multivitamin every day     Targets:  Limit calorie intake to 1000 calories/day. Walk 30 minutes daily  Avoid eating 2 hours within bedtime. Tips:  Do not eat outside of the dining room or the kitchen  Do not eat while watching TV, videos, working on the computer or using a smart phone  Do not eat food out of a multi-serving bag or container. Topiramate:  Increase to 50 mg daily in the evening. Follow up in 2 months.

## 2022-10-28 NOTE — PROGRESS NOTES
CC -   Follow up of: Fatigue all the time over the last 2 years, Weight gain, exercise intolerance. BACKGROUND -   Last visit: 8/26/22  First visit: 1/12/22    Obesity   Began in childhood  Initial BMI 53.7, Wt 330.2 lbs  HS Grad wt 200 lbs  Lowest   wt 160 lbs (in high school)  Highest  wt 330 lbs  Pattern of wt gain: gradual, but has accelerated since 2020, attributes to decreased activity due to pain  Wt change past yr: +50 lbs  Most wt lost: about 50 lbs (after lap band 2007)  Other diets attempted: Had lap band that was removed after it slipped in 2016, otherwise no specific program, but tried to watch diet    Desire to lose weight: 9/10  Problem posed by appetite: 4/10    Initial Diet:    Number of meals per day - 3    Number of snacks per day - 1-2    Meal volume - 12\" plate, occasionally seconds    Fast food/convenience store - 1x/week    Restaurants (not fast food) - 0-1x/week   Sweets - 1d/week (candy bars or cakes)   Chips - 1d/week   Crackers/pretzels - 0d/week   Nuts - 0d/week   Peanut Butter - 0d/week   Popcorn - 1d/week   Dried fruit - 0d/week   Whole fruit - 3d/week -  - bananas/oranges/grapes   Breakfast cereal - 0d/week   Granola/Protein/Energy bar - 0d/week   Sugar sweetened beverages - tea with 1 teaspoon of sugar daily   Protein - No supplements   Fiber - No supplements     Initial Exercise:    Gym membership - [employer]    Walking - no (pain, does some activity with dogs)    Running - no    Resistance - no    Aerobic class - no        Initial Sleep: bedtime: 9-9:30 pm, wakes up at 3 and then falls back to sleep and wakes up at 6 am, wakes up tired.  More awake in the morning, no daytime naps.    ______________________    [facility] -    Past Medical History:   Diagnosis Date    Anticoagulant long-term use 03/2016    Xarelto    Asthma     Fatigue     H/O cardiovascular stress test 03/04/2020    Hemorrhoids     Hiatal hernia     Hx of blood clots 03/2016    pulmonary embolism Hyperlipidemia     Hypertension     Obesity     morbid    Osteoarthritis     Postoperative surgical complication involving digestive system     Wears dentures     permanent upper bridge front right tooth, next 2 on right     Past Surgical History:   Procedure Laterality Date    APPENDECTOMY      CARPAL TUNNEL RELEASE      left    COLONOSCOPY  10/26/2016    Dr. Omar Garcia, Findings: Normal Colon to the cecum - repeat in 10 years    CYST REMOVAL      EYE SURGERY Left 2016    retina tear    GASTRIC BAND  2/27/2007    Lap; 10 cm band; with hiatal hernia repair    JOINT REPLACEMENT Left 1/29/2016    total hip    JOINT REPLACEMENT Bilateral     knees    OTHER SURGICAL HISTORY  12/05/2016    laparscopic gastric band removal and hiatal hernia repair    OVARY REMOVAL Right     right due to cyst    TOTAL KNEE ARTHROPLASTY Bilateral 2012    UPPER GASTROINTESTINAL ENDOSCOPY  2007    UPPER GASTROINTESTINAL ENDOSCOPY  10/26/2016    Dr. Omar Garcia, Findings: GE Reflux, 2.5cm Hiatal Hernia with the slipped stomach up in the chest       Current Outpatient Medications   Medication Sig Dispense Refill    topiramate (TOPAMAX) 25 MG tablet Start Topiramate 25 mg once every evening. Do this at least for 1 week. If the appetite suppression is insufficient, then increase to two tablets every evening. 60 tablet 1    VALSARTAN-HYDROCHLOROTHIAZIDE PO       famotidine (PEPCID) 20 MG tablet Take 20 mg by mouth daily      ferrous sulfate (IRON 325) 325 (65 Fe) MG tablet Take 1 tablet by mouth daily (with breakfast) 60 tablet 1    metFORMIN (GLUCOPHAGE-XR) 500 MG extended release tablet Take 500 mg by mouth Daily with supper      metoprolol tartrate (LOPRESSOR) 25 MG tablet Take 1 tablet by mouth 2 times daily 60 tablet 9    aspirin 81 MG EC tablet Take 1 tablet by mouth daily      nitroGLYCERIN (NITROSTAT) 0.4 MG SL tablet up to max of 3 total doses.  If no relief after 1 dose, call 911. 25 tablet 1    naproxen sodium (ANAPROX) 220 MG tablet Take 220 mg by mouth 2 times daily as needed for Pain      atorvastatin (LIPITOR) 20 MG tablet Take 20 mg by mouth nightly      acetaminophen (TYLENOL) 500 MG tablet Take 500 mg by mouth every 6 hours as needed for Pain      Multiple Vitamins-Calcium (ONE-A-DAY WOMENS PO) Take 1 tablet by mouth daily      Cholecalciferol 4000 UNITS CAPS Take 1 capsule by mouth daily       No current facility-administered medications for this visit. Metformin 500 bid  Valsartan + Metoprolol for HTN    8/26/22: Atorvastatin has been on hold. Valsartan-hctz has been cut in half, and metoprolol is continued 25 daily. Metformin 500mg xr daily. 10/28/22: Valsartan now 80 mg (was cutting in half prior). Family: no family history of DM or heart disease. No known allergies. Never smoked, rare alcohol. 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 Secretarial work. ROS -  Card - no CP  GI - no N/V/D/C  On and off depression    PE -  Gen : /69 (Site: Left Upper Arm, Position: Sitting, Cuff Size: Large Adult)   Pulse 71   Temp 97.5 °F (36.4 °C) (Temporal)   Ht 5' 5.75\" (1.67 m)   Wt 267 lb (121.1 kg)   BMI 43.42 kg/m²    WN, WD, NAD  Lung: Nml resp effort, CTA b/l. Heart: s1 s2 rrr, no murmur noted, trace pedal edema noted  Psych: Normal mood   Full affect  Neuro: Moves all ext well  ______________________    HISTORY & ASSESSMENT/PLAN -     Problem 1  - Fatigue   HPI   - chronic, not as tired as before, still some tiredness in the evening. Assessment  - improving gradually  Plan   - Weight reduction can help. Weight reduction per plan below.      Problem 2  - Obesity   HPI   - See above Background for description    Weight  Date    330.2 lbs 01/12/22    310.8 lbs 2/11/22    295.8  4/8/22    278.2  6/24/22    269.0  8/26/22   Topiramate in the evening    267.0  10/28/22 Topiramate to 50 mg qhs    Total weight change to date: -63.2 lbs  Average daily energy variance:   01/12/2022 - 2/11/2022: -19.5 lbs (68,250 Jacky)/30 d = -2,275 Jacky/d deficit. 2/11/2022 - 4/8/2022: -15 lbs (73026 Jacky)/55 d = -955 Jacky/d deficit. 4/8/2022 - 6/24/2022: -17.6 lbs (85216 Jacky)/76 d = -811 Jacky/d deficit. 6/24/2022 - 8/26/2022: -9.2 lbs (95250 Jacky)/63 d = -511 Jacky/d deficit. 8/26/2022 - 10/28/2022: -2.0 lbs (7000 Jacky)/63 d = -111 Jacky/d deficit. DEN = 2050 Jacky/d    8/26/22: Patient's estimated daily energy need (DEN) = 1927 Jacky/d = 94898 Jacky/wk    Update:  Calorie monitoring: has continued VLCD almost every day - had refrigerator problem for 3 weeks. Sweets: 0d/month  SSB: none (once in a great while sugar in tea)  Restaurant food: 1x/wk in the last 1 month  Appetite suppressant: Topiramate 25 mg in the evening. Appetite suppression about 1-2/10, side effect - denies  Protein: protein shakes  Fiber: fiber one cereal  Water: >96 oz/day. Activities: taking dogs for walk, household chores, hip pain limiting    Assessment  - improving  Plan   - She has been well with her plan. She has only been taking 25 mg of Topiramate at night, and would like to increase to 50 qhs. She will message in [employer] when she needs refill. BMP reviewed. Planned as follows:  Patient Instructions   Rules:  2 protein shakes (see below), and a frozen meal in a day as discussed (lean cuisine, healthy choice etc). Drink at least 2 liters of water in a day. Requirements:  Make sure protein intake is at least 60 grams per day  Make sure that fiber intake is at least 22 grams per day. Take about 22 almonds a day. Take one multivitamin every day     Targets:  Limit calorie intake to 1000 calories/day. Walk 30 minutes daily  Avoid eating 2 hours within bedtime. Tips:  Do not eat outside of the dining room or the kitchen  Do not eat while watching TV, videos, working on the computer or using a smart phone  Do not eat food out of a multi-serving bag or container. Topiramate:  Increase to 50 mg daily in the evening. Follow up in 2 months.     Medication management: Topiramate. Vivi Gray MD  Obesity Medicine  10/28/2022.

## 2022-12-31 ENCOUNTER — HOSPITAL ENCOUNTER (OUTPATIENT)
Age: 67
Discharge: HOME | End: 2022-12-31
Payer: COMMERCIAL

## 2022-12-31 LAB
ALBUMIN SERPL-MCNC: 4 G/DL (ref 3.5–5.2)
ALP BLD-CCNC: 60 U/L (ref 35–104)
ALT SERPL-CCNC: 14 U/L (ref 0–32)
ANION GAP SERPL CALCULATED.3IONS-SCNC: 9 MMOL/L (ref 7–16)
AST SERPL-CCNC: 15 U/L (ref 0–31)
BASOPHILS ABSOLUTE: 0.02 E9/L (ref 0–0.2)
BASOPHILS RELATIVE PERCENT: 0.4 % (ref 0–2)
BILIRUB SERPL-MCNC: 0.5 MG/DL (ref 0–1.2)
BUN BLDV-MCNC: 23 MG/DL (ref 6–23)
CALCIUM SERPL-MCNC: 9.8 MG/DL (ref 8.6–10.2)
CHLORIDE BLD-SCNC: 100 MMOL/L (ref 98–107)
CHOLESTEROL, FASTING: 151 MG/DL (ref 0–199)
CO2: 29 MMOL/L (ref 22–29)
CREAT SERPL-MCNC: 0.8 MG/DL (ref 0.5–1)
EOSINOPHILS ABSOLUTE: 0.16 E9/L (ref 0.05–0.5)
EOSINOPHILS RELATIVE PERCENT: 3.6 % (ref 0–6)
GFR SERPL CREATININE-BSD FRML MDRD: >60 ML/MIN/1.73
GLUCOSE BLD-MCNC: 113 MG/DL (ref 74–99)
HBA1C MFR BLD: 5.9 % (ref 4–5.6)
HCT VFR BLD CALC: 42 % (ref 34–48)
HDLC SERPL-MCNC: 56 MG/DL
HEMOGLOBIN: 14 G/DL (ref 11.5–15.5)
IMMATURE GRANULOCYTES #: 0.01 E9/L
IMMATURE GRANULOCYTES %: 0.2 % (ref 0–5)
LDL CHOLESTEROL CALCULATED: 70 MG/DL (ref 0–99)
LYMPHOCYTES ABSOLUTE: 1.17 E9/L (ref 1.5–4)
LYMPHOCYTES RELATIVE PERCENT: 26 % (ref 20–42)
MCH RBC QN AUTO: 28.9 PG (ref 26–35)
MCHC RBC AUTO-ENTMCNC: 33.3 % (ref 32–34.5)
MCV RBC AUTO: 86.6 FL (ref 80–99.9)
MONOCYTES ABSOLUTE: 0.38 E9/L (ref 0.1–0.95)
MONOCYTES RELATIVE PERCENT: 8.4 % (ref 2–12)
NEUTROPHILS ABSOLUTE: 2.76 E9/L (ref 1.8–7.3)
NEUTROPHILS RELATIVE PERCENT: 61.4 % (ref 43–80)
PARATHYROID HORMONE INTACT: 20 PG/ML (ref 15–65)
PDW BLD-RTO: 12 FL (ref 11.5–15)
PLATELET # BLD: 198 E9/L (ref 130–450)
PMV BLD AUTO: 9.2 FL (ref 7–12)
POTASSIUM SERPL-SCNC: 4 MMOL/L (ref 3.5–5)
RBC # BLD: 4.85 E12/L (ref 3.5–5.5)
SODIUM BLD-SCNC: 138 MMOL/L (ref 132–146)
T4 TOTAL: 6.9 MCG/DL (ref 4.5–11.7)
TOTAL PROTEIN: 6.3 G/DL (ref 6.4–8.3)
TRIGLYCERIDE, FASTING: 126 MG/DL (ref 0–149)
TSH SERPL DL<=0.05 MIU/L-ACNC: 1.33 UIU/ML (ref 0.27–4.2)
VLDLC SERPL CALC-MCNC: 25 MG/DL
WBC # BLD: 4.5 E9/L (ref 4.5–11.5)

## 2022-12-31 PROCEDURE — 85025 COMPLETE CBC W/AUTO DIFF WBC: CPT

## 2022-12-31 PROCEDURE — 36415 COLL VENOUS BLD VENIPUNCTURE: CPT

## 2022-12-31 PROCEDURE — 84443 ASSAY THYROID STIM HORMONE: CPT

## 2022-12-31 PROCEDURE — 80053 COMPREHEN METABOLIC PANEL: CPT

## 2022-12-31 PROCEDURE — 84436 ASSAY OF TOTAL THYROXINE: CPT

## 2022-12-31 PROCEDURE — 83970 ASSAY OF PARATHORMONE: CPT

## 2022-12-31 PROCEDURE — 83036 HEMOGLOBIN GLYCOSYLATED A1C: CPT

## 2022-12-31 PROCEDURE — 80061 LIPID PANEL: CPT

## 2023-01-05 ENCOUNTER — OFFICE VISIT (OUTPATIENT)
Dept: BARIATRICS/WEIGHT MGMT | Age: 68
End: 2023-01-05
Payer: COMMERCIAL

## 2023-01-05 VITALS
WEIGHT: 279.8 LBS | DIASTOLIC BLOOD PRESSURE: 67 MMHG | TEMPERATURE: 97.4 F | HEIGHT: 66 IN | BODY MASS INDEX: 44.97 KG/M2 | SYSTOLIC BLOOD PRESSURE: 120 MMHG | HEART RATE: 78 BPM

## 2023-01-05 DIAGNOSIS — E66.01 CLASS 3 SEVERE OBESITY DUE TO EXCESS CALORIES WITH SERIOUS COMORBIDITY AND BODY MASS INDEX (BMI) OF 45.0 TO 49.9 IN ADULT (HCC): ICD-10-CM

## 2023-01-05 DIAGNOSIS — E66.9 DIABETES MELLITUS TYPE 2 IN OBESE (HCC): Primary | ICD-10-CM

## 2023-01-05 DIAGNOSIS — E11.69 DIABETES MELLITUS TYPE 2 IN OBESE (HCC): Primary | ICD-10-CM

## 2023-01-05 DIAGNOSIS — R53.82 CHRONIC FATIGUE: ICD-10-CM

## 2023-01-05 PROCEDURE — 1123F ACP DISCUSS/DSCN MKR DOCD: CPT | Performed by: INTERNAL MEDICINE

## 2023-01-05 PROCEDURE — 99214 OFFICE O/P EST MOD 30 MIN: CPT | Performed by: INTERNAL MEDICINE

## 2023-01-05 PROCEDURE — 99211 OFF/OP EST MAY X REQ PHY/QHP: CPT

## 2023-01-05 RX ORDER — SEMAGLUTIDE 1.34 MG/ML
INJECTION, SOLUTION SUBCUTANEOUS
Qty: 3 ML | Refills: 0 | Status: SHIPPED | OUTPATIENT
Start: 2023-01-05

## 2023-01-05 NOTE — PATIENT INSTRUCTIONS
Rules:  2 protein shakes (see below), and a frozen meal in a day as discussed (lean cuisine, healthy choice etc). Drink at least 2 liters of water in a day. Requirements:  Make sure protein intake is at least 60 grams per day  Make sure that fiber intake is at least 22 grams per day. Take about 22 almonds a day. Take one multivitamin every day     Targets:  Limit calorie intake to 1000 calories/day. Walk 30 minutes daily  Avoid eating 2 hours within bedtime. Tips:  Do not eat outside of the dining room or the kitchen  Do not eat while watching TV, videos, working on the computer or using a smart phone  Do not eat food out of a multi-serving bag or container. Ozempic:  Start Ozempic by taking 0.25 mg once each week for four weeks, then increase to 0.5 mg once each weeks; at the end of four weeks contact me for the next prescription. Stop Topiramate. Follow up in 4-6 weeks.

## 2023-01-05 NOTE — PROGRESS NOTES
CC -   Follow up of: Fatigue all the time over the last 2 years, Weight gain, exercise intolerance. BACKGROUND -   Last visit: 10/28/22  First visit: 1/12/22    Obesity   Began in childhood  Initial BMI 53.7, Wt 330.2 lbs  HS Grad wt 200 lbs  Lowest   wt 160 lbs (in high school)  Highest  wt 330 lbs  Pattern of wt gain: gradual, but has accelerated since 2020, attributes to decreased activity due to pain  Wt change past yr: +50 lbs  Most wt lost: about 50 lbs (after lap band 2007)  Other diets attempted: Had lap band that was removed after it slipped in 2016, otherwise no specific program, but tried to watch diet    Desire to lose weight: 9/10  Problem posed by appetite: 4/10    Initial Diet:    Number of meals per day - 3    Number of snacks per day - 1-2    Meal volume - 12\" plate, occasionally seconds    Fast food/convenience store - 1x/week    Restaurants (not fast food) - 0-1x/week   Sweets - 1d/week (candy bars or cakes)   Chips - 1d/week   Crackers/pretzels - 0d/week   Nuts - 0d/week   Peanut Butter - 0d/week   Popcorn - 1d/week   Dried fruit - 0d/week   Whole fruit - 3d/week -  - bananas/oranges/grapes   Breakfast cereal - 0d/week   Granola/Protein/Energy bar - 0d/week   Sugar sweetened beverages - tea with 1 teaspoon of sugar daily   Protein - No supplements   Fiber - No supplements     Initial Exercise:    Gym membership - [employer]    Walking - no (pain, does some activity with dogs)    Running - no    Resistance - no    Aerobic class - no        Initial Sleep: bedtime: 9-9:30 pm, wakes up at 3 and then falls back to sleep and wakes up at 6 am, wakes up tired.  More awake in the morning, no daytime naps.    ______________________    [facility] [place] [facility] -    Past Medical History:   Diagnosis Date    Anticoagulant long-term use 03/2016    Xarelto    Asthma     Fatigue     H/O cardiovascular stress test 03/04/2020    Hemorrhoids     Hiatal hernia     Hx of blood clots 03/2016    pulmonary embolism Hyperlipidemia     Hypertension     Obesity     morbid    Osteoarthritis     Postoperative surgical complication involving digestive system     Wears dentures     permanent upper bridge front right tooth, next 2 on right     Past Surgical History:   Procedure Laterality Date    APPENDECTOMY      CARPAL TUNNEL RELEASE      left    COLONOSCOPY  10/26/2016    Dr. Woodfin Claude, Findings: Normal Colon to the cecum - repeat in 10 years    CYST REMOVAL      EYE SURGERY Left 2016    retina tear    GASTRIC BAND  2/27/2007    Lap; 10 cm band; with hiatal hernia repair    JOINT REPLACEMENT Left 1/29/2016    total hip    JOINT REPLACEMENT Bilateral     knees    OTHER SURGICAL HISTORY  12/05/2016    laparscopic gastric band removal and hiatal hernia repair    OVARY REMOVAL Right     right due to cyst    TOTAL KNEE ARTHROPLASTY Bilateral 2012    UPPER GASTROINTESTINAL ENDOSCOPY  2007    UPPER GASTROINTESTINAL ENDOSCOPY  10/26/2016    Dr. Woodfin Claude, Findings: GE Reflux, 2.5cm Hiatal Hernia with the slipped stomach up in the chest       Current Outpatient Medications   Medication Sig Dispense Refill    topiramate (TOPAMAX) 25 MG tablet Start Topiramate 25 mg once every evening. Do this at least for 1 week. If the appetite suppression is insufficient, then increase to two tablets every evening. 60 tablet 1    VALSARTAN-HYDROCHLOROTHIAZIDE PO       famotidine (PEPCID) 20 MG tablet Take 20 mg by mouth daily      ferrous sulfate (IRON 325) 325 (65 Fe) MG tablet Take 1 tablet by mouth daily (with breakfast) 60 tablet 1    metFORMIN (GLUCOPHAGE-XR) 500 MG extended release tablet Take 500 mg by mouth Daily with supper      metoprolol tartrate (LOPRESSOR) 25 MG tablet Take 1 tablet by mouth 2 times daily 60 tablet 9    aspirin 81 MG EC tablet Take 1 tablet by mouth daily      nitroGLYCERIN (NITROSTAT) 0.4 MG SL tablet up to max of 3 total doses.  If no relief after 1 dose, call 911. 25 tablet 1    naproxen sodium (ANAPROX) 220 MG tablet Take 220 mg by mouth 2 times daily as needed for Pain      atorvastatin (LIPITOR) 20 MG tablet Take 20 mg by mouth nightly      acetaminophen (TYLENOL) 500 MG tablet Take 500 mg by mouth every 6 hours as needed for Pain      Multiple Vitamins-Calcium (ONE-A-DAY WOMENS PO) Take 1 tablet by mouth daily      Cholecalciferol 4000 UNITS CAPS Take 1 capsule by mouth daily       No current facility-administered medications for this visit. Metformin 500 bid  Valsartan + Metoprolol for HTN    8/26/22: Atorvastatin has been on hold. Valsartan-hctz has been cut in half, and metoprolol is continued 25 daily. Metformin 500mg xr daily. 10/28/22: Valsartan now 80 mg (was cutting in half prior). Family: no family history of DM or heart disease. No known allergies. Never smoked, rare alcohol. 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 Heart: s1 s2 rrr, no murmur noted, trace pedal edema noted  Psych: Normal mood   Full affect  Neuro: Moves all ext well  ______________________    HISTORY & ASSESSMENT/PLAN -     Problem 1  - Fatigue   HPI   - chronic, not as tired as before, still some tiredness in the evening. Assessment  - improving gradually  Plan   - Weight reduction can help. Weight reduction per plan below.      Problem 2  - Obesity   HPI   - See above Background for description    Weight  Date    330.2 lbs 01/12/22    310.8 lbs 2/11/22    295.8  4/8/22    278.2  6/24/22    269.0  8/26/22   Topiramate in the evening    267.0  10/28/22 Topiramate to 50 mg qhs    279.8  1/5/23    Total weight change to date: -50.4 lbs  Average daily energy variance:   01/12/2022 - 2/11/2022: -19.5 lbs (68,250 Jacky)/30 d = -2,275 Jacky/d deficit. 2/11/2022 - 4/8/2022: -15 lbs (24421 Jacky)/55 d = -955 Jacky/d deficit. 4/8/2022 - 6/24/2022: -17.6 lbs (05195 Jacky)/76 d = -811 Jacky/d deficit. 6/24/2022 - 8/26/2022: -9.2 lbs (11348 Jacky)/63 d = -511 Jacky/d deficit. 8/26/2022 - 10/28/2022: -2.0 lbs (7000 Jacky)/63 d = -111 Jacky/d deficit. 10/28/2022 - 1/5/2023: +12.8 lbs (08960 Jacky)/69 d = +649 Jacky/d excess. DEN = 2050 Jacky/d    8/26/22: Patient's estimated daily energy need (DEN) = 1927 Jacky/d = 89450 Jacky/wk    Update:  VLCD + comfort foods this time  Sweets: 2-3d/month  SSB: none except sugar in tea  Restaurant food: 0x/wk  Appetite suppressant: Topiramate 25 mg - not taking because she does not think it helps. Protein: protein shakes  Fiber: fiber one cereal  Water: >96 oz/day. Activities: taking dogs for walk, household chores, hip pain limiting    Assessment  - improving  Plan   - She feels she fell off track this time, and was taking more of comfort foods. Topiramate was not helping either. We planned on getting back on the diet plan, and start Ozempic as it may help with DM2 as well as weight loss. Planned as follows  Patient Instructions   Rules:  2 protein shakes (see below), and a frozen meal in a day as discussed (lean cuisine, healthy choice etc). Drink at least 2 liters of water in a day. Requirements:  Make sure protein intake is at least 60 grams per day  Make sure that fiber intake is at least 22 grams per day. Take about 22 almonds a day. Take one multivitamin every day     Targets:  Limit calorie intake to 1000 calories/day. Walk 30 minutes daily  Avoid eating 2 hours within bedtime. Tips:  Do not eat outside of the dining room or the kitchen  Do not eat while watching TV, videos, working on the computer or using a smart phone  Do not eat food out of a multi-serving bag or container.     Ozempic:  Start Ozempic by taking 0.25 mg once each week for four weeks, then increase to 0.5 mg once each weeks; at the end of four weeks contact me for the next prescription. Stop Topiramate. Follow up in 4-6 weeks. Medication management: Thea Murphy MD  Obesity Medicine  1/5/2023.

## 2023-01-06 ENCOUNTER — TELEPHONE (OUTPATIENT)
Dept: BARIATRICS/WEIGHT MGMT | Age: 68
End: 2023-01-06

## 2023-01-09 DIAGNOSIS — E66.01 CLASS 3 SEVERE OBESITY DUE TO EXCESS CALORIES WITH SERIOUS COMORBIDITY AND BODY MASS INDEX (BMI) OF 45.0 TO 49.9 IN ADULT (HCC): Primary | ICD-10-CM

## 2023-01-09 NOTE — PROGRESS NOTES
Ozempic was not covered (no PA availble). Sent wegovy for obesity. Roland Winkler MD  Internal Medicine/Obesity Medicine  1/9/2023.

## 2023-01-11 ENCOUNTER — TELEPHONE (OUTPATIENT)
Dept: BARIATRICS/WEIGHT MGMT | Age: 68
End: 2023-01-11

## 2023-02-08 ENCOUNTER — HOSPITAL ENCOUNTER (OUTPATIENT)
Dept: INFUSION THERAPY | Age: 68
Discharge: HOME | End: 2023-02-08
Payer: COMMERCIAL

## 2023-02-08 DIAGNOSIS — D72.819 LEUKOPENIA, UNSPECIFIED TYPE: ICD-10-CM

## 2023-02-08 DIAGNOSIS — R71.8 MICROCYTOSIS: ICD-10-CM

## 2023-02-08 LAB
BASOPHILS ABSOLUTE: 0.02 E9/L (ref 0–0.2)
BASOPHILS RELATIVE PERCENT: 0.4 % (ref 0–2)
EOSINOPHILS ABSOLUTE: 0.14 E9/L (ref 0.05–0.5)
EOSINOPHILS RELATIVE PERCENT: 3 % (ref 0–6)
FERRITIN: 116 NG/ML
HCT VFR BLD CALC: 47.2 % (ref 34–48)
HEMOGLOBIN: 15.3 G/DL (ref 11.5–15.5)
IMMATURE GRANULOCYTES #: 0.01 E9/L
IMMATURE GRANULOCYTES %: 0.2 % (ref 0–5)
IRON SATURATION: 23 % (ref 15–50)
IRON: 78 MCG/DL (ref 37–145)
LYMPHOCYTES ABSOLUTE: 0.94 E9/L (ref 1.5–4)
LYMPHOCYTES RELATIVE PERCENT: 20.2 % (ref 20–42)
MCH RBC QN AUTO: 28.2 PG (ref 26–35)
MCHC RBC AUTO-ENTMCNC: 32.4 % (ref 32–34.5)
MCV RBC AUTO: 86.9 FL (ref 80–99.9)
MONOCYTES ABSOLUTE: 0.5 E9/L (ref 0.1–0.95)
MONOCYTES RELATIVE PERCENT: 10.8 % (ref 2–12)
NEUTROPHILS ABSOLUTE: 3.04 E9/L (ref 1.8–7.3)
NEUTROPHILS RELATIVE PERCENT: 65.4 % (ref 43–80)
PDW BLD-RTO: 11.7 FL (ref 11.5–15)
PLATELET # BLD: 195 E9/L (ref 130–450)
PMV BLD AUTO: 9.3 FL (ref 7–12)
RBC # BLD: 5.43 E12/L (ref 3.5–5.5)
TOTAL IRON BINDING CAPACITY: 339 MCG/DL (ref 250–450)
WBC # BLD: 4.7 E9/L (ref 4.5–11.5)

## 2023-02-08 PROCEDURE — 82728 ASSAY OF FERRITIN: CPT

## 2023-02-08 PROCEDURE — 83540 ASSAY OF IRON: CPT

## 2023-02-08 PROCEDURE — 83550 IRON BINDING TEST: CPT

## 2023-02-08 PROCEDURE — 85025 COMPLETE CBC W/AUTO DIFF WBC: CPT

## 2023-02-08 PROCEDURE — 36415 COLL VENOUS BLD VENIPUNCTURE: CPT

## 2023-02-09 ENCOUNTER — OFFICE VISIT (OUTPATIENT)
Dept: ONCOLOGY | Age: 68
End: 2023-02-09
Payer: COMMERCIAL

## 2023-02-09 ENCOUNTER — TELEPHONE (OUTPATIENT)
Dept: INFUSION THERAPY | Age: 68
End: 2023-02-09

## 2023-02-09 VITALS
TEMPERATURE: 97.1 F | DIASTOLIC BLOOD PRESSURE: 82 MMHG | HEIGHT: 66 IN | BODY MASS INDEX: 44.52 KG/M2 | SYSTOLIC BLOOD PRESSURE: 124 MMHG | HEART RATE: 73 BPM | WEIGHT: 277 LBS | OXYGEN SATURATION: 96 %

## 2023-02-09 DIAGNOSIS — D72.819 LEUKOPENIA, UNSPECIFIED TYPE: Primary | ICD-10-CM

## 2023-02-09 DIAGNOSIS — R71.8 MICROCYTOSIS: ICD-10-CM

## 2023-02-09 DIAGNOSIS — I26.99 OTHER ACUTE PULMONARY EMBOLISM, UNSPECIFIED WHETHER ACUTE COR PULMONALE PRESENT (HCC): ICD-10-CM

## 2023-02-09 PROCEDURE — 99212 OFFICE O/P EST SF 10 MIN: CPT

## 2023-02-09 PROCEDURE — 99214 OFFICE O/P EST MOD 30 MIN: CPT | Performed by: INTERNAL MEDICINE

## 2023-02-09 PROCEDURE — 1123F ACP DISCUSS/DSCN MKR DOCD: CPT | Performed by: INTERNAL MEDICINE

## 2023-02-09 NOTE — PROGRESS NOTES
[id number] [facility]  [place] [address]  Dept: [phone]  Loc: [phone]  Attending progress note      Reason for Visit:   Leukopenia, history of PE. Referring Physician: Luzma Madera CNP    PCP:  George Zamorano DO    History of Present Illness: The patient is a very pleasant 53-year-old lady,  with a PMH significant for hyperlipedemia, obesity, OA, I met the patient initially in 2016 after diagnosis with PE,she underwent on 1/29/2016 a left total hip arthroplasty, the patient was on Xarelto for about 2 weeks following her surgery. On 3/8/2016 the patient started having chest pain, shortness of breath, palpitations and lightheadedness, she was seen at the ED on 3/9/2916, had a CTA chest done revealing Extensive filling defects involve the distal central right and left pulmonary arteries, in addition to the bilateral upper and lower lobe and right middle lobe pulmonary arteries. Findings are compatible with pulmonary emboli. She had b/l LE venous ultrasounds done, were negative for DVTs. The patient was on Xarelto for 1 year, it was discontinued on 3/22/2017. No recurrent DVTs. She continues to be on baby aspirin. The patient has a history of intermittent leukopenia, her CBCD from 2/4/2021 was remarkable for a white count of 4.3, ANC 2130, hemoglobin 11.6, hematocrit 37.1, MCV 78.1, platelets 574D. The patient was donating blood about once every 8 weeks. The patient presents for a follow-up visit, doing well, no bleeding or clotting, she is on oral iron once daily, she did not donate blood since her last visit. Review of Systems;  CONSTITUTIONAL: No fever, chills. Good appetite, positive for weight loss. ENMT: Eyes: No diplopia; Nose: No epistaxis. Mouth: No sore throat. RESPIRATORY: No hemoptysis, positive for shortness of breath from asthma. CARDIOVASCULAR: No chest pain, palpitations.   GASTROINTESTINAL: No nausea/vomiting, abdominal pain, diarrhea/constipation. GENITOURINARY: No dysuria, urinary frequency, hematuria. NEURO: No syncope, presyncope, headache.   Remainder:  ROS NEGATIVE    Past Medical History:      Diagnosis Date    Anticoagulant long-term use 03/2016    Xarelto    Asthma     Fatigue     H/O cardiovascular stress test 03/04/2020    Hemorrhoids     Hiatal hernia     Hx of blood clots 03/2016    pulmonary embolism    Hyperlipidemia     Hypertension     Obesity     morbid    Osteoarthritis     Postoperative surgical complication involving digestive system     Wears dentures     permanent upper bridge front right tooth, next 2 on right     Patient Active Problem List   Diagnosis    Primary osteoarthritis of left hip    Acute pulmonary embolism (HCC)    Hyperlipidemia    Obesity    Family hx of colon cancer requiring screening colonoscopy    IZQUIERDO (dyspnea on exertion)    Fatigue        Past Surgical History:      Procedure Laterality Date    APPENDECTOMY      CARPAL TUNNEL RELEASE      left    COLONOSCOPY  10/26/2016    Dr. Melvin Ascencio, Findings: Normal Colon to the cecum - repeat in 10 years    CYST REMOVAL      EYE SURGERY Left 2016    retina tear    GASTRIC BAND  2/27/2007    Lap; 10 cm band; with hiatal hernia repair    JOINT REPLACEMENT Left 1/29/2016    total hip    JOINT REPLACEMENT Bilateral     knees    OTHER SURGICAL HISTORY  12/05/2016    laparscopic gastric band removal and hiatal hernia repair    OVARY REMOVAL Right     right due to cyst    TOTAL KNEE ARTHROPLASTY Bilateral 2012    UPPER GASTROINTESTINAL ENDOSCOPY  2007    UPPER GASTROINTESTINAL ENDOSCOPY  10/26/2016    Dr. Melvin Ascencio, Findings: GE Reflux, 2.5cm Hiatal Hernia with the slipped stomach up in the chest       Family History:  Family History   Problem Relation Age of Onset    Cancer Father 39        bladder    No Known Problems Mother     No Known Problems Sister     No Known Problems Brother     No Known Problems Maternal Aunt     No Known Problems Maternal Uncle     Cancer Paternal Aunt         lung    No Known Problems Paternal Uncle     No Known Problems Maternal Grandmother     No Known Problems Maternal Grandfather     No Known Problems Paternal Grandmother     No Known Problems Paternal Grandfather     Mult Sclerosis Paternal Cousin        Medications:  Reviewed and reconciled. Social History:  Social History     Socioeconomic History    Marital status: Single     Spouse name: Not on file    Number of children: Not on file    Years of education: Not on file    Highest education level: Not on file   Occupational History    Not on file   Tobacco Use    Smoking status: Never    Smokeless tobacco: Never   Vaping Use    Vaping Use: Never used   Substance and Sexual Activity    Alcohol use: No     Comment: 1 to 2 times per year. Hot tea andice tea 1 cup a day. Drug use: No    Sexual activity: Never   Other Topics Concern    Not on file   Social History Narrative    Not on file     Social Determinants of Health     Financial Resource Strain: Not on file   Food Insecurity: Not on file   Transportation Needs: Not on file   Physical Activity: Not on file   Stress: Not on file   Social Connections: Not on file   Intimate Partner Violence: Not on file   Housing Stability: Not on file       Allergies:  No Known Allergies    Physical Exam:  /82   Pulse 73   Temp 97.1 °F (36.2 °C)   Ht 5' 5.75\" (1.67 m)   Wt 277 lb (125.6 kg)   SpO2 96%   BMI 45.05 kg/m²   GENERAL: Alert, oriented x 3, not in acute distress. HEENT: PERRLA; EOMI. Oropharynx clear. NECK: Supple. No palpable cervical or supraclavicular lymphadenopathy. LUNGS: Good air entry bilaterally. No wheezing, crackles or rhonchi. CARDIOVASCULAR: Regular rate. No murmurs, rubs or gallops. ABDOMEN: Soft. Non-tender, non-distended. Positive bowel sounds. EXTREMITIES: Without clubbing, cyanosis, or edema. NEUROLOGIC: No focal deficits.    ECOG PS 1    Impression/Plan: The patient is a very pleasant 60-year-old lady,  with a PMH significant for hyperlipedemia, obesity, OA, I met the patient initially in 2016 after diagnosis with PE,she underwent on 1/29/2016 a left total hip arthroplasty, the patient was on Xarelto for about 2 weeks following her surgery. On 3/8/2016 the patient started having chest pain, shortness of breath, palpitations and lightheadedness, she was seen at the ED on 3/9/2916, had a CTA chest done revealing Extensive filling defects involve the distal central right and left pulmonary arteries, in addition to the bilateral upper and lower lobe and right middle lobe pulmonary arteries. Findings are compatible with pulmonary emboli. She had b/l LE venous ultrasounds done, were negative for DVTs. The patient had a provoked event by a transient surgical risk factor, given the fact that she had extensive bilateral PEs, recommended anticoagulation with Xarelto for a year, repeat chest CT was done revealing complete resolution of the PEs, Xarelto was discontinued on 3/22/2017. She was started on baby aspirin. No recurrent DVTs. She continues to be on baby aspirin. Continue weight loss, the patient might have hip surgery done, will need prophylactic anticoagulation after. The patient had mild leukopenia, without lymphocytopenia or neutropenia, most likely was reactive,,ordered a work-up to evaluate for chronic viral infections, autoimmune etiology, and nutritional deficiencies, JOHNNA is negative, no hepatitis or HIV infections, no vitamin B12 or folate deficiency, peripheral blood flow cytometry is unremarkable. Her white count had normalized, ALC had improved. She has persistent lymphocytopenia, likely reactive, will monitor her counts. Mild microcytic anemia, fit test was negative, she is up-to-date with colonoscopies.   Most likely this is secondary to blood donation, the patient was started on oral iron, her hemoglobin/hematocrit had normalized, iron panel is normal, I recommended oral iron once daily if she is to do blood donation, we will continue to monitor her counts and iron panel. RTC in about 6 months    Thank you for allowing us to participate in the care of Mrs. Wallace.     Debby Tompkins MD   HEMATOLOGY/MEDICAL [address] [address] [address]  [address] [address]  Dept: [place]: [phone]

## 2023-02-23 ENCOUNTER — OFFICE VISIT (OUTPATIENT)
Dept: BARIATRICS/WEIGHT MGMT | Age: 68
End: 2023-02-23
Payer: COMMERCIAL

## 2023-02-23 VITALS
SYSTOLIC BLOOD PRESSURE: 121 MMHG | TEMPERATURE: 97.4 F | DIASTOLIC BLOOD PRESSURE: 73 MMHG | BODY MASS INDEX: 44.2 KG/M2 | WEIGHT: 275 LBS | HEART RATE: 76 BPM | HEIGHT: 66 IN

## 2023-02-23 DIAGNOSIS — E66.9 DIABETES MELLITUS TYPE 2 IN OBESE (HCC): Primary | ICD-10-CM

## 2023-02-23 DIAGNOSIS — E11.69 DIABETES MELLITUS TYPE 2 IN OBESE (HCC): Primary | ICD-10-CM

## 2023-02-23 PROCEDURE — 99211 OFF/OP EST MAY X REQ PHY/QHP: CPT | Performed by: INTERNAL MEDICINE

## 2023-02-23 PROCEDURE — 99214 OFFICE O/P EST MOD 30 MIN: CPT | Performed by: INTERNAL MEDICINE

## 2023-02-23 PROCEDURE — 1123F ACP DISCUSS/DSCN MKR DOCD: CPT | Performed by: INTERNAL MEDICINE

## 2023-02-23 RX ORDER — SEMAGLUTIDE 1.34 MG/ML
0.5 INJECTION, SOLUTION SUBCUTANEOUS WEEKLY
Qty: 2 ADJUSTABLE DOSE PRE-FILLED PEN SYRINGE | Refills: 2 | Status: SHIPPED | OUTPATIENT
Start: 2023-02-23

## 2023-02-23 NOTE — PROGRESS NOTES
CC -   Follow up of: Fatigue all the time over the last 2 years, Weight gain, exercise intolerance. BACKGROUND -   Last visit: 10/28/22  First visit: 1/12/22    Obesity   Began in childhood  Initial BMI 53.7, Wt 330.2 lbs  HS Grad wt 200 lbs  Lowest   wt 160 lbs (in high school)  Highest  wt 330 lbs  Pattern of wt gain: gradual, but has accelerated since 2020, attributes to decreased activity due to pain  Wt change past yr: +50 lbs  Most wt lost: about 50 lbs (after lap band 2007)  Other diets attempted: Had lap band that was removed after it slipped in 2016, otherwise no specific program, but tried to watch diet    Desire to lose weight: 9/10  Problem posed by appetite: 4/10    Initial Diet:    Number of meals per day - 3    Number of snacks per day - 1-2    Meal volume - 12\" plate, occasionally seconds    Fast food/convenience store - 1x/week    Restaurants (not fast food) - 0-1x/week   Sweets - 1d/week (candy bars or cakes)   Chips - 1d/week   Crackers/pretzels - 0d/week   Nuts - 0d/week   Peanut Butter - 0d/week   Popcorn - 1d/week   Dried fruit - 0d/week   Whole fruit - 3d/week -  - bananas/oranges/grapes   Breakfast cereal - 0d/week   Granola/Protein/Energy bar - 0d/week   Sugar sweetened beverages - tea with 1 teaspoon of sugar daily   Protein - No supplements   Fiber - No supplements     Initial Exercise:    Gym membership - [employer]    Walking - no (pain, does some activity with dogs)    Running - no    Resistance - no    Aerobic class - no        Initial Sleep: bedtime: 9-9:30 pm, wakes up at 3 and then falls back to sleep and wakes up at 6 am, wakes up tired.  More awake in the morning, no daytime naps.    ______________________    [address] [place] [address] -    Past Medical History:   Diagnosis Date    Anticoagulant long-term use 03/2016    Xarelto    Asthma     Fatigue     H/O cardiovascular stress test 03/04/2020    Hemorrhoids     Hiatal hernia     Hx of blood clots 03/2016    pulmonary embolism Hyperlipidemia     Hypertension     Obesity     morbid    Osteoarthritis     Postoperative surgical complication involving digestive system     Wears dentures     permanent upper bridge front right tooth, next 2 on right     Past Surgical History:   Procedure Laterality Date    APPENDECTOMY      CARPAL TUNNEL RELEASE      left    COLONOSCOPY  10/26/2016    Dr. vEan Carreon, Findings: Normal Colon to the cecum - repeat in 10 years    CYST REMOVAL      EYE SURGERY Left 2016    retina tear    GASTRIC BAND  2/27/2007    Lap; 10 cm band; with hiatal hernia repair    JOINT REPLACEMENT Left 1/29/2016    total hip    JOINT REPLACEMENT Bilateral     knees    OTHER SURGICAL HISTORY  12/05/2016    laparscopic gastric band removal and hiatal hernia repair    OVARY REMOVAL Right     right due to cyst    TOTAL KNEE ARTHROPLASTY Bilateral 2012    UPPER GASTROINTESTINAL ENDOSCOPY  2007    UPPER GASTROINTESTINAL ENDOSCOPY  10/26/2016    Dr. Evan Carreon, Findings: GE Reflux, 2.5cm Hiatal Hernia with the slipped stomach up in the chest       Current Outpatient Medications   Medication Sig Dispense Refill    Semaglutide-Weight Management (WEGOVY) 0.25 MG/0.5ML SOAJ SC injection Inject 0.25 mg into the skin every 7 days 2 mL 0    Semaglutide-Weight Management (WEGOVY) 0.5 MG/0.5ML SOAJ SC injection Inject 0.5 mg into the skin every 7 days 2 mL 0    VALSARTAN-HYDROCHLOROTHIAZIDE PO       famotidine (PEPCID) 20 MG tablet Take 20 mg by mouth daily      ferrous sulfate (IRON 325) 325 (65 Fe) MG tablet Take 1 tablet by mouth daily (with breakfast) 60 tablet 1    metFORMIN (GLUCOPHAGE-XR) 500 MG extended release tablet Take 500 mg by mouth Daily with supper      metoprolol tartrate (LOPRESSOR) 25 MG tablet Take 1 tablet by mouth 2 times daily 60 tablet 9    aspirin 81 MG EC tablet Take 1 tablet by mouth daily      nitroGLYCERIN (NITROSTAT) 0.4 MG SL tablet up to max of 3 total doses.  If no relief after 1 dose, call 911. 25 tablet 1    naproxen sodium (ANAPROX) 220 MG tablet Take 220 mg by mouth 2 times daily as needed for Pain      atorvastatin (LIPITOR) 20 MG tablet Take 20 mg by mouth nightly      acetaminophen (TYLENOL) 500 MG tablet Take 500 mg by mouth every 6 hours as needed for Pain      Multiple Vitamins-Calcium (ONE-A-DAY WOMENS PO) Take 1 tablet by mouth daily      Cholecalciferol 4000 UNITS CAPS Take 1 capsule by mouth daily       No current facility-administered medications for this visit. Metformin 500 bid  Valsartan + Metoprolol for HTN    8/26/22: Atorvastatin has been on hold. Valsartan-hctz has been cut in half, and metoprolol is continued 25 daily. Metformin 500mg xr daily. 10/28/22: Valsartan now 80 mg (was cutting in half prior). Family: no family history of DM or heart disease. No known allergies. Never smoked, rare alcohol. 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 Secretarial work. ROS -  Card - no CP  GI - no N/V/D/C  On and off depression    PE -  Gen : /73 (Site: Left Upper Arm, Position: Sitting, Cuff Size: Large Adult)   Pulse 76   Temp 97.4 °F (36.3 °C)   Ht 5' 5.75\" (1.67 m)   Wt 275 lb (124.7 kg)   BMI 44.72 kg/m²    WN, WD, NAD  Lung: Nml resp effort, CTA b/l. Heart: s1 s2 rrr, no murmur noted, trace pedal edema noted  Psych: Normal mood   Full affect  Neuro: Moves all ext well  ______________________    HISTORY & ASSESSMENT/PLAN -     Problem 1  - Fatigue   HPI   - chronic, not as tired as before, still some tiredness in the evening. Assessment  - improving gradually  Plan   - Weight reduction can help. Weight reduction per plan below.      Problem 2  - Obesity   HPI   - See above Background for description    Weight  Date    330.2 lbs 01/12/22    310.8 lbs 2/11/22    295.8  4/8/22    278.2  6/24/22    269.0  8/26/22   Topiramate in the evening    267.0  10/28/22 Topiramate to 50 mg qhs    279.8  1/5/23     Ozempic    275.0  2/23/23   Ozempic 0.5        Total weight change to date: -55.2 lbs  Average daily energy variance:   01/12/2022 - 2/11/2022: -19.5 lbs (68,250 Jacky)/30 d = -2,275 Jacky/d deficit. 2/11/2022 - 4/8/2022: -15 lbs (60801 Jacky)/55 d = -955 Jacky/d deficit. 4/8/2022 - 6/24/2022: -17.6 lbs (78115 Jacky)/76 d = -811 Jacky/d deficit. 6/24/2022 - 8/26/2022: -9.2 lbs (14194 Jacky)/63 d = -511 Jacky/d deficit. 8/26/2022 - 10/28/2022: -2.0 lbs (7000 Jacky)/63 d = -111 Jacky/d deficit. 10/28/2022 - 1/5/2023: +12.8 lbs (65892 Jacky)/69 d = +649 Jacky/d excess. 1/5/2023 - 2/23/2023: -4.8 lbs (72994 Jacky)/49 d = -343 Jacky/d deficit. DEN = 2050 Jacky/d    8/26/22: Patient's estimated daily energy need (DEN) = 1927 Jacky/d = 66389 Jacky/wk    Update:  VLCD - yogurt in am with bran cereal and smt protein shake instead, supper with chicken and vegetables. Sweets: 1d/month [facility])  SSB: none except sugar in tea  Restaurant food: 0x/wk  Appetite suppressant: Ozempic: 0.25 mg weekly, some queasiness on the night of injection,  appetite suppresion ~6/10  Protein: protein shakes  Fiber: fiber one cereal  Water: >96 oz/day. Activities: taking dogs for walk, household chores, hip pain limiting    Assessment  - improving    Plan   - she is doing very well with current plan and would like to continue. Ozempic is helping and we planned to continue but increase the dose. Planned as follows  Patient Instructions   Rules:  2 protein shakes (see below), and a frozen meal in a day as discussed (lean cuisine, healthy choice etc). Drink at least 2 liters of water in a day. Requirements:  Make sure protein intake is at least 60 grams per day  Make sure that fiber intake is at least 22 grams per day. Take about 22 almonds a day. Take one multivitamin every day     Targets:  Limit calorie intake to 1000 calories/day. Walk 30 minutes daily  Avoid eating 2 hours within bedtime.       Tips:  Do not eat outside of the dining room or the kitchen  Do not eat while watching TV, videos, working on the computer or using a smart phone  Do not eat food out of a multi-serving bag or container. Ozempic:  Increase Ozempic to 0.5 mg once a week after 4 doses of 0.25 mg. Follow up in 2-3 months. Medication management: Jamie Lim MD  Obesity Medicine  2/23/2023.

## 2023-02-23 NOTE — PATIENT INSTRUCTIONS
Rules:  2 protein shakes (see below), and a frozen meal in a day as discussed (lean cuisine, healthy choice etc).  Drink at least 2 liters of water in a day.     Requirements:  Make sure protein intake is at least 60 grams per day  Make sure that fiber intake is at least 22 grams per day.  Take about 22 almonds a day.  Take one multivitamin every day     Targets:  Limit calorie intake to 1000 calories/day.  Walk 30 minutes daily  Avoid eating 2 hours within bedtime.      Tips:  Do not eat outside of the dining room or the kitchen  Do not eat while watching TV, videos, working on the computer or using a smart phone  Do not eat food out of a multi-serving bag or container.    Ozempic:  Increase Ozempic to 0.5 mg once a week after 4 doses of 0.25 mg.    Follow up in 2-3 months.

## 2023-05-23 DIAGNOSIS — E66.9 DIABETES MELLITUS TYPE 2 IN OBESE (HCC): ICD-10-CM

## 2023-05-23 DIAGNOSIS — E11.69 DIABETES MELLITUS TYPE 2 IN OBESE (HCC): ICD-10-CM

## 2023-06-08 RX ORDER — SEMAGLUTIDE 1.34 MG/ML
0.5 INJECTION, SOLUTION SUBCUTANEOUS WEEKLY
Qty: 2 ADJUSTABLE DOSE PRE-FILLED PEN SYRINGE | Refills: 2 | OUTPATIENT
Start: 2023-06-08

## 2023-07-03 ENCOUNTER — HOSPITAL ENCOUNTER (OUTPATIENT)
Age: 68
Discharge: HOME | End: 2023-07-03
Payer: COMMERCIAL

## 2023-07-03 LAB
ALBUMIN SERPL-MCNC: 3.9 G/DL (ref 3.5–5.2)
ALP SERPL-CCNC: 64 U/L (ref 35–104)
ALT SERPL-CCNC: 14 U/L (ref 0–32)
ANION GAP SERPL CALCULATED.3IONS-SCNC: 9 MMOL/L (ref 7–16)
AST SERPL-CCNC: 14 U/L (ref 0–31)
BASOPHILS # BLD: 0.03 E9/L (ref 0–0.2)
BASOPHILS NFR BLD: 0.7 % (ref 0–2)
BILIRUB SERPL-MCNC: 0.5 MG/DL (ref 0–1.2)
BUN SERPL-MCNC: 26 MG/DL (ref 6–23)
CALCIUM SERPL-MCNC: 10.2 MG/DL (ref 8.6–10.2)
CHLORIDE SERPL-SCNC: 104 MMOL/L (ref 98–107)
CHOLESTEROL, FASTING: 163 MG/DL (ref 0–199)
CO2 SERPL-SCNC: 27 MMOL/L (ref 22–29)
CREAT SERPL-MCNC: 0.8 MG/DL (ref 0.5–1)
EOSINOPHIL # BLD: 0.2 E9/L (ref 0.05–0.5)
EOSINOPHIL NFR BLD: 4.4 % (ref 0–6)
ERYTHROCYTE [DISTWIDTH] IN BLOOD BY AUTOMATED COUNT: 12.2 FL (ref 11.5–15)
GLUCOSE FASTING: 133 MG/DL (ref 74–99)
HBA1C MFR BLD: 5.8 % (ref 4–5.6)
HCT VFR BLD AUTO: 42.6 % (ref 34–48)
HDLC SERPL-MCNC: 59 MG/DL
HGB BLD-MCNC: 13.7 G/DL (ref 11.5–15.5)
IMM GRANULOCYTES # BLD: 0.01 E9/L
IMM GRANULOCYTES NFR BLD: 0.2 % (ref 0–5)
LDL CHOLESTEROL CALCULATED: 83 MG/DL (ref 0–99)
LYMPHOCYTES # BLD: 1.3 E9/L (ref 1.5–4)
LYMPHOCYTES NFR BLD: 28.8 % (ref 20–42)
MCH RBC QN AUTO: 28.1 PG (ref 26–35)
MCHC RBC AUTO-ENTMCNC: 32.2 % (ref 32–34.5)
MCV RBC AUTO: 87.5 FL (ref 80–99.9)
MONOCYTES # BLD: 0.43 E9/L (ref 0.1–0.95)
MONOCYTES NFR BLD: 9.5 % (ref 2–12)
NEUTROPHILS # BLD: 2.55 E9/L (ref 1.8–7.3)
NEUTS SEG NFR BLD: 56.4 % (ref 43–80)
PLATELET # BLD AUTO: 198 E9/L (ref 130–450)
PMV BLD AUTO: 9.2 FL (ref 7–12)
POTASSIUM SERPL-SCNC: 4.5 MMOL/L (ref 3.5–5)
PROT SERPL-MCNC: 6.4 G/DL (ref 6.4–8.3)
RBC # BLD AUTO: 4.87 E12/L (ref 3.5–5.5)
SODIUM SERPL-SCNC: 140 MMOL/L (ref 132–146)
TRIGLYCERIDE, FASTING: 106 MG/DL (ref 0–149)
TSH SERPL-MCNC: 1.59 UIU/ML (ref 0.27–4.2)
VLDLC SERPL CALC-MCNC: 21 MG/DL
WBC # BLD: 4.5 E9/L (ref 4.5–11.5)

## 2023-07-03 PROCEDURE — 84443 ASSAY THYROID STIM HORMONE: CPT

## 2023-07-03 PROCEDURE — 36415 COLL VENOUS BLD VENIPUNCTURE: CPT

## 2023-07-03 PROCEDURE — 85025 COMPLETE CBC W/AUTO DIFF WBC: CPT

## 2023-07-03 PROCEDURE — 80053 COMPREHEN METABOLIC PANEL: CPT

## 2023-07-03 PROCEDURE — 80061 LIPID PANEL: CPT

## 2023-07-03 PROCEDURE — 83036 HEMOGLOBIN GLYCOSYLATED A1C: CPT

## 2023-07-13 ENCOUNTER — TRANSCRIBE ORDERS (OUTPATIENT)
Dept: ADMINISTRATIVE | Age: 68
End: 2023-07-13

## 2023-07-13 DIAGNOSIS — Z12.31 SCREENING MAMMOGRAM, ENCOUNTER FOR: Primary | ICD-10-CM

## 2023-07-14 ENCOUNTER — HOSPITAL ENCOUNTER (OUTPATIENT)
Dept: GENERAL RADIOLOGY | Age: 68
End: 2023-07-14
Payer: COMMERCIAL

## 2023-07-14 ENCOUNTER — HOSPITAL ENCOUNTER (OUTPATIENT)
Age: 68
Discharge: HOME | End: 2023-07-14
Payer: COMMERCIAL

## 2023-07-14 DIAGNOSIS — M16.11 UNILATERAL PRIMARY OSTEOARTHRITIS, RIGHT HIP: ICD-10-CM

## 2023-07-14 PROCEDURE — 73502 X-RAY EXAM HIP UNI 2-3 VIEWS: CPT

## 2023-07-28 ENCOUNTER — HOSPITAL ENCOUNTER (OUTPATIENT)
Dept: MAMMOGRAPHY | Age: 68
Discharge: HOME | End: 2023-07-28
Payer: COMMERCIAL

## 2023-07-28 VITALS — BODY MASS INDEX: 44.2 KG/M2 | HEIGHT: 66 IN | WEIGHT: 275 LBS

## 2023-07-28 DIAGNOSIS — Z12.31 SCREENING MAMMOGRAM, ENCOUNTER FOR: ICD-10-CM

## 2023-07-28 PROCEDURE — 77063 BREAST TOMOSYNTHESIS BI: CPT

## 2023-08-04 ENCOUNTER — CLINICAL DOCUMENTATION (OUTPATIENT)
Dept: MAMMOGRAPHY | Age: 68
End: 2023-08-04

## 2023-08-04 NOTE — PROGRESS NOTES
Self referral mammogram clinical report viewed by patient via [address] [place] [address]. Clinical report also faxed to Dr. Sandi Calvillo per signed patient authorization release of mammogram results form. Successful fax confirmation and scanned into media.  KIRTI ChairezN, RN - Breast Navigator

## 2023-08-08 DIAGNOSIS — D72.819 LEUKOPENIA, UNSPECIFIED TYPE: Primary | ICD-10-CM

## 2023-08-09 ENCOUNTER — HOSPITAL ENCOUNTER (OUTPATIENT)
Dept: INFUSION THERAPY | Age: 68
Discharge: HOME | End: 2023-08-09
Payer: COMMERCIAL

## 2023-08-09 DIAGNOSIS — D72.819 LEUKOPENIA, UNSPECIFIED TYPE: ICD-10-CM

## 2023-08-09 LAB
ALBUMIN SERPL-MCNC: 4.2 G/DL (ref 3.5–5.2)
ALP SERPL-CCNC: 62 U/L (ref 35–104)
ALT SERPL-CCNC: 13 U/L (ref 0–32)
ANION GAP SERPL CALCULATED.3IONS-SCNC: 10 MMOL/L (ref 7–16)
AST SERPL-CCNC: 15 U/L (ref 0–31)
BASOPHILS # BLD: 0.04 K/UL (ref 0–0.2)
BASOPHILS NFR BLD: 1 % (ref 0–2)
BILIRUB SERPL-MCNC: 0.4 MG/DL (ref 0–1.2)
BUN SERPL-MCNC: 23 MG/DL (ref 6–23)
CALCIUM SERPL-MCNC: 10.6 MG/DL (ref 8.6–10.2)
CHLORIDE SERPL-SCNC: 102 MMOL/L (ref 98–107)
CO2 SERPL-SCNC: 27 MMOL/L (ref 22–29)
CREAT SERPL-MCNC: 0.9 MG/DL (ref 0.5–1)
EOSINOPHIL # BLD: 0.17 K/UL (ref 0.05–0.5)
EOSINOPHILS RELATIVE PERCENT: 4 % (ref 0–6)
ERYTHROCYTE [DISTWIDTH] IN BLOOD BY AUTOMATED COUNT: 11.9 % (ref 11.5–15)
FERRITIN SERPL-MCNC: 67 NG/ML
GFR SERPL CREATININE-BSD FRML MDRD: >60 ML/MIN/1.73M2
GLUCOSE SERPL-MCNC: 125 MG/DL (ref 74–99)
HCT VFR BLD AUTO: 44.3 % (ref 34–48)
HGB BLD-MCNC: 14.5 G/DL (ref 11.5–15.5)
IMM GRANULOCYTES # BLD AUTO: <0.03 K/UL (ref 0–0.58)
IMM GRANULOCYTES NFR BLD: 0 % (ref 0–5)
IRON SATN MFR SERPL: 19 % (ref 15–50)
IRON SERPL-MCNC: 61 UG/DL (ref 37–145)
LYMPHOCYTES NFR BLD: 1.27 K/UL (ref 1.5–4)
LYMPHOCYTES RELATIVE PERCENT: 27 % (ref 20–42)
MCH RBC QN AUTO: 28.5 PG (ref 26–35)
MCHC RBC AUTO-ENTMCNC: 32.7 G/DL (ref 32–34.5)
MCV RBC AUTO: 87 FL (ref 80–99.9)
MONOCYTES NFR BLD: 0.36 K/UL (ref 0.1–0.95)
MONOCYTES NFR BLD: 8 % (ref 2–12)
NEUTROPHILS NFR BLD: 61 % (ref 43–80)
NEUTS SEG NFR BLD: 2.88 K/UL (ref 1.8–7.3)
PLATELET # BLD AUTO: 212 K/UL (ref 130–450)
PMV BLD AUTO: 9.2 FL (ref 7–12)
POTASSIUM SERPL-SCNC: 4.4 MMOL/L (ref 3.5–5)
PROT SERPL-MCNC: 7 G/DL (ref 6.4–8.3)
RBC # BLD AUTO: 5.09 M/UL (ref 3.5–5.5)
SODIUM SERPL-SCNC: 139 MMOL/L (ref 132–146)
TIBC SERPL-MCNC: 317 UG/DL (ref 250–450)
WBC OTHER # BLD: 4.7 K/UL (ref 4.5–11.5)

## 2023-08-09 PROCEDURE — 83540 ASSAY OF IRON: CPT

## 2023-08-09 PROCEDURE — 82728 ASSAY OF FERRITIN: CPT

## 2023-08-09 PROCEDURE — 36415 COLL VENOUS BLD VENIPUNCTURE: CPT

## 2023-08-09 PROCEDURE — 80053 COMPREHEN METABOLIC PANEL: CPT

## 2023-08-09 PROCEDURE — 85025 COMPLETE CBC W/AUTO DIFF WBC: CPT

## 2023-08-09 PROCEDURE — 83550 IRON BINDING TEST: CPT

## 2023-08-14 ENCOUNTER — OFFICE VISIT (OUTPATIENT)
Dept: ONCOLOGY | Age: 68
End: 2023-08-14
Payer: COMMERCIAL

## 2023-08-14 VITALS
DIASTOLIC BLOOD PRESSURE: 85 MMHG | HEART RATE: 68 BPM | SYSTOLIC BLOOD PRESSURE: 150 MMHG | OXYGEN SATURATION: 96 % | TEMPERATURE: 97.3 F | BODY MASS INDEX: 43.45 KG/M2 | RESPIRATION RATE: 18 BRPM | WEIGHT: 269.2 LBS

## 2023-08-14 DIAGNOSIS — E83.52 HYPERCALCEMIA: Primary | ICD-10-CM

## 2023-08-14 DIAGNOSIS — I26.99 OTHER ACUTE PULMONARY EMBOLISM, UNSPECIFIED WHETHER ACUTE COR PULMONALE PRESENT (HCC): ICD-10-CM

## 2023-08-14 DIAGNOSIS — R71.8 MICROCYTOSIS: ICD-10-CM

## 2023-08-14 DIAGNOSIS — D72.819 LEUKOPENIA, UNSPECIFIED TYPE: ICD-10-CM

## 2023-08-14 PROBLEM — E11.9 TYPE 2 DIABETES MELLITUS (HCC): Status: ACTIVE | Noted: 2023-08-14

## 2023-08-14 PROCEDURE — 99212 OFFICE O/P EST SF 10 MIN: CPT

## 2023-08-14 PROCEDURE — 99214 OFFICE O/P EST MOD 30 MIN: CPT | Performed by: INTERNAL MEDICINE

## 2023-08-14 PROCEDURE — 1123F ACP DISCUSS/DSCN MKR DOCD: CPT | Performed by: INTERNAL MEDICINE

## 2023-09-03 ENCOUNTER — HOSPITAL ENCOUNTER (OUTPATIENT)
Age: 68
Discharge: HOME | End: 2023-09-03
Payer: COMMERCIAL

## 2023-09-03 DIAGNOSIS — E83.52 HYPERCALCEMIA: ICD-10-CM

## 2023-09-03 LAB
ANION GAP SERPL CALCULATED.3IONS-SCNC: 11 MMOL/L (ref 7–16)
BUN SERPL-MCNC: 21 MG/DL (ref 6–23)
CALCIUM SERPL-MCNC: 9.7 MG/DL (ref 8.6–10.2)
CHLORIDE SERPL-SCNC: 103 MMOL/L (ref 98–107)
CO2 SERPL-SCNC: 26 MMOL/L (ref 22–29)
CREAT SERPL-MCNC: 0.8 MG/DL (ref 0.5–1)
GFR SERPL CREATININE-BSD FRML MDRD: >60 ML/MIN/1.73M2
GLUCOSE SERPL-MCNC: 112 MG/DL (ref 74–99)
POTASSIUM SERPL-SCNC: 4.2 MMOL/L (ref 3.5–5)
PTH-INTACT SERPL-MCNC: 25.9 PG/ML (ref 15–65)
SODIUM SERPL-SCNC: 140 MMOL/L (ref 132–146)

## 2023-09-03 PROCEDURE — 86334 IMMUNOFIX E-PHORESIS SERUM: CPT

## 2023-09-03 PROCEDURE — 83521 IG LIGHT CHAINS FREE EACH: CPT

## 2023-09-03 PROCEDURE — 84165 PROTEIN E-PHORESIS SERUM: CPT

## 2023-09-03 PROCEDURE — 83970 ASSAY OF PARATHORMONE: CPT

## 2023-09-03 PROCEDURE — 36415 COLL VENOUS BLD VENIPUNCTURE: CPT

## 2023-09-03 PROCEDURE — 82784 ASSAY IGA/IGD/IGG/IGM EACH: CPT

## 2023-09-03 PROCEDURE — 80048 BASIC METABOLIC PNL TOTAL CA: CPT

## 2023-09-03 PROCEDURE — 84155 ASSAY OF PROTEIN SERUM: CPT

## 2023-09-05 LAB
ALBUMIN SERPL-MCNC: 3.3 G/DL (ref 3.5–4.7)
ALPHA1 GLOB SERPL ELPH-MCNC: 0.3 G/DL (ref 0.2–0.4)
ALPHA2 GLOB SERPL ELPH-MCNC: 0.8 G/DL (ref 0.5–1)
B-GLOBULIN SERPL ELPH-MCNC: 1.1 G/DL (ref 0.8–1.3)
GAMMA GLOB SERPL ELPH-MCNC: 0.7 G/DL (ref 0.7–1.6)
IGA SERPL-MCNC: 420 MG/DL (ref 70–400)
IGG SERPL-MCNC: 590 MG/DL (ref 700–1600)
IGM SERPL-MCNC: 111 MG/DL (ref 40–230)
INTERPRETATION SERPL IFE-IMP: NORMAL
M PROTEIN SERPL ELPH-MCNC: 0.6 G/DL
PATH REV: NORMAL
PATHOLOGIST: ABNORMAL
PROT PATTERN SERPL ELPH-IMP: ABNORMAL
PROT SERPL-MCNC: 6.2 G/DL (ref 6.4–8.3)

## 2023-09-06 LAB
FREE KAPPA/LAMBDA RATIO: 0.6 (ref 0.26–1.65)
KAPPA LC FREE SER-MCNC: 11.3 MG/L (ref 3.7–19.4)
LAMBDA LC FREE SERPL-MCNC: 18.7 MG/L (ref 5.7–26.3)

## 2023-09-07 LAB
INTERPRETATION SERPL IFE-IMP: NORMAL
PATHOLOGY STUDY: NORMAL

## 2023-10-22 ENCOUNTER — HOSPITAL ENCOUNTER (OUTPATIENT)
Age: 68
Discharge: HOME | End: 2023-10-22
Payer: COMMERCIAL

## 2023-10-22 LAB
ALBUMIN SERPL-MCNC: 4.2 G/DL (ref 3.5–5.2)
ALP SERPL-CCNC: 59 U/L (ref 35–104)
ALT SERPL-CCNC: 16 U/L (ref 0–32)
ANION GAP SERPL CALCULATED.3IONS-SCNC: 8 MMOL/L (ref 7–16)
AST SERPL-CCNC: 18 U/L (ref 0–31)
BASOPHILS # BLD: 0.03 K/UL (ref 0–0.2)
BASOPHILS NFR BLD: 1 % (ref 0–2)
BILIRUB SERPL-MCNC: 0.7 MG/DL (ref 0–1.2)
BUN SERPL-MCNC: 23 MG/DL (ref 6–23)
CALCIUM SERPL-MCNC: 9.6 MG/DL (ref 8.6–10.2)
CHLORIDE SERPL-SCNC: 102 MMOL/L (ref 98–107)
CHOLEST SERPL-MCNC: 161 MG/DL
CO2 SERPL-SCNC: 28 MMOL/L (ref 22–29)
CREAT SERPL-MCNC: 0.8 MG/DL (ref 0.5–1)
EOSINOPHIL # BLD: 0.24 K/UL (ref 0.05–0.5)
EOSINOPHILS RELATIVE PERCENT: 5 % (ref 0–6)
ERYTHROCYTE [DISTWIDTH] IN BLOOD BY AUTOMATED COUNT: 12.5 % (ref 11.5–15)
GFR SERPL CREATININE-BSD FRML MDRD: >60 ML/MIN/1.73M2
GLUCOSE P FAST SERPL-MCNC: 123 MG/DL (ref 74–99)
HBA1C MFR BLD: 6.1 % (ref 4–5.6)
HCT VFR BLD AUTO: 41.9 % (ref 34–48)
HDLC SERPL-MCNC: 62 MG/DL
HGB BLD-MCNC: 13.7 G/DL (ref 11.5–15.5)
IMM GRANULOCYTES # BLD AUTO: <0.03 K/UL (ref 0–0.58)
IMM GRANULOCYTES NFR BLD: 0 % (ref 0–5)
LDLC SERPL CALC-MCNC: 77 MG/DL
LYMPHOCYTES NFR BLD: 1.04 K/UL (ref 1.5–4)
LYMPHOCYTES RELATIVE PERCENT: 23 % (ref 20–42)
MCH RBC QN AUTO: 28.3 PG (ref 26–35)
MCHC RBC AUTO-ENTMCNC: 32.7 G/DL (ref 32–34.5)
MCV RBC AUTO: 86.6 FL (ref 80–99.9)
MONOCYTES NFR BLD: 0.4 K/UL (ref 0.1–0.95)
MONOCYTES NFR BLD: 9 % (ref 2–12)
NEUTROPHILS NFR BLD: 62 % (ref 43–80)
NEUTS SEG NFR BLD: 2.83 K/UL (ref 1.8–7.3)
PLATELET # BLD AUTO: 208 K/UL (ref 130–450)
PMV BLD AUTO: 9.2 FL (ref 7–12)
POTASSIUM SERPL-SCNC: 4.2 MMOL/L (ref 3.5–5)
PROT SERPL-MCNC: 6.6 G/DL (ref 6.4–8.3)
RBC # BLD AUTO: 4.84 M/UL (ref 3.5–5.5)
SODIUM SERPL-SCNC: 138 MMOL/L (ref 132–146)
TRIGL SERPL-MCNC: 110 MG/DL
TSH SERPL DL<=0.05 MIU/L-ACNC: 1.14 UIU/ML (ref 0.27–4.2)
VLDLC SERPL CALC-MCNC: 22 MG/DL
WBC OTHER # BLD: 4.6 K/UL (ref 4.5–11.5)

## 2023-10-22 PROCEDURE — 80053 COMPREHEN METABOLIC PANEL: CPT

## 2023-10-22 PROCEDURE — 85025 COMPLETE CBC W/AUTO DIFF WBC: CPT

## 2023-10-22 PROCEDURE — 84443 ASSAY THYROID STIM HORMONE: CPT

## 2023-10-22 PROCEDURE — 36415 COLL VENOUS BLD VENIPUNCTURE: CPT

## 2023-10-22 PROCEDURE — 83036 HEMOGLOBIN GLYCOSYLATED A1C: CPT

## 2023-10-22 PROCEDURE — 80061 LIPID PANEL: CPT

## 2023-10-24 ENCOUNTER — HOSPITAL ENCOUNTER (OUTPATIENT)
Age: 68
Discharge: HOME | End: 2023-10-26
Payer: COMMERCIAL

## 2023-10-24 ENCOUNTER — HOSPITAL ENCOUNTER (OUTPATIENT)
Dept: GENERAL RADIOLOGY | Age: 68
Discharge: HOME | End: 2023-10-26
Payer: COMMERCIAL

## 2023-10-24 DIAGNOSIS — M25.561 ARTHRALGIA OF RIGHT KNEE: ICD-10-CM

## 2023-10-24 DIAGNOSIS — M25.461 EFFUSION OF RIGHT KNEE: ICD-10-CM
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
## 2023-10-24 PROCEDURE — 73560 X-RAY EXAM OF KNEE 1 OR 2: CPT

## 2023-11-10 ENCOUNTER — HOSPITAL ENCOUNTER (OUTPATIENT)
Dept: INFUSION THERAPY | Age: 68
Discharge: HOME | End: 2023-11-10
Payer: COMMERCIAL

## 2023-11-10 DIAGNOSIS — Z80.0 FAMILY HX OF COLON CANCER REQUIRING SCREENING COLONOSCOPY: Primary | ICD-10-CM

## 2023-11-10 LAB
BASOPHILS # BLD: 0.03 K/UL (ref 0–0.2)
BASOPHILS NFR BLD: 1 % (ref 0–2)
EOSINOPHIL # BLD: 0.17 K/UL (ref 0.05–0.5)
EOSINOPHILS RELATIVE PERCENT: 3 % (ref 0–6)
ERYTHROCYTE [DISTWIDTH] IN BLOOD BY AUTOMATED COUNT: 12.1 % (ref 11.5–15)
HCT VFR BLD AUTO: 46.7 % (ref 34–48)
HGB BLD-MCNC: 15.1 G/DL (ref 11.5–15.5)
IMM GRANULOCYTES # BLD AUTO: <0.03 K/UL (ref 0–0.58)
IMM GRANULOCYTES NFR BLD: 0 % (ref 0–5)
LYMPHOCYTES NFR BLD: 1.38 K/UL (ref 1.5–4)
LYMPHOCYTES RELATIVE PERCENT: 21 % (ref 20–42)
MCH RBC QN AUTO: 28.4 PG (ref 26–35)
MCHC RBC AUTO-ENTMCNC: 32.3 G/DL (ref 32–34.5)
MCV RBC AUTO: 87.9 FL (ref 80–99.9)
MONOCYTES NFR BLD: 0.51 K/UL (ref 0.1–0.95)
MONOCYTES NFR BLD: 8 % (ref 2–12)
NEUTROPHILS NFR BLD: 67 % (ref 43–80)
NEUTS SEG NFR BLD: 4.33 K/UL (ref 1.8–7.3)
PLATELET # BLD AUTO: 229 K/UL (ref 130–450)
PMV BLD AUTO: 9.7 FL (ref 7–12)
RBC # BLD AUTO: 5.31 M/UL (ref 3.5–5.5)
WBC OTHER # BLD: 6.4 K/UL (ref 4.5–11.5)

## 2023-11-10 PROCEDURE — 82728 ASSAY OF FERRITIN: CPT

## 2023-11-10 PROCEDURE — 36415 COLL VENOUS BLD VENIPUNCTURE: CPT

## 2023-11-10 PROCEDURE — 83550 IRON BINDING TEST: CPT

## 2023-11-10 PROCEDURE — 83540 ASSAY OF IRON: CPT

## 2023-11-10 PROCEDURE — 85025 COMPLETE CBC W/AUTO DIFF WBC: CPT

## 2023-11-13 ENCOUNTER — OFFICE VISIT (OUTPATIENT)
Dept: ONCOLOGY | Age: 68
End: 2023-11-13
Payer: COMMERCIAL

## 2023-11-13 VITALS
BODY MASS INDEX: 43.18 KG/M2 | SYSTOLIC BLOOD PRESSURE: 152 MMHG | WEIGHT: 267.5 LBS | TEMPERATURE: 97.1 F | DIASTOLIC BLOOD PRESSURE: 98 MMHG | HEART RATE: 83 BPM | RESPIRATION RATE: 16 BRPM | OXYGEN SATURATION: 97 %

## 2023-11-13 DIAGNOSIS — E83.52 HYPERCALCEMIA: ICD-10-CM

## 2023-11-13 DIAGNOSIS — D72.819 LEUKOPENIA, UNSPECIFIED TYPE: ICD-10-CM

## 2023-11-13 DIAGNOSIS — D47.2 MONOCLONAL GAMMOPATHY: Primary | ICD-10-CM

## 2023-11-13 DIAGNOSIS — R71.8 MICROCYTOSIS: ICD-10-CM

## 2023-11-13 LAB
IRON SATN MFR SERPL: 34 % (ref 15–50)
IRON SERPL-MCNC: 121 UG/DL (ref 37–145)
TIBC SERPL-MCNC: 359 UG/DL (ref 250–450)

## 2023-11-13 PROCEDURE — 99213 OFFICE O/P EST LOW 20 MIN: CPT

## 2023-11-13 PROCEDURE — 1123F ACP DISCUSS/DSCN MKR DOCD: CPT | Performed by: INTERNAL MEDICINE

## 2023-11-13 PROCEDURE — 99214 OFFICE O/P EST MOD 30 MIN: CPT | Performed by: INTERNAL MEDICINE

## 2023-11-13 NOTE — PROGRESS NOTES
weekly, which she will continue, we will continue to monitor her counts and iron panel. 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 RTC in about a month. Thank you for allowing us to participate in the care of Mrs. Wallace.     Josh Bolden MD   HEMATOLOGY/MEDICAL [place] [address]  Dept: [address]: [phone]

## 2023-11-16 LAB — FERRITIN SERPL-MCNC: 87 NG/ML

## 2023-11-18 ENCOUNTER — HOSPITAL ENCOUNTER (OUTPATIENT)
Dept: GENERAL RADIOLOGY | Age: 68
End: 2023-11-18
Payer: COMMERCIAL

## 2023-11-18 DIAGNOSIS — D47.2 MONOCLONAL GAMMOPATHY: ICD-10-CM

## 2023-11-18 PROCEDURE — 77075 RADEX OSSEOUS SURVEY COMPL: CPT

## 2023-12-11 ENCOUNTER — HOSPITAL ENCOUNTER (OUTPATIENT)
Dept: INFUSION THERAPY | Age: 68
Discharge: HOME | End: 2023-12-11
Payer: COMMERCIAL

## 2023-12-11 DIAGNOSIS — D47.2 MONOCLONAL GAMMOPATHY: ICD-10-CM

## 2023-12-11 LAB
IGA SERPL-MCNC: 445 MG/DL (ref 70–400)
IGG SERPL-MCNC: 602 MG/DL (ref 700–1600)
IGM SERPL-MCNC: 130 MG/DL (ref 40–230)

## 2023-12-11 PROCEDURE — 82784 ASSAY IGA/IGD/IGG/IGM EACH: CPT

## 2023-12-11 PROCEDURE — 83521 IG LIGHT CHAINS FREE EACH: CPT

## 2023-12-11 PROCEDURE — 84165 PROTEIN E-PHORESIS SERUM: CPT

## 2023-12-11 PROCEDURE — 36415 COLL VENOUS BLD VENIPUNCTURE: CPT

## 2023-12-11 PROCEDURE — 84155 ASSAY OF PROTEIN SERUM: CPT

## 2023-12-11 PROCEDURE — 82232 ASSAY OF BETA-2 PROTEIN: CPT

## 2023-12-11 PROCEDURE — 86334 IMMUNOFIX E-PHORESIS SERUM: CPT

## 2023-12-13 LAB
ALBUMIN SERPL-MCNC: 3 G/DL (ref 3.5–4.7)
ALPHA1 GLOB SERPL ELPH-MCNC: 0.3 G/DL (ref 0.2–0.4)
ALPHA2 GLOB SERPL ELPH-MCNC: 0.9 G/DL (ref 0.5–1)
B-GLOBULIN SERPL ELPH-MCNC: 1.3 G/DL (ref 0.8–1.3)
GAMMA GLOB SERPL ELPH-MCNC: 0.9 G/DL (ref 0.7–1.6)
INTERPRETATION SERPL IFE-IMP: NORMAL
M PROTEIN SERPL ELPH-MCNC: 0.5 G/DL
PATH REV: NORMAL
PATHOLOGIST: ABNORMAL
PROT PATTERN SERPL ELPH-IMP: ABNORMAL
PROT SERPL-MCNC: 6.4 G/DL (ref 6.4–8.3)

## 2023-12-14 LAB
B2 MICROGLOB SERPL IA-MCNC: 2.4 MG/L
FREE KAPPA/LAMBDA RATIO: 0.73 (ref 0.26–1.65)
KAPPA LC FREE SER-MCNC: 13.5 MG/L (ref 3.7–19.4)
LAMBDA LC FREE SERPL-MCNC: 18.6 MG/L (ref 5.7–26.3)

## 2024-01-09 ENCOUNTER — HOSPITAL ENCOUNTER (OUTPATIENT)
Dept: PREADMISSION TESTING | Age: 69
Discharge: HOME | End: 2024-01-09

## 2024-01-09 RX ORDER — SODIUM CHLORIDE 0.9 % (FLUSH) 0.9 %
5-40 SYRINGE (ML) INJECTION PRN
Status: CANCELLED | OUTPATIENT
Start: 2024-01-09

## 2024-01-09 NOTE — PROGRESS NOTES
[facility]                                                                                                                    PRE OP INSTRUCTIONS FOR  Shanae Wallace        Date: 1/9/2024    Date of surgery: 1/10/24   Arrival Time: 7 am    Nothing by mouth (NPO) as instructed._nothing solid after midnight, water only up to 4 hours prior to arrival___________________________________________________________________    Take the following medications with a small sip of water on the morning of Surgery: pt takes meds at night     Diabetics may take evening dose of insulin but none after midnight.  If you feel symptomatic or low blood sugar morning of surgery drink 1-2 ounces of apple juice only.    Aspirin, Ibuprofen, Advil, Naproxen, Vitamin E and other Anti-inflammatory products should be stopped  before surgery  as directed by your physician.  Take Tylenol only unless instructed otherwise by your surgeon.        Do not smoke,use illicit drugs and do not drink any alcoholic beverages 24 hours prior to surgery.    You may brush your teeth the morning of surgery.  DO NOT SWALLOW WATER    You MUST make arrangements for a responsible adult to take you home after your surgery. You will not be allowed to leave alone or drive yourself home.  It is strongly suggested someone stay with you the first 24 hrs. Your surgery will be cancelled if you do not have a ride home.      Please wear simple, loose fitting clothing to the hospital.  Do not bring valuables (money, credit cards, checkbooks, etc.) Do not wear any makeup (including no eye makeup) or nail polish on your fingers or toes.    DO NOT wear any jewelry or piercings on day of surgery.  All body piercing jewelry must be removed.    Shower the night before surgery with _x__Antibacterial soap       If you have a Living Will and Durable Power of  for Healthcare, please bring in a copy.    If appropriate bring crutches, inspirex, WALKER, CANE

## 2024-01-10 ENCOUNTER — HOSPITAL ENCOUNTER (OUTPATIENT)
Dept: CT IMAGING | Age: 69
Discharge: HOME | End: 2024-01-10
Payer: COMMERCIAL

## 2024-01-10 VITALS
HEART RATE: 63 BPM | BODY MASS INDEX: 42.75 KG/M2 | SYSTOLIC BLOOD PRESSURE: 106 MMHG | OXYGEN SATURATION: 95 % | RESPIRATION RATE: 16 BRPM | WEIGHT: 266 LBS | DIASTOLIC BLOOD PRESSURE: 60 MMHG | HEIGHT: 66 IN | TEMPERATURE: 97.3 F

## 2024-01-10 DIAGNOSIS — D47.2 MONOCLONAL GAMMOPATHY: ICD-10-CM

## 2024-01-10 LAB
ERYTHROCYTE [DISTWIDTH] IN BLOOD BY AUTOMATED COUNT: 11.9 % (ref 11.5–15)
GLUCOSE BLD-MCNC: 89 MG/DL (ref 74–99)
HCT VFR BLD AUTO: 43.6 % (ref 34–48)
HGB BLD-MCNC: 14.6 G/DL (ref 11.5–15.5)
INR PPP: 1.1
MCH RBC QN AUTO: 28.6 PG (ref 26–35)
MCHC RBC AUTO-ENTMCNC: 33.5 G/DL (ref 32–34.5)
MCV RBC AUTO: 85.5 FL (ref 80–99.9)
PARTIAL THROMBOPLASTIN TIME: 33.5 SEC (ref 24.5–35.1)
PLATELET # BLD AUTO: 192 K/UL (ref 130–450)
PMV BLD AUTO: 9.1 FL (ref 7–12)
PROTHROMBIN TIME: 12.1 SEC (ref 9.3–12.4)
RBC # BLD AUTO: 5.1 M/UL (ref 3.5–5.5)
WBC OTHER # BLD: 5.2 K/UL (ref 4.5–11.5)

## 2024-01-10 PROCEDURE — 38222 DX BONE MARROW BX & ASPIR: CPT

## 2024-01-10 PROCEDURE — 77012 CT SCAN FOR NEEDLE BIOPSY: CPT

## 2024-01-10 PROCEDURE — 85610 PROTHROMBIN TIME: CPT

## 2024-01-10 PROCEDURE — 6360000002 HC RX W HCPCS: Performed by: RADIOLOGY

## 2024-01-10 PROCEDURE — 85027 COMPLETE CBC AUTOMATED: CPT

## 2024-01-10 PROCEDURE — 82962 GLUCOSE BLOOD TEST: CPT

## 2024-01-10 PROCEDURE — 7100000010 HC PHASE II RECOVERY - FIRST 15 MIN

## 2024-01-10 PROCEDURE — 85730 THROMBOPLASTIN TIME PARTIAL: CPT

## 2024-01-10 PROCEDURE — 2580000003 HC RX 258: Performed by: RADIOLOGY

## 2024-01-10 RX ORDER — SODIUM CHLORIDE 0.9 % (FLUSH) 0.9 %
5-40 SYRINGE (ML) INJECTION PRN
Status: DISCONTINUED | OUTPATIENT
Start: 2024-01-10 | End: 2024-01-11 | Stop reason: HOSPADM

## 2024-01-10 RX ORDER — FENTANYL CITRATE 0.05 MG/ML
INJECTION, SOLUTION INTRAMUSCULAR; INTRAVENOUS PRN
Status: COMPLETED | OUTPATIENT
Start: 2024-01-10 | End: 2024-01-10

## 2024-01-10 RX ORDER — MIDAZOLAM HYDROCHLORIDE 1 MG/ML
INJECTION INTRAMUSCULAR; INTRAVENOUS PRN
Status: COMPLETED | OUTPATIENT
Start: 2024-01-10 | End: 2024-01-10

## 2024-01-10 RX ADMIN — MIDAZOLAM 1 MG: 1 INJECTION INTRAMUSCULAR; INTRAVENOUS at 08:49

## 2024-01-10 RX ADMIN — MIDAZOLAM 1 MG: 1 INJECTION INTRAMUSCULAR; INTRAVENOUS at 08:56

## 2024-01-10 RX ADMIN — FENTANYL CITRATE 50 MCG: 50 INJECTION INTRAMUSCULAR; INTRAVENOUS at 08:43

## 2024-01-10 RX ADMIN — SODIUM CHLORIDE, PRESERVATIVE FREE 10 ML: 5 INJECTION INTRAVENOUS at 07:52

## 2024-01-10 RX ADMIN — MIDAZOLAM 1 MG: 1 INJECTION INTRAMUSCULAR; INTRAVENOUS at 08:43

## 2024-01-10 RX ADMIN — FENTANYL CITRATE 50 MCG: 50 INJECTION INTRAMUSCULAR; INTRAVENOUS at 08:56

## 2024-01-10 RX ADMIN — FENTANYL CITRATE 50 MCG: 50 INJECTION INTRAMUSCULAR; INTRAVENOUS at 08:49

## 2024-01-10 ASSESSMENT — PAIN - FUNCTIONAL ASSESSMENT
PAIN_FUNCTIONAL_ASSESSMENT: NONE - DENIES PAIN

## 2024-01-10 NOTE — PROGRESS NOTES
Patient came down to special procedures for ct guided bone marrow biopsy and aspiration.  Patient was educated about the amount of radiation used with today's procedure.    Patient taken to Cat Scan.  Patient positioned prone, secured on Cat Scan table with O2 and monitoring devices attached.     Patient scanned and images reviewed by Dr. Joseph.    Patient prepped secured and draped.     Emotional support given.    0843 - sedation medication given.    0849 - Procedure start /78 65 12 98% on 2LPM/nasal cannula.    With the guidance of Cat Scan, needle inserted and two purple tubes and two green tubes filled and one - 12 gauge core biopsies taken by Dr. Joseph.     Patient re-scanned and images reviewed by .    Puncture site cleansed and dry sterile dressing of folded 4 x 4 and tegaderm applied to low mid back.     0900 - Procedure completed /61 62 15 94% on 2LPM/nasal cannula.    Biopsy sample taken to laboratory.     0915 - 15 minutes post procedure /64 70 16 94% on room air.  No complaints of pain at puncture site. Dressing CDI    See sedation documentation for vital signs.    Total amount of sedation medication given during procedure: 3mg of IV Versed and 150mcg of IV Fentanyl.    Nurse to nurse called, spoke with Renita RN, notified nurse of above information, nurse assumed post sedation vital signs.    Patient transported back to [facility].

## 2024-01-10 NOTE — DISCHARGE INSTRUCTIONS
Bone Marrow Aspiration and Biopsy: What to Expect at Home  Your Recovery  The biopsy site may feel sore for several days. You may have a bruise on the site. It may help to take pain medicine and put ice packs on the site. You will probably be able to return to work and your usual activities the day after the procedure. Your doctor or nurse will call you with the results of your test.  This care sheet gives you a general idea about how long it will take for you to recover. But each person recovers at a different pace. Follow the steps below to get better as quickly as possible.  How can you care for yourself at home?  Activity    Rest when you feel tired. Getting enough sleep will help you recover.     Most people are able to return to their usual activities the day after the procedure.   Medicines    Your doctor will tell you if and when you can restart your medicines. You will be given instructions about taking any new medicines.     If you stopped taking aspirin or some other blood thinner, your doctor will tell you when to start taking it again.     Be safe with medicines. Read and follow all instructions on the label.  If you are not taking a prescription pain medicine, ask your doctor if you can take an over-the-counter medicine such as acetaminophen (Tylenol). Read and follow all instructions on the label.  If the doctor gave you a prescription medicine for pain, take it as prescribed.  Store your prescription pain medicines where no one else can get to them. When you are done using them, dispose of them quickly and safely. Your local pharmacy or hospital may have a drop-off site.  Do not take two or more pain medicines at the same time unless the doctor told you to. Many pain medicines have acetaminophen, which is Tylenol. Too much acetaminophen (Tylenol) can be harmful.   Ice    Put ice or a cold pack on the biopsy site for 10 to 20 minutes at a time. Put a thin cloth between the ice and your skin.

## 2024-01-22 LAB — BONE MARROW REPORT: NORMAL

## 2024-01-26 ENCOUNTER — HOSPITAL ENCOUNTER (OUTPATIENT)
Dept: INFUSION THERAPY | Age: 69
Discharge: HOME | End: 2024-01-26
Payer: COMMERCIAL

## 2024-01-26 DIAGNOSIS — D47.2 MONOCLONAL GAMMOPATHY: Primary | ICD-10-CM

## 2024-01-26 LAB
BASOPHILS # BLD: 0.03 K/UL (ref 0–0.2)
BASOPHILS NFR BLD: 1 % (ref 0–2)
EOSINOPHIL # BLD: 0.18 K/UL (ref 0.05–0.5)
EOSINOPHILS RELATIVE PERCENT: 4 % (ref 0–6)
ERYTHROCYTE [DISTWIDTH] IN BLOOD BY AUTOMATED COUNT: 11.9 % (ref 11.5–15)
FERRITIN SERPL-MCNC: 78 NG/ML
HCT VFR BLD AUTO: 44.1 % (ref 34–48)
HGB BLD-MCNC: 14.6 G/DL (ref 11.5–15.5)
IGA SERPL-MCNC: 452 MG/DL (ref 70–400)
IGG SERPL-MCNC: 571 MG/DL (ref 700–1600)
IGM SERPL-MCNC: 113 MG/DL (ref 40–230)
IMM GRANULOCYTES # BLD AUTO: <0.03 K/UL (ref 0–0.58)
IMM GRANULOCYTES NFR BLD: 0 % (ref 0–5)
IRON SATN MFR SERPL: 30 % (ref 15–50)
IRON SERPL-MCNC: 89 UG/DL (ref 37–145)
LYMPHOCYTES NFR BLD: 1.18 K/UL (ref 1.5–4)
LYMPHOCYTES RELATIVE PERCENT: 26 % (ref 20–42)
MCH RBC QN AUTO: 28.7 PG (ref 26–35)
MCHC RBC AUTO-ENTMCNC: 33.1 G/DL (ref 32–34.5)
MCV RBC AUTO: 86.8 FL (ref 80–99.9)
MONOCYTES NFR BLD: 0.32 K/UL (ref 0.1–0.95)
MONOCYTES NFR BLD: 7 % (ref 2–12)
NEUTROPHILS NFR BLD: 63 % (ref 43–80)
NEUTS SEG NFR BLD: 2.87 K/UL (ref 1.8–7.3)
PLATELET # BLD AUTO: 211 K/UL (ref 130–450)
PMV BLD AUTO: 9.3 FL (ref 7–12)
RBC # BLD AUTO: 5.08 M/UL (ref 3.5–5.5)
TIBC SERPL-MCNC: 301 UG/DL (ref 250–450)
WBC OTHER # BLD: 4.6 K/UL (ref 4.5–11.5)

## 2024-01-26 PROCEDURE — 82728 ASSAY OF FERRITIN: CPT

## 2024-01-26 PROCEDURE — 82232 ASSAY OF BETA-2 PROTEIN: CPT

## 2024-01-26 PROCEDURE — 36415 COLL VENOUS BLD VENIPUNCTURE: CPT

## 2024-01-26 PROCEDURE — 82784 ASSAY IGA/IGD/IGG/IGM EACH: CPT

## 2024-01-26 PROCEDURE — 84165 PROTEIN E-PHORESIS SERUM: CPT

## 2024-01-26 PROCEDURE — 83550 IRON BINDING TEST: CPT

## 2024-01-26 PROCEDURE — 83540 ASSAY OF IRON: CPT

## 2024-01-26 PROCEDURE — 84155 ASSAY OF PROTEIN SERUM: CPT

## 2024-01-26 PROCEDURE — 86334 IMMUNOFIX E-PHORESIS SERUM: CPT

## 2024-01-26 PROCEDURE — 83521 IG LIGHT CHAINS FREE EACH: CPT

## 2024-01-26 PROCEDURE — 85025 COMPLETE CBC W/AUTO DIFF WBC: CPT

## 2024-01-27 LAB
B2 MICROGLOB SERPL IA-MCNC: 2.2 MG/L
FREE KAPPA/LAMBDA RATIO: 1.01 (ref 0.26–1.65)
KAPPA LC FREE SER-MCNC: 9.9 MG/L (ref 3.7–19.4)
LAMBDA LC FREE SERPL-MCNC: 9.8 MG/L (ref 5.7–26.3)

## 2024-01-29 ENCOUNTER — OFFICE VISIT (OUTPATIENT)
Dept: ONCOLOGY | Age: 69
End: 2024-01-29
Payer: COMMERCIAL

## 2024-01-29 VITALS
WEIGHT: 266.7 LBS | SYSTOLIC BLOOD PRESSURE: 141 MMHG | OXYGEN SATURATION: 97 % | TEMPERATURE: 96.9 F | HEIGHT: 66 IN | HEART RATE: 74 BPM | DIASTOLIC BLOOD PRESSURE: 89 MMHG | BODY MASS INDEX: 42.86 KG/M2

## 2024-01-29 DIAGNOSIS — R71.8 MICROCYTOSIS: ICD-10-CM

## 2024-01-29 DIAGNOSIS — I26.99 OTHER ACUTE PULMONARY EMBOLISM, UNSPECIFIED WHETHER ACUTE COR PULMONALE PRESENT (HCC): ICD-10-CM

## 2024-01-29 DIAGNOSIS — D47.2 MGUS (MONOCLONAL GAMMOPATHY OF UNKNOWN SIGNIFICANCE): ICD-10-CM

## 2024-01-29 DIAGNOSIS — E83.52 HYPERCALCEMIA: ICD-10-CM

## 2024-01-29 DIAGNOSIS — D47.2 MONOCLONAL GAMMOPATHY: Primary | ICD-10-CM

## 2024-01-29 LAB
ALBUMIN SERPL-MCNC: 3.4 G/DL (ref 3.5–4.7)
ALPHA1 GLOB SERPL ELPH-MCNC: 0.2 G/DL (ref 0.2–0.4)
ALPHA2 GLOB SERPL ELPH-MCNC: 0.8 G/DL (ref 0.5–1)
B-GLOBULIN SERPL ELPH-MCNC: 1.1 G/DL (ref 0.8–1.3)
GAMMA GLOB SERPL ELPH-MCNC: 0.7 G/DL (ref 0.7–1.6)
INTERPRETATION SERPL IFE-IMP: NORMAL
M PROTEIN SERPL ELPH-MCNC: 0.2 G/DL
PATH REV: NORMAL
PATHOLOGIST: ABNORMAL
PROT PATTERN SERPL ELPH-IMP: ABNORMAL
PROT SERPL-MCNC: 6.3 G/DL (ref 6.4–8.3)

## 2024-01-29 PROCEDURE — 1123F ACP DISCUSS/DSCN MKR DOCD: CPT | Performed by: INTERNAL MEDICINE

## 2024-01-29 PROCEDURE — 99212 OFFICE O/P EST SF 10 MIN: CPT

## 2024-01-29 PROCEDURE — 99214 OFFICE O/P EST MOD 30 MIN: CPT | Performed by: INTERNAL MEDICINE

## 2024-01-29 NOTE — PROGRESS NOTES
[facility] MEDICAL ONCOLOGY  [address]  [place] [address]  Dept: [phone]  Loc: [phone]  Attending progress note      Reason for Visit:   Leukopenia, history of PE, MGUS.    Referring Physician: Joan Vergara CNP    PCP:  Sanchez Nice DO    History of Present Illness:    The patient is a very pleasant 68-year-old lady,  with a PMH significant for hyperlipedemia, obesity, OA, I met the patient initially in 2016 after diagnosis with PE,she underwent on 1/29/2016 a left total hip arthroplasty, the patient was on Xarelto for about 2 weeks following her surgery. On 3/8/2016 the patient started having chest pain, shortness of breath, palpitations and lightheadedness, she was seen at the ED on 3/9/2916, had a CTA chest done revealing Extensive filling defects involve the distal central right and left pulmonary arteries, in addition to the bilateral upper and lower lobe and right middle lobe pulmonary arteries. Findings are compatible with pulmonary emboli. She had b/l LE venous ultrasounds done, were negative for DVTs.  The patient was on Xarelto for 1 year, it was discontinued on 3/22/2017.  No recurrent DVTs.  She continues to be on baby aspirin.    The patient has a history of intermittent leukopenia, her CBCD from 2/4/2021 was remarkable for a white count of 4.3, ANC 2130, hemoglobin 11.6, hematocrit 37.1, MCV 78.1, platelets 274K.     The patient was donating blood about once every 8 weeks.    The patient presents for a follow-up visit, she had a bone marrow biopsy and aspirate done on 1/10/2024.  No issues from the procedure.  She is feeling well in general.    Review of Systems;  CONSTITUTIONAL: No fever, chills. Good appetite, positive for weight loss.  ENMT: Eyes: No diplopia; Nose: No epistaxis. Mouth: No sore throat.  RESPIRATORY: No hemoptysis, positive for shortness of breath from asthma.  CARDIOVASCULAR: No chest pain,

## 2024-04-22 ENCOUNTER — HOSPITAL ENCOUNTER (OUTPATIENT)
Dept: INFUSION THERAPY | Age: 69
Discharge: HOME | End: 2024-04-22
Payer: COMMERCIAL

## 2024-04-22 DIAGNOSIS — D47.2 MGUS (MONOCLONAL GAMMOPATHY OF UNKNOWN SIGNIFICANCE): ICD-10-CM

## 2024-04-22 DIAGNOSIS — D47.2 MONOCLONAL GAMMOPATHY: ICD-10-CM

## 2024-04-22 LAB
ANION GAP SERPL CALCULATED.3IONS-SCNC: 14 MMOL/L (ref 7–16)
BASOPHILS # BLD: 0.03 K/UL (ref 0–0.2)
BASOPHILS NFR BLD: 1 % (ref 0–2)
BUN SERPL-MCNC: 26 MG/DL (ref 6–23)
CALCIUM SERPL-MCNC: 9.6 MG/DL (ref 8.6–10.2)
CHLORIDE SERPL-SCNC: 103 MMOL/L (ref 98–107)
CO2 SERPL-SCNC: 24 MMOL/L (ref 22–29)
CREAT SERPL-MCNC: 0.9 MG/DL (ref 0.5–1)
EOSINOPHIL # BLD: 0.24 K/UL (ref 0.05–0.5)
EOSINOPHILS RELATIVE PERCENT: 5 % (ref 0–6)
ERYTHROCYTE [DISTWIDTH] IN BLOOD BY AUTOMATED COUNT: 12 % (ref 11.5–15)
GFR SERPL CREATININE-BSD FRML MDRD: 71 ML/MIN/1.73M2
GLUCOSE SERPL-MCNC: 129 MG/DL (ref 74–99)
HCT VFR BLD AUTO: 42.1 % (ref 34–48)
HGB BLD-MCNC: 14.1 G/DL (ref 11.5–15.5)
IGA SERPL-MCNC: 439 MG/DL (ref 70–400)
IGG SERPL-MCNC: 577 MG/DL (ref 700–1600)
IGM SERPL-MCNC: 118 MG/DL (ref 40–230)
IMM GRANULOCYTES # BLD AUTO: <0.03 K/UL (ref 0–0.58)
IMM GRANULOCYTES NFR BLD: 0 % (ref 0–5)
LYMPHOCYTES NFR BLD: 1.31 K/UL (ref 1.5–4)
LYMPHOCYTES RELATIVE PERCENT: 27 % (ref 20–42)
MCH RBC QN AUTO: 28.8 PG (ref 26–35)
MCHC RBC AUTO-ENTMCNC: 33.5 G/DL (ref 32–34.5)
MCV RBC AUTO: 85.9 FL (ref 80–99.9)
MONOCYTES NFR BLD: 0.4 K/UL (ref 0.1–0.95)
MONOCYTES NFR BLD: 8 % (ref 2–12)
NEUTROPHILS NFR BLD: 60 % (ref 43–80)
NEUTS SEG NFR BLD: 2.95 K/UL (ref 1.8–7.3)
PLATELET # BLD AUTO: 213 K/UL (ref 130–450)
PMV BLD AUTO: 9.2 FL (ref 7–12)
POTASSIUM SERPL-SCNC: 3.8 MMOL/L (ref 3.5–5)
RBC # BLD AUTO: 4.9 M/UL (ref 3.5–5.5)
SODIUM SERPL-SCNC: 141 MMOL/L (ref 132–146)
WBC OTHER # BLD: 4.9 K/UL (ref 4.5–11.5)

## 2024-04-22 PROCEDURE — 82784 ASSAY IGA/IGD/IGG/IGM EACH: CPT

## 2024-04-22 PROCEDURE — 36415 COLL VENOUS BLD VENIPUNCTURE: CPT

## 2024-04-22 PROCEDURE — 84165 PROTEIN E-PHORESIS SERUM: CPT

## 2024-04-22 PROCEDURE — 85025 COMPLETE CBC W/AUTO DIFF WBC: CPT

## 2024-04-22 PROCEDURE — 84155 ASSAY OF PROTEIN SERUM: CPT

## 2024-04-22 PROCEDURE — 83521 IG LIGHT CHAINS FREE EACH: CPT

## 2024-04-22 PROCEDURE — 86334 IMMUNOFIX E-PHORESIS SERUM: CPT

## 2024-04-22 PROCEDURE — 80048 BASIC METABOLIC PNL TOTAL CA: CPT

## 2024-04-24 LAB
ALBUMIN SERPL-MCNC: 3.8 G/DL (ref 3.5–4.7)
ALPHA1 GLOB SERPL ELPH-MCNC: 0.2 G/DL (ref 0.2–0.4)
ALPHA2 GLOB SERPL ELPH-MCNC: 0.8 G/DL (ref 0.5–1)
B-GLOBULIN SERPL ELPH-MCNC: 1.1 G/DL (ref 0.8–1.3)
FREE KAPPA/LAMBDA RATIO: 0.92 (ref 0.22–1.74)
GAMMA GLOB SERPL ELPH-MCNC: 0.7 G/DL (ref 0.7–1.6)
INTERPRETATION SERPL IFE-IMP: NORMAL
KAPPA LC FREE SER-MCNC: 16.2 MG/L
LAMBDA LC FREE SERPL-MCNC: 17.7 MG/L (ref 4.2–27.7)
M PROTEIN SERPL ELPH-MCNC: 0.5 G/DL
PATH REV: NORMAL
PATHOLOGIST: NORMAL
PROT PATTERN SERPL ELPH-IMP: NORMAL
PROT SERPL-MCNC: 6.5 G/DL (ref 6.4–8.3)

## 2024-04-29 ENCOUNTER — OFFICE VISIT (OUTPATIENT)
Dept: ONCOLOGY | Age: 69
End: 2024-04-29
Payer: COMMERCIAL

## 2024-04-29 VITALS
RESPIRATION RATE: 18 BRPM | HEART RATE: 80 BPM | OXYGEN SATURATION: 96 % | DIASTOLIC BLOOD PRESSURE: 77 MMHG | TEMPERATURE: 97 F | SYSTOLIC BLOOD PRESSURE: 110 MMHG | WEIGHT: 271.1 LBS | BODY MASS INDEX: 43.76 KG/M2

## 2024-04-29 DIAGNOSIS — E83.52 HYPERCALCEMIA: ICD-10-CM

## 2024-04-29 DIAGNOSIS — D47.2 MONOCLONAL GAMMOPATHY: Primary | ICD-10-CM

## 2024-04-29 DIAGNOSIS — D47.2 MGUS (MONOCLONAL GAMMOPATHY OF UNKNOWN SIGNIFICANCE): ICD-10-CM

## 2024-04-29 PROCEDURE — 99212 OFFICE O/P EST SF 10 MIN: CPT

## 2024-04-29 PROCEDURE — 99214 OFFICE O/P EST MOD 30 MIN: CPT | Performed by: INTERNAL MEDICINE

## 2024-04-29 PROCEDURE — 1123F ACP DISCUSS/DSCN MKR DOCD: CPT | Performed by: INTERNAL MEDICINE

## 2024-04-29 NOTE — PROGRESS NOTES
Problem Relation Age of Onset    Cancer Father 41        bladder    No Known Problems Mother     No Known Problems Sister     No Known Problems Brother     No Known Problems Maternal Aunt     No Known Problems Maternal Uncle     Cancer Paternal Aunt         lung    No Known Problems Paternal Uncle     No Known Problems Maternal Grandmother     No Known Problems Maternal Grandfather     No Known Problems Paternal Grandmother     No Known Problems Paternal Grandfather     Mult Sclerosis Paternal Cousin        Medications:  Reviewed and reconciled.    Social History:  Social History     Socioeconomic History    Marital status: Single     Spouse name: Not on file    Number of children: Not on file    Years of education: Not on file    Highest education level: Not on file   Occupational History    Not on file   Tobacco Use    Smoking status: Never    Smokeless tobacco: Never   Vaping Use    Vaping Use: Never used   Substance and Sexual Activity    Alcohol use: No     Comment: 1 to 2 times per year.  Hot tea andice tea 1 cup a day.    Drug use: No    Sexual activity: Never   Other Topics Concern    Not on file   Social History Narrative    Not on file     Social Determinants of Health     Financial Resource Strain: Not on file   Food Insecurity: Not on file   Transportation Needs: Not on file   Physical Activity: Not on file   Stress: Not on file   Social Connections: Not on file   Intimate Partner Violence: Not on file   Housing Stability: Not on file       Allergies:  No Known Allergies    Physical Exam:  /77   Pulse 80   Temp 97 °F (36.1 °C)   Resp 18   Wt 123 kg (271 lb 1.6 oz)   SpO2 96%   BMI 43.76 kg/m²   GENERAL: Alert, oriented x 3, not in acute distress.  HEENT: PERRLA; EOMI. Oropharynx clear.   NECK: Supple. No palpable cervical or supraclavicular lymphadenopathy.   LUNGS: Good air entry bilaterally. No wheezing, crackles or rhonchi.   CARDIOVASCULAR: Regular rate. No murmurs, rubs or gallops.

## 2024-06-28 ENCOUNTER — HOSPITAL ENCOUNTER (OUTPATIENT)
Age: 69
Discharge: HOME | End: 2024-06-28
Payer: COMMERCIAL

## 2024-06-28 LAB
ALBUMIN SERPL-MCNC: 4.1 G/DL (ref 3.5–5.2)
ALP SERPL-CCNC: 70 U/L (ref 35–104)
ALT SERPL-CCNC: 13 U/L (ref 0–32)
ANION GAP SERPL CALCULATED.3IONS-SCNC: 9 MMOL/L (ref 7–16)
AST SERPL-CCNC: 15 U/L (ref 0–31)
BASOPHILS # BLD: 0.03 K/UL (ref 0–0.2)
BASOPHILS NFR BLD: 1 % (ref 0–2)
BILIRUB SERPL-MCNC: 0.4 MG/DL (ref 0–1.2)
BUN SERPL-MCNC: 29 MG/DL (ref 6–23)
CALCIUM SERPL-MCNC: 10.2 MG/DL (ref 8.6–10.2)
CHLORIDE SERPL-SCNC: 105 MMOL/L (ref 98–107)
CHOLEST SERPL-MCNC: 159 MG/DL
CO2 SERPL-SCNC: 27 MMOL/L (ref 22–29)
CREAT SERPL-MCNC: 0.9 MG/DL (ref 0.5–1)
EOSINOPHIL # BLD: 0.23 K/UL (ref 0.05–0.5)
EOSINOPHILS RELATIVE PERCENT: 5 % (ref 0–6)
ERYTHROCYTE [DISTWIDTH] IN BLOOD BY AUTOMATED COUNT: 12 % (ref 11.5–15)
GFR, ESTIMATED: 67 ML/MIN/1.73M2
GLUCOSE SERPL-MCNC: 118 MG/DL (ref 74–99)
HBA1C MFR BLD: 5.8 % (ref 4–5.6)
HCT VFR BLD AUTO: 42.5 % (ref 34–48)
HDLC SERPL-MCNC: 58 MG/DL
HGB BLD-MCNC: 14.1 G/DL (ref 11.5–15.5)
IMM GRANULOCYTES # BLD AUTO: <0.03 K/UL (ref 0–0.58)
IMM GRANULOCYTES NFR BLD: 0 % (ref 0–5)
LDLC SERPL CALC-MCNC: 81 MG/DL
LYMPHOCYTES NFR BLD: 1.23 K/UL (ref 1.5–4)
LYMPHOCYTES RELATIVE PERCENT: 26 % (ref 20–42)
MCH RBC QN AUTO: 28.8 PG (ref 26–35)
MCHC RBC AUTO-ENTMCNC: 33.2 G/DL (ref 32–34.5)
MCV RBC AUTO: 86.7 FL (ref 80–99.9)
MONOCYTES NFR BLD: 0.44 K/UL (ref 0.1–0.95)
MONOCYTES NFR BLD: 9 % (ref 2–12)
NEUTROPHILS NFR BLD: 60 % (ref 43–80)
NEUTS SEG NFR BLD: 2.86 K/UL (ref 1.8–7.3)
PLATELET # BLD AUTO: 193 K/UL (ref 130–450)
PMV BLD AUTO: 9.4 FL (ref 7–12)
POTASSIUM SERPL-SCNC: 4.7 MMOL/L (ref 3.5–5)
PROT SERPL-MCNC: 6.9 G/DL (ref 6.4–8.3)
RBC # BLD AUTO: 4.9 M/UL (ref 3.5–5.5)
SODIUM SERPL-SCNC: 141 MMOL/L (ref 132–146)
TRIGL SERPL-MCNC: 100 MG/DL
TSH SERPL DL<=0.05 MIU/L-ACNC: 1.62 UIU/ML (ref 0.27–4.2)
VLDLC SERPL CALC-MCNC: 20 MG/DL
WBC OTHER # BLD: 4.8 K/UL (ref 4.5–11.5)

## 2024-06-28 PROCEDURE — 36415 COLL VENOUS BLD VENIPUNCTURE: CPT

## 2024-06-28 PROCEDURE — 80061 LIPID PANEL: CPT

## 2024-06-28 PROCEDURE — 84443 ASSAY THYROID STIM HORMONE: CPT

## 2024-06-28 PROCEDURE — 83036 HEMOGLOBIN GLYCOSYLATED A1C: CPT

## 2024-06-28 PROCEDURE — 85025 COMPLETE CBC W/AUTO DIFF WBC: CPT

## 2024-06-28 PROCEDURE — 80053 COMPREHEN METABOLIC PANEL: CPT

## 2024-07-22 ENCOUNTER — HOSPITAL ENCOUNTER (OUTPATIENT)
Dept: INFUSION THERAPY | Age: 69
Discharge: HOME | End: 2024-07-22
Payer: COMMERCIAL

## 2024-07-22 DIAGNOSIS — D72.819 LEUKOPENIA, UNSPECIFIED TYPE: Primary | ICD-10-CM

## 2024-07-22 LAB
ALBUMIN SERPL-MCNC: 3.9 G/DL (ref 3.5–5.2)
ALP SERPL-CCNC: 58 U/L (ref 35–104)
ALT SERPL-CCNC: 16 U/L (ref 0–32)
ANION GAP SERPL CALCULATED.3IONS-SCNC: 10 MMOL/L (ref 7–16)
AST SERPL-CCNC: 19 U/L (ref 0–31)
BASOPHILS # BLD: 0.03 K/UL (ref 0–0.2)
BASOPHILS NFR BLD: 1 % (ref 0–2)
BILIRUB SERPL-MCNC: 0.3 MG/DL (ref 0–1.2)
BUN SERPL-MCNC: 23 MG/DL (ref 6–23)
CALCIUM SERPL-MCNC: 10 MG/DL (ref 8.6–10.2)
CHLORIDE SERPL-SCNC: 104 MMOL/L (ref 98–107)
CO2 SERPL-SCNC: 26 MMOL/L (ref 22–29)
CREAT SERPL-MCNC: 0.8 MG/DL (ref 0.5–1)
EOSINOPHIL # BLD: 0.21 K/UL (ref 0.05–0.5)
EOSINOPHILS RELATIVE PERCENT: 4 % (ref 0–6)
ERYTHROCYTE [DISTWIDTH] IN BLOOD BY AUTOMATED COUNT: 11.9 % (ref 11.5–15)
FERRITIN SERPL-MCNC: 59 NG/ML
GFR, ESTIMATED: 77 ML/MIN/1.73M2
GLUCOSE SERPL-MCNC: 112 MG/DL (ref 74–99)
HCT VFR BLD AUTO: 42 % (ref 34–48)
HGB BLD-MCNC: 13.9 G/DL (ref 11.5–15.5)
IGA SERPL-MCNC: 468 MG/DL (ref 70–400)
IGG SERPL-MCNC: 591 MG/DL (ref 700–1600)
IGM SERPL-MCNC: 117 MG/DL (ref 40–230)
IMM GRANULOCYTES # BLD AUTO: <0.03 K/UL (ref 0–0.58)
IMM GRANULOCYTES NFR BLD: 0 % (ref 0–5)
IRON SATN MFR SERPL: 24 % (ref 15–50)
IRON SERPL-MCNC: 74 UG/DL (ref 37–145)
LYMPHOCYTES NFR BLD: 1.3 K/UL (ref 1.5–4)
LYMPHOCYTES RELATIVE PERCENT: 26 % (ref 20–42)
MCH RBC QN AUTO: 29 PG (ref 26–35)
MCHC RBC AUTO-ENTMCNC: 33.1 G/DL (ref 32–34.5)
MCV RBC AUTO: 87.5 FL (ref 80–99.9)
MONOCYTES NFR BLD: 0.46 K/UL (ref 0.1–0.95)
MONOCYTES NFR BLD: 9 % (ref 2–12)
NEUTROPHILS NFR BLD: 60 % (ref 43–80)
NEUTS SEG NFR BLD: 2.99 K/UL (ref 1.8–7.3)
PLATELET # BLD AUTO: 204 K/UL (ref 130–450)
PMV BLD AUTO: 9.4 FL (ref 7–12)
POTASSIUM SERPL-SCNC: 4.4 MMOL/L (ref 3.5–5)
PROT SERPL-MCNC: 6.5 G/DL (ref 6.4–8.3)
RBC # BLD AUTO: 4.8 M/UL (ref 3.5–5.5)
SODIUM SERPL-SCNC: 140 MMOL/L (ref 132–146)
TIBC SERPL-MCNC: 305 UG/DL (ref 250–450)
WBC OTHER # BLD: 5 K/UL (ref 4.5–11.5)

## 2024-07-22 PROCEDURE — 84165 PROTEIN E-PHORESIS SERUM: CPT

## 2024-07-22 PROCEDURE — 82784 ASSAY IGA/IGD/IGG/IGM EACH: CPT

## 2024-07-22 PROCEDURE — 36415 COLL VENOUS BLD VENIPUNCTURE: CPT

## 2024-07-22 PROCEDURE — 85025 COMPLETE CBC W/AUTO DIFF WBC: CPT

## 2024-07-22 PROCEDURE — 80053 COMPREHEN METABOLIC PANEL: CPT

## 2024-07-22 PROCEDURE — 82232 ASSAY OF BETA-2 PROTEIN: CPT

## 2024-07-22 PROCEDURE — 83521 IG LIGHT CHAINS FREE EACH: CPT

## 2024-07-22 PROCEDURE — 82728 ASSAY OF FERRITIN: CPT

## 2024-07-22 PROCEDURE — 84155 ASSAY OF PROTEIN SERUM: CPT

## 2024-07-22 PROCEDURE — 86334 IMMUNOFIX E-PHORESIS SERUM: CPT

## 2024-07-22 PROCEDURE — 83550 IRON BINDING TEST: CPT

## 2024-07-22 PROCEDURE — 83540 ASSAY OF IRON: CPT

## 2024-07-23 LAB
B2 MICROGLOB SERPL IA-MCNC: 1.9 MG/L
FREE KAPPA/LAMBDA RATIO: 0.9 (ref 0.22–1.74)
KAPPA LC FREE SER-MCNC: 16.6 MG/L
LAMBDA LC FREE SERPL-MCNC: 18.5 MG/L (ref 4.2–27.7)

## 2024-07-24 LAB
ALBUMIN SERPL-MCNC: 3.4 G/DL (ref 3.5–4.7)
ALPHA1 GLOB SERPL ELPH-MCNC: 0.2 G/DL (ref 0.2–0.4)
ALPHA2 GLOB SERPL ELPH-MCNC: 0.9 G/DL (ref 0.5–1)
B-GLOBULIN SERPL ELPH-MCNC: 1.2 G/DL (ref 0.8–1.3)
GAMMA GLOB SERPL ELPH-MCNC: 0.8 G/DL (ref 0.7–1.6)
INTERPRETATION SERPL IFE-IMP: NORMAL
M PROTEIN SERPL ELPH-MCNC: 0.6 G/DL
PATH REV: NORMAL
PATHOLOGIST: ABNORMAL
PROT PATTERN SERPL ELPH-IMP: ABNORMAL
PROT SERPL-MCNC: 6.5 G/DL (ref 6.4–8.3)

## 2024-07-29 ENCOUNTER — OFFICE VISIT (OUTPATIENT)
Dept: ONCOLOGY | Age: 69
End: 2024-07-29
Payer: COMMERCIAL

## 2024-07-29 VITALS
DIASTOLIC BLOOD PRESSURE: 89 MMHG | HEIGHT: 66 IN | TEMPERATURE: 97.9 F | HEART RATE: 77 BPM | BODY MASS INDEX: 44.95 KG/M2 | OXYGEN SATURATION: 98 % | SYSTOLIC BLOOD PRESSURE: 152 MMHG | WEIGHT: 279.7 LBS

## 2024-07-29 DIAGNOSIS — D47.2 MONOCLONAL GAMMOPATHY: Primary | ICD-10-CM

## 2024-07-29 DIAGNOSIS — I26.99 OTHER ACUTE PULMONARY EMBOLISM, UNSPECIFIED WHETHER ACUTE COR PULMONALE PRESENT (HCC): ICD-10-CM

## 2024-07-29 DIAGNOSIS — E83.52 HYPERCALCEMIA: ICD-10-CM

## 2024-07-29 DIAGNOSIS — R71.8 MICROCYTOSIS: ICD-10-CM

## 2024-07-29 PROCEDURE — 1123F ACP DISCUSS/DSCN MKR DOCD: CPT | Performed by: INTERNAL MEDICINE

## 2024-07-29 PROCEDURE — 99214 OFFICE O/P EST MOD 30 MIN: CPT | Performed by: INTERNAL MEDICINE

## 2024-07-29 PROCEDURE — 99212 OFFICE O/P EST SF 10 MIN: CPT

## 2024-07-29 NOTE — PROGRESS NOTES
she is up-to-date with colonoscopies.  Most likely this is secondary to blood donation, the patient was started on oral iron, her hemoglobin/hematocrit had normalized, iron panel is normal, taking the oral iron about twice weekly, which she will continue, monitor counts and iron panel, she has not been donating blood recently.    Mild hypercalcemia, could be drug-induced, recommended a work-up to evaluate for hyperparathyroidism, rule out multiple myeloma, labs reviewed, PTH is normal 25.9, SPEP with immunofixation had revealed IgA lambda monoclonal protein, M spike 0.6G/DL, IgG 590, IgA 420, IgM 111, kappa 11.3, lambda 18.7, M ratio 0.6, most likely the patient has plasma cell dyscrasia, likely MGUS, she does not have anemia at this time, or kidney disease, calcium had normalized, discussed diagnosis with the patient, recommended skeletal survey, was done on 11/24/2023, which I had reviewed, it had revealed degenerative changes, no lytic lesions.  Recommended a bone marrow biopsy and aspirate, which was done on 1/10/2024, the results were reviewed with the patient, she has plasma cell neoplasm, 3 to 4% with an IgH translocation on FISH, the results were reviewed with the patient, the biopsies confirm the diagnosis of MGUS, discussed the risk of multiple myeloma.    Labs reviewed, SPEP with immunofixation had revealed IgA lambda monoclonal protein, M spike is small and stable, 0.7 IgG 591, IgA 468, IgM 117, normal serum light chain assay, no iron deficiency, normal hemoglobin and hematocrit.  No hypercalcemia at this time or kidney disease, there is no evidence of progression to multiple myeloma, recommended follow-up.    RTC in 3-months.    Thank you for allowing us to participate in the care of Mrs. Wallace.    OLLIE CURTIS MD   HEMATOLOGY/MEDICAL ONCOLOGY  [facility] MEDICAL ONCOLOGY  [address] [place] [id number]  Dept: [phone]  Loc:

## 2024-10-04 ENCOUNTER — HOSPITAL ENCOUNTER (OUTPATIENT)
Age: 69
Discharge: HOME | End: 2024-10-04
Payer: COMMERCIAL

## 2024-10-04 LAB
ALBUMIN SERPL-MCNC: 4 G/DL (ref 3.5–5.2)
ALP SERPL-CCNC: 48 U/L (ref 35–104)
ALT SERPL-CCNC: 12 U/L (ref 0–32)
ANION GAP SERPL CALCULATED.3IONS-SCNC: 9 MMOL/L (ref 7–16)
AST SERPL-CCNC: 14 U/L (ref 0–31)
BASOPHILS # BLD: 0.05 K/UL (ref 0–0.2)
BASOPHILS NFR BLD: 1 % (ref 0–2)
BILIRUB SERPL-MCNC: 0.4 MG/DL (ref 0–1.2)
BUN SERPL-MCNC: 18 MG/DL (ref 6–23)
CALCIUM SERPL-MCNC: 10.3 MG/DL (ref 8.6–10.2)
CHLORIDE SERPL-SCNC: 102 MMOL/L (ref 98–107)
CHOLEST SERPL-MCNC: 131 MG/DL
CO2 SERPL-SCNC: 27 MMOL/L (ref 22–29)
CREAT SERPL-MCNC: 1 MG/DL (ref 0.5–1)
EOSINOPHIL # BLD: 0.27 K/UL (ref 0.05–0.5)
EOSINOPHILS RELATIVE PERCENT: 6 % (ref 0–6)
ERYTHROCYTE [DISTWIDTH] IN BLOOD BY AUTOMATED COUNT: 11.9 % (ref 11.5–15)
GFR, ESTIMATED: 63 ML/MIN/1.73M2
GLUCOSE P FAST SERPL-MCNC: 103 MG/DL (ref 74–99)
HBA1C MFR BLD: 5.8 % (ref 4–5.6)
HCT VFR BLD AUTO: 42.9 % (ref 34–48)
HDLC SERPL-MCNC: 48 MG/DL
HGB BLD-MCNC: 14.5 G/DL (ref 11.5–15.5)
IMM GRANULOCYTES # BLD AUTO: <0.03 K/UL (ref 0–0.58)
IMM GRANULOCYTES NFR BLD: 0 % (ref 0–5)
LDLC SERPL CALC-MCNC: 56 MG/DL
LYMPHOCYTES NFR BLD: 1.13 K/UL (ref 1.5–4)
LYMPHOCYTES RELATIVE PERCENT: 24 % (ref 20–42)
MCH RBC QN AUTO: 29 PG (ref 26–35)
MCHC RBC AUTO-ENTMCNC: 33.8 G/DL (ref 32–34.5)
MCV RBC AUTO: 85.8 FL (ref 80–99.9)
MONOCYTES NFR BLD: 0.44 K/UL (ref 0.1–0.95)
MONOCYTES NFR BLD: 9 % (ref 2–12)
NEUTROPHILS NFR BLD: 60 % (ref 43–80)
NEUTS SEG NFR BLD: 2.9 K/UL (ref 1.8–7.3)
PLATELET # BLD AUTO: 206 K/UL (ref 130–450)
PMV BLD AUTO: 9.8 FL (ref 7–12)
POTASSIUM SERPL-SCNC: 4.3 MMOL/L (ref 3.5–5)
PROT SERPL-MCNC: 6.7 G/DL (ref 6.4–8.3)
RBC # BLD AUTO: 5 M/UL (ref 3.5–5.5)
SODIUM SERPL-SCNC: 138 MMOL/L (ref 132–146)
TRIGL SERPL-MCNC: 134 MG/DL
TSH SERPL DL<=0.05 MIU/L-ACNC: 1.25 UIU/ML (ref 0.27–4.2)
VLDLC SERPL CALC-MCNC: 27 MG/DL
WBC OTHER # BLD: 4.8 K/UL (ref 4.5–11.5)

## 2024-10-04 PROCEDURE — 84443 ASSAY THYROID STIM HORMONE: CPT

## 2024-10-04 PROCEDURE — 83036 HEMOGLOBIN GLYCOSYLATED A1C: CPT

## 2024-10-04 PROCEDURE — 85025 COMPLETE CBC W/AUTO DIFF WBC: CPT

## 2024-10-04 PROCEDURE — 80061 LIPID PANEL: CPT

## 2024-10-04 PROCEDURE — 80053 COMPREHEN METABOLIC PANEL: CPT

## 2024-10-04 PROCEDURE — 36415 COLL VENOUS BLD VENIPUNCTURE: CPT

## 2024-10-24 ENCOUNTER — HOSPITAL ENCOUNTER (OUTPATIENT)
Dept: INFUSION THERAPY | Age: 69
Discharge: HOME | End: 2024-10-24
Payer: COMMERCIAL

## 2024-10-24 DIAGNOSIS — Z80.0 FAMILY HX OF COLON CANCER REQUIRING SCREENING COLONOSCOPY: Primary | ICD-10-CM

## 2024-10-24 LAB
ALBUMIN SERPL-MCNC: 4.2 G/DL (ref 3.5–5.2)
ALP SERPL-CCNC: 53 U/L (ref 35–104)
ALT SERPL-CCNC: 12 U/L (ref 0–32)
ANION GAP SERPL CALCULATED.3IONS-SCNC: 11 MMOL/L (ref 7–16)
AST SERPL-CCNC: 15 U/L (ref 0–31)
BASOPHILS # BLD: 0.03 K/UL (ref 0–0.2)
BASOPHILS NFR BLD: 1 % (ref 0–2)
BILIRUB SERPL-MCNC: 0.7 MG/DL (ref 0–1.2)
BUN SERPL-MCNC: 21 MG/DL (ref 6–23)
CALCIUM SERPL-MCNC: 9.7 MG/DL (ref 8.6–10.2)
CHLORIDE SERPL-SCNC: 98 MMOL/L (ref 98–107)
CO2 SERPL-SCNC: 26 MMOL/L (ref 22–29)
CREAT SERPL-MCNC: 0.9 MG/DL (ref 0.5–1)
EOSINOPHIL # BLD: 0.2 K/UL (ref 0.05–0.5)
EOSINOPHILS RELATIVE PERCENT: 4 % (ref 0–6)
ERYTHROCYTE [DISTWIDTH] IN BLOOD BY AUTOMATED COUNT: 12.1 % (ref 11.5–15)
FERRITIN SERPL-MCNC: 84 NG/ML
GFR, ESTIMATED: 75 ML/MIN/1.73M2
GLUCOSE SERPL-MCNC: 131 MG/DL (ref 74–99)
HCT VFR BLD AUTO: 44.4 % (ref 34–48)
HGB BLD-MCNC: 15.2 G/DL (ref 11.5–15.5)
IGA SERPL-MCNC: 539 MG/DL (ref 70–400)
IGG SERPL-MCNC: 631 MG/DL (ref 700–1600)
IGM SERPL-MCNC: 136 MG/DL (ref 40–230)
IMM GRANULOCYTES # BLD AUTO: <0.03 K/UL (ref 0–0.58)
IMM GRANULOCYTES NFR BLD: 0 % (ref 0–5)
IRON SATN MFR SERPL: 35 % (ref 15–50)
IRON SERPL-MCNC: 120 UG/DL (ref 37–145)
LYMPHOCYTES NFR BLD: 1.04 K/UL (ref 1.5–4)
LYMPHOCYTES RELATIVE PERCENT: 22 % (ref 20–42)
MCH RBC QN AUTO: 29.3 PG (ref 26–35)
MCHC RBC AUTO-ENTMCNC: 34.2 G/DL (ref 32–34.5)
MCV RBC AUTO: 85.7 FL (ref 80–99.9)
MONOCYTES NFR BLD: 0.47 K/UL (ref 0.1–0.95)
MONOCYTES NFR BLD: 10 % (ref 2–12)
NEUTROPHILS NFR BLD: 64 % (ref 43–80)
NEUTS SEG NFR BLD: 3.04 K/UL (ref 1.8–7.3)
PLATELET # BLD AUTO: 239 K/UL (ref 130–450)
PMV BLD AUTO: 9 FL (ref 7–12)
POTASSIUM SERPL-SCNC: 4 MMOL/L (ref 3.5–5)
PROT SERPL-MCNC: 7 G/DL (ref 6.4–8.3)
RBC # BLD AUTO: 5.18 M/UL (ref 3.5–5.5)
SODIUM SERPL-SCNC: 135 MMOL/L (ref 132–146)
TIBC SERPL-MCNC: 343 UG/DL (ref 250–450)
WBC OTHER # BLD: 4.8 K/UL (ref 4.5–11.5)

## 2024-10-24 PROCEDURE — 83550 IRON BINDING TEST: CPT

## 2024-10-24 PROCEDURE — 82728 ASSAY OF FERRITIN: CPT

## 2024-10-24 PROCEDURE — 82232 ASSAY OF BETA-2 PROTEIN: CPT

## 2024-10-24 PROCEDURE — 36415 COLL VENOUS BLD VENIPUNCTURE: CPT

## 2024-10-24 PROCEDURE — 86334 IMMUNOFIX E-PHORESIS SERUM: CPT

## 2024-10-24 PROCEDURE — 84155 ASSAY OF PROTEIN SERUM: CPT

## 2024-10-24 PROCEDURE — 82784 ASSAY IGA/IGD/IGG/IGM EACH: CPT

## 2024-10-24 PROCEDURE — 84165 PROTEIN E-PHORESIS SERUM: CPT

## 2024-10-24 PROCEDURE — 83521 IG LIGHT CHAINS FREE EACH: CPT

## 2024-10-24 PROCEDURE — 85025 COMPLETE CBC W/AUTO DIFF WBC: CPT

## 2024-10-24 PROCEDURE — 80053 COMPREHEN METABOLIC PANEL: CPT

## 2024-10-24 PROCEDURE — 83540 ASSAY OF IRON: CPT

## 2024-10-25 LAB
ALBUMIN SERPL-MCNC: 3.5 G/DL (ref 3.5–4.7)
ALPHA1 GLOB SERPL ELPH-MCNC: 0.2 G/DL (ref 0.2–0.4)
ALPHA2 GLOB SERPL ELPH-MCNC: 0.9 G/DL (ref 0.5–1)
B-GLOBULIN SERPL ELPH-MCNC: 1.3 G/DL (ref 0.8–1.3)
GAMMA GLOB SERPL ELPH-MCNC: 0.9 G/DL (ref 0.7–1.6)
INTERPRETATION SERPL IFE-IMP: NORMAL
M PROTEIN SERPL ELPH-MCNC: 0.6 G/DL
PATH REV: NORMAL
PATHOLOGIST: NORMAL
PROT PATTERN SERPL ELPH-IMP: NORMAL
PROT SERPL-MCNC: 6.8 G/DL (ref 6.4–8.3)

## 2024-10-26 LAB
B2 MICROGLOB SERPL IA-MCNC: 2.1 MG/L
FREE KAPPA/LAMBDA RATIO: 0.79 (ref 0.22–1.74)
KAPPA LC FREE SER-MCNC: 16.3 MG/L
LAMBDA LC FREE SERPL-MCNC: 20.6 MG/L (ref 4.2–27.7)

## 2024-10-31 ENCOUNTER — OFFICE VISIT (OUTPATIENT)
Dept: ONCOLOGY | Age: 69
End: 2024-10-31
Payer: COMMERCIAL

## 2024-10-31 VITALS
WEIGHT: 276 LBS | HEART RATE: 68 BPM | OXYGEN SATURATION: 97 % | BODY MASS INDEX: 44.36 KG/M2 | TEMPERATURE: 97.8 F | SYSTOLIC BLOOD PRESSURE: 116 MMHG | HEIGHT: 66 IN | DIASTOLIC BLOOD PRESSURE: 70 MMHG

## 2024-10-31 DIAGNOSIS — E83.52 HYPERCALCEMIA: Primary | ICD-10-CM

## 2024-10-31 DIAGNOSIS — R71.8 MICROCYTOSIS: ICD-10-CM

## 2024-10-31 DIAGNOSIS — D47.2 MGUS (MONOCLONAL GAMMOPATHY OF UNKNOWN SIGNIFICANCE): ICD-10-CM

## 2024-10-31 PROCEDURE — 99212 OFFICE O/P EST SF 10 MIN: CPT

## 2024-10-31 PROCEDURE — 1123F ACP DISCUSS/DSCN MKR DOCD: CPT | Performed by: INTERNAL MEDICINE

## 2024-10-31 PROCEDURE — 99214 OFFICE O/P EST MOD 30 MIN: CPT | Performed by: INTERNAL MEDICINE

## 2024-10-31 NOTE — PROGRESS NOTES
[facility] MEDICAL ONCOLOGY  [address] [place] [address]  Dept: [phone]  Loc: [phone]  Attending progress note      Reason for Visit:   Leukopenia, history of PE, MGUS.    Referring Physician: Joan Vergara CNP    PCP:  Sanchez Nice DO    History of Present Illness:    The patient is a very pleasant 69-year-old lady,  with a PMH significant for hyperlipedemia, obesity, OA, I met the patient initially in 2016 after diagnosis with PE,she underwent on 1/29/2016 a left total hip arthroplasty, the patient was on Xarelto for about 2 weeks following her surgery. On 3/8/2016 the patient started having chest pain, shortness of breath, palpitations and lightheadedness, she was seen at the ED on 3/9/2916, had a CTA chest done revealing Extensive filling defects involve the distal central right and left pulmonary arteries, in addition to the bilateral upper and lower lobe and right middle lobe pulmonary arteries. Findings are compatible with pulmonary emboli. She had b/l LE venous ultrasounds done, were negative for DVTs.  The patient was on Xarelto for 1 year, it was discontinued on 3/22/2017.  No recurrent DVTs.  She continues to be on baby aspirin.    The patient has a history of intermittent leukopenia, her CBCD from 2/4/2021 was remarkable for a white count of 4.3, ANC 2130, hemoglobin 11.6, hematocrit 37.1, MCV 78.1, platelets 274K.     The patient was donating blood about once every 8 weeks.    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      The patient returns for a follow-up visit, doing well overall, she denies any bleeding, she has not been donating blood, no new bony pain.  She is working on weight loss.    Review of Systems;  CONSTITUTIONAL: No fever, chills. Good appetite, positive for weight loss.  ENMT: Eyes: No diplopia; Nose: No epistaxis.

## 2025-01-20 ENCOUNTER — HOSPITAL ENCOUNTER (OUTPATIENT)
Dept: INFUSION THERAPY | Age: 70
Discharge: HOME | End: 2025-01-20
Payer: MEDICARE

## 2025-01-20 DIAGNOSIS — D47.2 MGUS (MONOCLONAL GAMMOPATHY OF UNKNOWN SIGNIFICANCE): Primary | ICD-10-CM

## 2025-01-20 LAB
ALBUMIN SERPL-MCNC: 4.4 G/DL (ref 3.5–5.2)
ALP SERPL-CCNC: 55 U/L (ref 35–104)
ALT SERPL-CCNC: 15 U/L (ref 0–32)
ANION GAP SERPL CALCULATED.3IONS-SCNC: 11 MMOL/L (ref 7–16)
AST SERPL-CCNC: 18 U/L (ref 0–31)
BASOPHILS # BLD: 0.04 K/UL (ref 0–0.2)
BASOPHILS NFR BLD: 1 % (ref 0–2)
BILIRUB SERPL-MCNC: 0.9 MG/DL (ref 0–1.2)
BUN SERPL-MCNC: 24 MG/DL (ref 6–23)
CALCIUM SERPL-MCNC: 10.1 MG/DL (ref 8.6–10.2)
CHLORIDE SERPL-SCNC: 97 MMOL/L (ref 98–107)
CO2 SERPL-SCNC: 27 MMOL/L (ref 22–29)
CREAT SERPL-MCNC: 1 MG/DL (ref 0.5–1)
EOSINOPHIL # BLD: 0.15 K/UL (ref 0.05–0.5)
EOSINOPHILS RELATIVE PERCENT: 3 % (ref 0–6)
ERYTHROCYTE [DISTWIDTH] IN BLOOD BY AUTOMATED COUNT: 11.9 % (ref 11.5–15)
FERRITIN SERPL-MCNC: 117 NG/ML
GFR, ESTIMATED: 63 ML/MIN/1.73M2
GLUCOSE SERPL-MCNC: 123 MG/DL (ref 74–99)
HCT VFR BLD AUTO: 45.4 % (ref 34–48)
HGB BLD-MCNC: 15.5 G/DL (ref 11.5–15.5)
IGA SERPL-MCNC: 509 MG/DL (ref 70–400)
IGG SERPL-MCNC: 639 MG/DL (ref 700–1600)
IGM SERPL-MCNC: 114 MG/DL (ref 40–230)
IMM GRANULOCYTES # BLD AUTO: <0.03 K/UL (ref 0–0.58)
IMM GRANULOCYTES NFR BLD: 0 % (ref 0–5)
IRON SATN MFR SERPL: 41 % (ref 15–50)
IRON SERPL-MCNC: 138 UG/DL (ref 37–145)
LYMPHOCYTES NFR BLD: 1.4 K/UL (ref 1.5–4)
LYMPHOCYTES RELATIVE PERCENT: 27 % (ref 20–42)
MCH RBC QN AUTO: 29.5 PG (ref 26–35)
MCHC RBC AUTO-ENTMCNC: 34.1 G/DL (ref 32–34.5)
MCV RBC AUTO: 86.3 FL (ref 80–99.9)
MONOCYTES NFR BLD: 0.57 K/UL (ref 0.1–0.95)
MONOCYTES NFR BLD: 11 % (ref 2–12)
NEUTROPHILS NFR BLD: 59 % (ref 43–80)
NEUTS SEG NFR BLD: 3.11 K/UL (ref 1.8–7.3)
PLATELET # BLD AUTO: 247 K/UL (ref 130–450)
PMV BLD AUTO: 9.1 FL (ref 7–12)
POTASSIUM SERPL-SCNC: 4 MMOL/L (ref 3.5–5)
PROT SERPL-MCNC: 7.1 G/DL (ref 6.4–8.3)
RBC # BLD AUTO: 5.26 M/UL (ref 3.5–5.5)
SODIUM SERPL-SCNC: 135 MMOL/L (ref 132–146)
TIBC SERPL-MCNC: 333 UG/DL (ref 250–450)
WBC OTHER # BLD: 5.3 K/UL (ref 4.5–11.5)

## 2025-01-20 PROCEDURE — 84155 ASSAY OF PROTEIN SERUM: CPT

## 2025-01-20 PROCEDURE — 83521 IG LIGHT CHAINS FREE EACH: CPT

## 2025-01-20 PROCEDURE — 85025 COMPLETE CBC W/AUTO DIFF WBC: CPT

## 2025-01-20 PROCEDURE — 80053 COMPREHEN METABOLIC PANEL: CPT

## 2025-01-20 PROCEDURE — 83540 ASSAY OF IRON: CPT

## 2025-01-20 PROCEDURE — 82728 ASSAY OF FERRITIN: CPT

## 2025-01-20 PROCEDURE — 82232 ASSAY OF BETA-2 PROTEIN: CPT

## 2025-01-20 PROCEDURE — 84165 PROTEIN E-PHORESIS SERUM: CPT

## 2025-01-20 PROCEDURE — 36415 COLL VENOUS BLD VENIPUNCTURE: CPT

## 2025-01-20 PROCEDURE — 86334 IMMUNOFIX E-PHORESIS SERUM: CPT

## 2025-01-20 PROCEDURE — 82784 ASSAY IGA/IGD/IGG/IGM EACH: CPT

## 2025-01-20 PROCEDURE — 83550 IRON BINDING TEST: CPT

## 2025-01-22 LAB
ALBUMIN SERPL-MCNC: 3.7 G/DL (ref 3.5–4.7)
ALPHA1 GLOB SERPL ELPH-MCNC: 0.3 G/DL (ref 0.2–0.4)
ALPHA2 GLOB SERPL ELPH-MCNC: 1 G/DL (ref 0.5–1)
B-GLOBULIN SERPL ELPH-MCNC: 1.3 G/DL (ref 0.8–1.3)
B2 MICROGLOB SERPL IA-MCNC: 2.7 MG/L
FREE KAPPA/LAMBDA RATIO: 0.61 (ref 0.22–1.74)
GAMMA GLOB SERPL ELPH-MCNC: 0.8 G/DL (ref 0.7–1.6)
INTERPRETATION SERPL IFE-IMP: NORMAL
KAPPA LC FREE SER-MCNC: 13.8 MG/L
LAMBDA LC FREE SERPL-MCNC: 22.8 MG/L (ref 4.2–27.7)
M PROTEIN SERPL ELPH-MCNC: 0.6 G/DL
PATH REV: NORMAL
PATHOLOGIST: NORMAL
PROT PATTERN SERPL ELPH-IMP: NORMAL
PROT SERPL-MCNC: 6.9 G/DL (ref 6.4–8.3)

## 2025-01-27 ENCOUNTER — OFFICE VISIT (OUTPATIENT)
Dept: ONCOLOGY | Age: 70
End: 2025-01-27
Payer: MEDICARE

## 2025-01-27 VITALS
HEIGHT: 66 IN | TEMPERATURE: 97.3 F | SYSTOLIC BLOOD PRESSURE: 134 MMHG | BODY MASS INDEX: 43.55 KG/M2 | DIASTOLIC BLOOD PRESSURE: 87 MMHG | OXYGEN SATURATION: 96 % | HEART RATE: 74 BPM | WEIGHT: 271 LBS

## 2025-01-27 DIAGNOSIS — E83.52 HYPERCALCEMIA: Primary | ICD-10-CM

## 2025-01-27 DIAGNOSIS — D47.2 MGUS (MONOCLONAL GAMMOPATHY OF UNKNOWN SIGNIFICANCE): ICD-10-CM

## 2025-01-27 DIAGNOSIS — R71.8 MICROCYTOSIS: ICD-10-CM

## 2025-01-27 DIAGNOSIS — I26.99 OTHER ACUTE PULMONARY EMBOLISM, UNSPECIFIED WHETHER ACUTE COR PULMONALE PRESENT (HCC): ICD-10-CM

## 2025-01-27 PROCEDURE — G8427 DOCREV CUR MEDS BY ELIG CLIN: HCPCS | Performed by: INTERNAL MEDICINE

## 2025-01-27 PROCEDURE — 1123F ACP DISCUSS/DSCN MKR DOCD: CPT | Performed by: INTERNAL MEDICINE

## 2025-01-27 PROCEDURE — 1160F RVW MEDS BY RX/DR IN RCRD: CPT | Performed by: INTERNAL MEDICINE

## 2025-01-27 PROCEDURE — 1090F PRES/ABSN URINE INCON ASSESS: CPT | Performed by: INTERNAL MEDICINE

## 2025-01-27 PROCEDURE — 1036F TOBACCO NON-USER: CPT | Performed by: INTERNAL MEDICINE

## 2025-01-27 PROCEDURE — G8399 PT W/DXA RESULTS DOCUMENT: HCPCS | Performed by: INTERNAL MEDICINE

## 2025-01-27 PROCEDURE — 99212 OFFICE O/P EST SF 10 MIN: CPT

## 2025-01-27 PROCEDURE — 99214 OFFICE O/P EST MOD 30 MIN: CPT | Performed by: INTERNAL MEDICINE

## 2025-01-27 PROCEDURE — 1159F MED LIST DOCD IN RCRD: CPT | Performed by: INTERNAL MEDICINE

## 2025-01-27 PROCEDURE — 3017F COLORECTAL CA SCREEN DOC REV: CPT | Performed by: INTERNAL MEDICINE

## 2025-01-27 PROCEDURE — G8417 CALC BMI ABV UP PARAM F/U: HCPCS | Performed by: INTERNAL MEDICINE

## 2025-01-27 NOTE — PROGRESS NOTES
[facility] MEDICAL ONCOLOGY  [address] [place] [address]  Dept: [phone]  Loc: [phone]  Attending progress note      Reason for Visit:   Leukopenia, history of PE, MGUS.    Referring Physician: Joan Vergara CNP    PCP:  Sanchez Nice DO    History of Present Illness:    The patient is a very pleasant 69-year-old lady,  with a PMH significant for hyperlipedemia, obesity, OA, I met the patient initially in 2016 after diagnosis with PE,she underwent on 1/29/2016 a left total hip arthroplasty, the patient was on Xarelto for about 2 weeks following her surgery. On 3/8/2016 the patient started having chest pain, shortness of breath, palpitations and lightheadedness, she was seen at the ED on 3/9/2916, had a CTA chest done revealing Extensive filling defects involve the distal central right and left pulmonary arteries, in addition to the bilateral upper and lower lobe and right middle lobe pulmonary arteries. Findings are compatible with pulmonary emboli. She had b/l LE venous ultrasounds done, were negative for DVTs.  The patient was on Xarelto for 1 year, it was discontinued on 3/22/2017.  No recurrent DVTs.  She continues to be on baby aspirin.    The patient has a history of intermittent leukopenia, her CBCD from 2/4/2021 was remarkable for a white count of 4.3, ANC 2130, hemoglobin 11.6, hematocrit 37.1, MCV 78.1, platelets 274K.     The patient was donating blood about once every 8 weeks.    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      The patient returns for a follow-up visit, doing well overall, she denies any bleeding, she has not been donating blood, no new bony pain.  She is working part-time now.    Review of Systems;  CONSTITUTIONAL: No fever, chills. Good appetite, positive for weight loss.  ENMT: Eyes: No diplopia; Nose: No epistaxis.

## 2025-04-08 ENCOUNTER — HOSPITAL ENCOUNTER (OUTPATIENT)
Dept: MAMMOGRAPHY | Age: 70
Discharge: HOME | End: 2025-04-10
Payer: MEDICARE

## 2025-04-08 DIAGNOSIS — Z12.31 SCREENING MAMMOGRAM, ENCOUNTER FOR: ICD-10-CM

## 2025-04-08 PROCEDURE — 77067 SCR MAMMO BI INCL CAD: CPT

## 2025-04-21 ENCOUNTER — HOSPITAL ENCOUNTER (OUTPATIENT)
Dept: INFUSION THERAPY | Age: 70
Discharge: HOME | End: 2025-04-21
Payer: MEDICARE

## 2025-04-21 DIAGNOSIS — D47.2 MONOCLONAL GAMMOPATHY: Primary | ICD-10-CM

## 2025-04-21 LAB
ALBUMIN SERPL-MCNC: 4.2 G/DL (ref 3.5–5.2)
ALP SERPL-CCNC: 62 U/L (ref 35–104)
ALT SERPL-CCNC: 13 U/L (ref 0–32)
ANION GAP SERPL CALCULATED.3IONS-SCNC: 15 MMOL/L (ref 7–16)
AST SERPL-CCNC: 16 U/L (ref 0–31)
BASOPHILS # BLD: 0.03 K/UL (ref 0–0.2)
BASOPHILS NFR BLD: 1 % (ref 0–2)
BILIRUB SERPL-MCNC: 0.6 MG/DL (ref 0–1.2)
BUN SERPL-MCNC: 29 MG/DL (ref 6–23)
CALCIUM SERPL-MCNC: 9.8 MG/DL (ref 8.6–10.2)
CHLORIDE SERPL-SCNC: 101 MMOL/L (ref 98–107)
CO2 SERPL-SCNC: 23 MMOL/L (ref 22–29)
CREAT SERPL-MCNC: 0.9 MG/DL (ref 0.5–1)
EOSINOPHIL # BLD: 0.21 K/UL (ref 0.05–0.5)
EOSINOPHILS RELATIVE PERCENT: 5 % (ref 0–6)
ERYTHROCYTE [DISTWIDTH] IN BLOOD BY AUTOMATED COUNT: 12.5 % (ref 11.5–15)
FERRITIN SERPL-MCNC: 98 NG/ML
FREE KAPPA/LAMBDA RATIO: 1.03 (ref 0.22–1.74)
GFR, ESTIMATED: 67 ML/MIN/1.73M2
GLUCOSE SERPL-MCNC: 130 MG/DL (ref 74–99)
HCT VFR BLD AUTO: 44.2 % (ref 34–48)
HGB BLD-MCNC: 14.5 G/DL (ref 11.5–15.5)
IGA SERPL-MCNC: 516 MG/DL (ref 70–400)
IGG SERPL-MCNC: 632 MG/DL (ref 700–1600)
IGM SERPL-MCNC: 111 MG/DL (ref 40–230)
IMM GRANULOCYTES # BLD AUTO: <0.03 K/UL (ref 0–0.58)
IMM GRANULOCYTES NFR BLD: 0 % (ref 0–5)
IRON SATN MFR SERPL: 32 % (ref 15–50)
IRON SERPL-MCNC: 102 UG/DL (ref 37–145)
KAPPA LC FREE SER-MCNC: 14.1 MG/L
LAMBDA LC FREE SERPL-MCNC: 13.7 MG/L (ref 4.2–27.7)
LYMPHOCYTES NFR BLD: 1.35 K/UL (ref 1.5–4)
LYMPHOCYTES RELATIVE PERCENT: 29 % (ref 20–42)
MCH RBC QN AUTO: 29 PG (ref 26–35)
MCHC RBC AUTO-ENTMCNC: 32.8 G/DL (ref 32–34.5)
MCV RBC AUTO: 88.4 FL (ref 80–99.9)
MONOCYTES NFR BLD: 0.44 K/UL (ref 0.1–0.95)
MONOCYTES NFR BLD: 10 % (ref 2–12)
NEUTROPHILS NFR BLD: 56 % (ref 43–80)
NEUTS SEG NFR BLD: 2.55 K/UL (ref 1.8–7.3)
PLATELET # BLD AUTO: 223 K/UL (ref 130–450)
PMV BLD AUTO: 9 FL (ref 7–12)
POTASSIUM SERPL-SCNC: 4 MMOL/L (ref 3.5–5)
PROT SERPL-MCNC: 6.8 G/DL (ref 6.4–8.3)
RBC # BLD AUTO: 5 M/UL (ref 3.5–5.5)
SODIUM SERPL-SCNC: 139 MMOL/L (ref 132–146)
TIBC SERPL-MCNC: 320 UG/DL (ref 250–450)
WBC OTHER # BLD: 4.6 K/UL (ref 4.5–11.5)

## 2025-04-21 PROCEDURE — 82232 ASSAY OF BETA-2 PROTEIN: CPT

## 2025-04-21 PROCEDURE — 82728 ASSAY OF FERRITIN: CPT

## 2025-04-21 PROCEDURE — 83550 IRON BINDING TEST: CPT

## 2025-04-21 PROCEDURE — 84165 PROTEIN E-PHORESIS SERUM: CPT

## 2025-04-21 PROCEDURE — 80053 COMPREHEN METABOLIC PANEL: CPT

## 2025-04-21 PROCEDURE — 83521 IG LIGHT CHAINS FREE EACH: CPT

## 2025-04-21 PROCEDURE — 84155 ASSAY OF PROTEIN SERUM: CPT

## 2025-04-21 PROCEDURE — 36415 COLL VENOUS BLD VENIPUNCTURE: CPT

## 2025-04-21 PROCEDURE — 86334 IMMUNOFIX E-PHORESIS SERUM: CPT

## 2025-04-21 PROCEDURE — 85025 COMPLETE CBC W/AUTO DIFF WBC: CPT

## 2025-04-21 PROCEDURE — 83540 ASSAY OF IRON: CPT

## 2025-04-21 PROCEDURE — 82784 ASSAY IGA/IGD/IGG/IGM EACH: CPT

## 2025-04-23 LAB
ALBUMIN SERPL-MCNC: 3.5 G/DL (ref 3.5–4.7)
ALPHA1 GLOB SERPL ELPH-MCNC: 0.3 G/DL (ref 0.2–0.4)
ALPHA2 GLOB SERPL ELPH-MCNC: 0.9 G/DL (ref 0.5–1)
B-GLOBULIN SERPL ELPH-MCNC: 1.2 G/DL (ref 0.8–1.3)
B2 MICROGLOB SERPL IA-MCNC: 2.5 MG/L
GAMMA GLOB SERPL ELPH-MCNC: 0.7 G/DL (ref 0.7–1.6)
INTERPRETATION SERPL IFE-IMP: NORMAL
M PROTEIN SERPL ELPH-MCNC: 0.3 G/DL
PATH REV: NORMAL
PATHOLOGIST: NORMAL
PROT PATTERN SERPL ELPH-IMP: NORMAL
PROT SERPL-MCNC: 6.6 G/DL (ref 6.4–8.3)

## 2025-04-28 ENCOUNTER — HOSPITAL ENCOUNTER (OUTPATIENT)
Age: 70
Discharge: HOME | End: 2025-04-28
Payer: MEDICARE

## 2025-04-28 ENCOUNTER — OFFICE VISIT (OUTPATIENT)
Dept: ONCOLOGY | Age: 70
End: 2025-04-28
Payer: MEDICARE

## 2025-04-28 VITALS
WEIGHT: 271 LBS | BODY MASS INDEX: 43.74 KG/M2 | SYSTOLIC BLOOD PRESSURE: 143 MMHG | DIASTOLIC BLOOD PRESSURE: 86 MMHG | TEMPERATURE: 97.6 F | OXYGEN SATURATION: 97 % | HEART RATE: 69 BPM

## 2025-04-28 DIAGNOSIS — D47.2 MGUS (MONOCLONAL GAMMOPATHY OF UNKNOWN SIGNIFICANCE): ICD-10-CM

## 2025-04-28 DIAGNOSIS — I26.99 OTHER ACUTE PULMONARY EMBOLISM, UNSPECIFIED WHETHER ACUTE COR PULMONALE PRESENT (HCC): ICD-10-CM

## 2025-04-28 DIAGNOSIS — E83.52 HYPERCALCEMIA: Primary | ICD-10-CM

## 2025-04-28 LAB
ALBUMIN SERPL-MCNC: 4 G/DL (ref 3.5–5.2)
ALP SERPL-CCNC: 63 U/L (ref 35–104)
ALT SERPL-CCNC: 11 U/L (ref 0–32)
ANION GAP SERPL CALCULATED.3IONS-SCNC: 11 MMOL/L (ref 7–16)
AST SERPL-CCNC: 15 U/L (ref 0–31)
BASOPHILS # BLD: 0.03 K/UL (ref 0–0.2)
BASOPHILS NFR BLD: 1 % (ref 0–2)
BILIRUB SERPL-MCNC: 0.4 MG/DL (ref 0–1.2)
BUN SERPL-MCNC: 23 MG/DL (ref 6–23)
CALCIUM SERPL-MCNC: 9.6 MG/DL (ref 8.6–10.2)
CHLORIDE SERPL-SCNC: 101 MMOL/L (ref 98–107)
CHOLEST SERPL-MCNC: 152 MG/DL
CO2 SERPL-SCNC: 25 MMOL/L (ref 22–29)
CREAT SERPL-MCNC: 0.9 MG/DL (ref 0.5–1)
EOSINOPHIL # BLD: 0.18 K/UL (ref 0.05–0.5)
EOSINOPHILS RELATIVE PERCENT: 4 % (ref 0–6)
ERYTHROCYTE [DISTWIDTH] IN BLOOD BY AUTOMATED COUNT: 12.2 % (ref 11.5–15)
GFR, ESTIMATED: 69 ML/MIN/1.73M2
GLUCOSE P FAST SERPL-MCNC: 109 MG/DL (ref 74–99)
HCT VFR BLD AUTO: 41.6 % (ref 34–48)
HDLC SERPL-MCNC: 51 MG/DL
HGB BLD-MCNC: 13.9 G/DL (ref 11.5–15.5)
IMM GRANULOCYTES # BLD AUTO: <0.03 K/UL (ref 0–0.58)
IMM GRANULOCYTES NFR BLD: 0 % (ref 0–5)
LDLC SERPL CALC-MCNC: 70 MG/DL
LYMPHOCYTES NFR BLD: 1.14 K/UL (ref 1.5–4)
LYMPHOCYTES RELATIVE PERCENT: 25 % (ref 20–42)
MCH RBC QN AUTO: 28.8 PG (ref 26–35)
MCHC RBC AUTO-ENTMCNC: 33.4 G/DL (ref 32–34.5)
MCV RBC AUTO: 86.3 FL (ref 80–99.9)
MONOCYTES NFR BLD: 0.37 K/UL (ref 0.1–0.95)
MONOCYTES NFR BLD: 8 % (ref 2–12)
NEUTROPHILS NFR BLD: 62 % (ref 43–80)
NEUTS SEG NFR BLD: 2.8 K/UL (ref 1.8–7.3)
PLATELET # BLD AUTO: 223 K/UL (ref 130–450)
PMV BLD AUTO: 9.6 FL (ref 7–12)
POTASSIUM SERPL-SCNC: 4.4 MMOL/L (ref 3.5–5)
PROT SERPL-MCNC: 6.5 G/DL (ref 6.4–8.3)
RBC # BLD AUTO: 4.82 M/UL (ref 3.5–5.5)
SODIUM SERPL-SCNC: 137 MMOL/L (ref 132–146)
T4 FREE SERPL-MCNC: 1.2 NG/DL (ref 0.9–1.7)
TRIGL SERPL-MCNC: 154 MG/DL
TSH SERPL DL<=0.05 MIU/L-ACNC: 1.26 UIU/ML (ref 0.27–4.2)
VLDLC SERPL CALC-MCNC: 31 MG/DL
WBC OTHER # BLD: 4.5 K/UL (ref 4.5–11.5)

## 2025-04-28 PROCEDURE — 1126F AMNT PAIN NOTED NONE PRSNT: CPT | Performed by: INTERNAL MEDICINE

## 2025-04-28 PROCEDURE — G8427 DOCREV CUR MEDS BY ELIG CLIN: HCPCS | Performed by: INTERNAL MEDICINE

## 2025-04-28 PROCEDURE — 87389 HIV-1 AG W/HIV-1&-2 AB AG IA: CPT

## 2025-04-28 PROCEDURE — 84443 ASSAY THYROID STIM HORMONE: CPT

## 2025-04-28 PROCEDURE — 1090F PRES/ABSN URINE INCON ASSESS: CPT | Performed by: INTERNAL MEDICINE

## 2025-04-28 PROCEDURE — G8399 PT W/DXA RESULTS DOCUMENT: HCPCS | Performed by: INTERNAL MEDICINE

## 2025-04-28 PROCEDURE — 99212 OFFICE O/P EST SF 10 MIN: CPT

## 2025-04-28 PROCEDURE — 80053 COMPREHEN METABOLIC PANEL: CPT

## 2025-04-28 PROCEDURE — 3017F COLORECTAL CA SCREEN DOC REV: CPT | Performed by: INTERNAL MEDICINE

## 2025-04-28 PROCEDURE — 85025 COMPLETE CBC W/AUTO DIFF WBC: CPT

## 2025-04-28 PROCEDURE — 1160F RVW MEDS BY RX/DR IN RCRD: CPT | Performed by: INTERNAL MEDICINE

## 2025-04-28 PROCEDURE — 36415 COLL VENOUS BLD VENIPUNCTURE: CPT

## 2025-04-28 PROCEDURE — 80061 LIPID PANEL: CPT

## 2025-04-28 PROCEDURE — 99214 OFFICE O/P EST MOD 30 MIN: CPT | Performed by: INTERNAL MEDICINE

## 2025-04-28 PROCEDURE — G8417 CALC BMI ABV UP PARAM F/U: HCPCS | Performed by: INTERNAL MEDICINE

## 2025-04-28 PROCEDURE — 1159F MED LIST DOCD IN RCRD: CPT | Performed by: INTERNAL MEDICINE

## 2025-04-28 PROCEDURE — 1123F ACP DISCUSS/DSCN MKR DOCD: CPT | Performed by: INTERNAL MEDICINE

## 2025-04-28 PROCEDURE — 1036F TOBACCO NON-USER: CPT | Performed by: INTERNAL MEDICINE

## 2025-04-28 PROCEDURE — 86803 HEPATITIS C AB TEST: CPT

## 2025-04-28 PROCEDURE — 84439 ASSAY OF FREE THYROXINE: CPT

## 2025-04-28 NOTE — PROGRESS NOTES
[facility] MEDICAL ONCOLOGY  [address] [place] [address]  Dept: [phone]  Loc: [phone]  Attending progress note      Reason for Visit:   Leukopenia, history of PE, MGUS.    Referring Physician: Joan Vergara CNP    PCP:  Sanchez Nice DO    History of Present Illness:    The patient is a very pleasant 69-year-old lady,  with a PMH significant for hyperlipedemia, obesity, OA, I met the patient initially in 2016 after diagnosis with PE,she underwent on 1/29/2016 a left total hip arthroplasty, the patient was on Xarelto for about 2 weeks following her surgery. On 3/8/2016 the patient started having chest pain, shortness of breath, palpitations and lightheadedness, she was seen at the ED on 3/9/2916, had a CTA chest done revealing Extensive filling defects involve the distal central right and left pulmonary arteries, in addition to the bilateral upper and lower lobe and right middle lobe pulmonary arteries. Findings are compatible with pulmonary emboli. She had b/l LE venous ultrasounds done, were negative for DVTs.  The patient was on Xarelto for 1 year, it was discontinued on 3/22/2017.  No recurrent DVTs.  She continues to be on baby aspirin.    The patient has a history of intermittent leukopenia, her CBCD from 2/4/2021 was remarkable for a white count of 4.3, ANC 2130, hemoglobin 11.6, hematocrit 37.1, MCV 78.1, platelets 274K.     The patient was donating blood about once every 8 weeks.    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      The patient returns for a follow-up visit, doing well overall, she denies any bleeding, no blood donation since her last visit, no new bony pain.  She is working part-time now.    Review of Systems;  CONSTITUTIONAL: No fever, chills. Good appetite, positive for weight loss.  ENMT: Eyes: No diplopia; Nose: No

## 2025-04-29 LAB
HCV AB SERPL QL IA: NONREACTIVE
HIV 1+2 AB+HIV1 P24 AG SERPL QL IA: NONREACTIVE